# Patient Record
Sex: FEMALE | Race: WHITE | HISPANIC OR LATINO | URBAN - METROPOLITAN AREA
[De-identification: names, ages, dates, MRNs, and addresses within clinical notes are randomized per-mention and may not be internally consistent; named-entity substitution may affect disease eponyms.]

---

## 2023-04-12 ENCOUNTER — NURSING HOME VISIT (OUTPATIENT)
Dept: PRIMARY CARE | Facility: CLINIC | Age: 88
End: 2023-04-12
Payer: MEDICARE

## 2023-04-12 DIAGNOSIS — J18.9 PNEUMONIA DUE TO INFECTIOUS ORGANISM, UNSPECIFIED LATERALITY, UNSPECIFIED PART OF LUNG: ICD-10-CM

## 2023-04-12 DIAGNOSIS — E78.49 OTHER HYPERLIPIDEMIA: ICD-10-CM

## 2023-04-12 DIAGNOSIS — I48.20 CHRONIC ATRIAL FIBRILLATION (MULTI): Primary | ICD-10-CM

## 2023-04-12 DIAGNOSIS — I10 PRIMARY HYPERTENSION: ICD-10-CM

## 2023-04-12 PROBLEM — I50.9 CONGESTIVE HEART FAILURE (MULTI): Status: ACTIVE | Noted: 2023-04-12

## 2023-04-12 PROBLEM — H90.3 BILATERAL SENSORINEURAL HEARING LOSS: Status: ACTIVE | Noted: 2023-04-12

## 2023-04-12 PROBLEM — M15.9 GENERALIZED OSTEOARTHRITIS OF HAND: Status: ACTIVE | Noted: 2023-04-12

## 2023-04-12 PROBLEM — I48.91 ATRIAL FIBRILLATION (MULTI): Status: ACTIVE | Noted: 2023-04-12

## 2023-04-12 PROBLEM — M15.9 GENERALIZED OSTEOARTHRITIS OF MULTIPLE SITES: Status: ACTIVE | Noted: 2023-04-12

## 2023-04-12 PROCEDURE — 99306 1ST NF CARE HIGH MDM 50: CPT | Performed by: INTERNAL MEDICINE

## 2023-04-13 NOTE — PROGRESS NOTES
Subjective- LTC admission to [facility] Transferred from [facility]. Mrs. Patiño has experienced significant cognitive decline and associated increased personal care needs following a stroke in 11/2022. She was unable to continue living independently at [facility] due to pregressive decline in physical capability.  H/O HTN, CKD stage 3b, HFpEF, Mixed dementia with behavioral disturbance, Late onset Alzheimer's diseaseand vascular dementia CAD,afib.  ROS-  Gen- no wt change  Chest-no pain  Respiratory- no cough or shortness of breath+  Abdomen-no constipation No nausea vomiting or diarrhea  Muskuloskeletal-no pain  Psych-no confusion  Vital signs-/69 T 96.9 P 68 Wt 123.6  General-alert, no acute distress  Neck-supple, non tender  Chest-clear breath sounds bilaterally  Cardiovascular-S1S2 regular  Abdomen-bowel sounds present, soft, nontender  Extremities-no edema  Psych-normal mood to affect, no confusion    Assessment and plan-  Cough, low pulse ox- sx started yesterday  ordered stat cxr- pending  c/w albuterol nebulizer q 8 hrs prn  HTN- stable  c/w lisinopril  A fib- c/w metoprolol, apixaban  HLD- c/w lipitor  OA related pains- c/w tylenol prn  Weakness- c/w supportive care and fall precautions

## 2023-04-19 ENCOUNTER — NURSING HOME VISIT (OUTPATIENT)
Dept: POST ACUTE CARE | Facility: EXTERNAL LOCATION | Age: 88
End: 2023-04-19
Payer: MEDICARE

## 2023-04-19 DIAGNOSIS — E78.49 OTHER HYPERLIPIDEMIA: ICD-10-CM

## 2023-04-19 DIAGNOSIS — I48.20 CHRONIC ATRIAL FIBRILLATION (MULTI): Primary | ICD-10-CM

## 2023-04-19 DIAGNOSIS — I50.9 CONGESTIVE HEART FAILURE, UNSPECIFIED HF CHRONICITY, UNSPECIFIED HEART FAILURE TYPE (MULTI): ICD-10-CM

## 2023-04-19 DIAGNOSIS — I10 PRIMARY HYPERTENSION: ICD-10-CM

## 2023-04-19 PROCEDURE — 99308 SBSQ NF CARE LOW MDM 20: CPT | Performed by: INTERNAL MEDICINE

## 2023-04-19 NOTE — LETTER
Patient: Susan Patiño  : 3/29/1926    Encounter Date: 2023    Subjective- resident lying in bed. Appears weak.  No fever.  ROS-  Gen- no wt change  Chest-no pain  Respiratory- no cough or shortness of breath+  Abdomen-no constipation No nausea vomiting or diarrhea  Muskuloskeletal-no pain  Psych-no confusion  Vital signs-/76 T 98.1 P 68 Wt 123.6  General-alert, no acute distress  Neck-supple, non tender  Chest-clear breath sounds bilaterally  Cardiovascular-S1S2 regular  Abdomen-bowel sounds present, soft, nontender  Extremities-no edema  Psych-normal mood to affect, no confusion  Assessment and plan-  Cough, possible left sided pneumonia- s/p azithromycin  c/w albuterol nebulizer q 8 hrs prn  HTN- stable  c/w lisinopril  A fib- c/w metoprolol, apixaban  HLD- c/w lipitor  OA related pains- c/w tylenol prn  Weakness- c/w supportive care and fall precautions      Electronically Signed By: Rajni Acosta MD   23 10:41 AM

## 2023-04-21 NOTE — PROGRESS NOTES
Subjective- resident lying in bed. Appears weak.  No fever.  ROS-  Gen- no wt change  Chest-no pain  Respiratory- no cough or shortness of breath+  Abdomen-no constipation No nausea vomiting or diarrhea  Muskuloskeletal-no pain  Psych-no confusion  Vital signs-/76 T 98.1 P 68 Wt 123.6  General-alert, no acute distress  Neck-supple, non tender  Chest-clear breath sounds bilaterally  Cardiovascular-S1S2 regular  Abdomen-bowel sounds present, soft, nontender  Extremities-no edema  Psych-normal mood to affect, no confusion  Assessment and plan-  Cough, possible left sided pneumonia- s/p azithromycin  c/w albuterol nebulizer q 8 hrs prn  HTN- stable  c/w lisinopril  A fib- c/w metoprolol, apixaban  HLD- c/w lipitor  OA related pains- c/w tylenol prn  Weakness- c/w supportive care and fall precautions

## 2023-04-26 ENCOUNTER — NURSING HOME VISIT (OUTPATIENT)
Dept: POST ACUTE CARE | Facility: EXTERNAL LOCATION | Age: 88
End: 2023-04-26
Payer: MEDICARE

## 2023-04-26 DIAGNOSIS — I10 PRIMARY HYPERTENSION: ICD-10-CM

## 2023-04-26 DIAGNOSIS — I48.20 CHRONIC ATRIAL FIBRILLATION (MULTI): Primary | ICD-10-CM

## 2023-04-26 DIAGNOSIS — E78.49 OTHER HYPERLIPIDEMIA: ICD-10-CM

## 2023-04-26 PROCEDURE — 99308 SBSQ NF CARE LOW MDM 20: CPT | Performed by: INTERNAL MEDICINE

## 2023-04-26 NOTE — LETTER
Patient: Susan Patiño  : 3/29/1926    Encounter Date: 2023    Subjective- resident sitting in bed. Appears well.  No fever. No cough or SOB.  ROS-  Gen- no wt change  Chest-no pain  Respiratory- no cough or shortness of breath  Abdomen-no constipation No nausea vomiting or diarrhea  Muskuloskeletal-no pain  Psych-no confusion  Vital signs-/68 T 98.2 P 66 Wt 121.6  General-alert, no acute distress  Neck-supple, non tender  Chest-clear breath sounds bilaterally  Cardiovascular-S1S2 regular  Abdomen-bowel sounds present, soft, nontender  Extremities-no edema  Psych-normal mood to affect, no confusion    Assessment and plan-  Recentleft sided pneumonia- s/p azithromycin- sx improved  c/w albuterol prn  HTN- stable  c/w lisinopril  A fib- c/w metoprolol, apixaban  HLD- c/w lipitor  OA related pains- c/w tylenol prn  Weakness- c/w supportive care and fall precautions      Electronically Signed By: Rajni Acosta MD   23  8:04 PM

## 2023-04-27 NOTE — PROGRESS NOTES
Subjective- resident sitting in bed. Appears well.  No fever. No cough or SOB.  ROS-  Gen- no wt change  Chest-no pain  Respiratory- no cough or shortness of breath  Abdomen-no constipation No nausea vomiting or diarrhea  Muskuloskeletal-no pain  Psych-no confusion  Vital signs-/68 T 98.2 P 66 Wt 121.6  General-alert, no acute distress  Neck-supple, non tender  Chest-clear breath sounds bilaterally  Cardiovascular-S1S2 regular  Abdomen-bowel sounds present, soft, nontender  Extremities-no edema  Psych-normal mood to affect, no confusion    Assessment and plan-  Recentleft sided pneumonia- s/p azithromycin- sx improved  c/w albuterol prn  HTN- stable  c/w lisinopril  A fib- c/w metoprolol, apixaban  HLD- c/w lipitor  OA related pains- c/w tylenol prn  Weakness- c/w supportive care and fall precautions

## 2023-05-10 ENCOUNTER — NURSING HOME VISIT (OUTPATIENT)
Dept: POST ACUTE CARE | Facility: EXTERNAL LOCATION | Age: 88
End: 2023-05-10
Payer: MEDICARE

## 2023-05-10 DIAGNOSIS — N18.9 CHRONIC KIDNEY DISEASE, UNSPECIFIED CKD STAGE: ICD-10-CM

## 2023-05-10 DIAGNOSIS — I48.20 CHRONIC ATRIAL FIBRILLATION (MULTI): ICD-10-CM

## 2023-05-10 DIAGNOSIS — M15.9 GENERALIZED OSTEOARTHRITIS OF MULTIPLE SITES: ICD-10-CM

## 2023-05-10 DIAGNOSIS — I10 PRIMARY HYPERTENSION: Primary | ICD-10-CM

## 2023-05-10 PROCEDURE — 99307 SBSQ NF CARE SF MDM 10: CPT | Performed by: INTERNAL MEDICINE

## 2023-05-10 NOTE — LETTER
Patient: Susan Patiño  : 3/29/1926    Encounter Date: 05/10/2023    Subjective- resident sitting in dining room. Appears pleasant and well.  No fever. No cough or SOB.  ROS-  Gen- no wt change  Chest-no pain  Respiratory- no cough or shortness of breath  Abdomen-no constipation No nausea vomiting or diarrhea  Muskuloskeletal-no pain  Psych-no confusion  Vital signs-/68 T 97.5 P 66 Wt 122  General-alert, no acute distress  Neck-supple, non tender  Chest-clear breath sounds bilaterally  Cardiovascular-S1S2 regular  Abdomen-bowel sounds present, soft, nontender  Extremities-no edema  Psych-normal mood to affect, no confusion    Assessment and plan-  HTN- stable  c/w lisinopril  A fib- c/w metoprolol, apixaban  HLD- c/w lipitor  OA related pains- c/w tylenol prn  CKD- check labs  CHF- stable      Electronically Signed By: Rajni Acosta MD   23  9:24 AM

## 2023-05-12 ENCOUNTER — NURSING HOME VISIT (OUTPATIENT)
Dept: POST ACUTE CARE | Facility: EXTERNAL LOCATION | Age: 88
End: 2023-05-12
Payer: MEDICARE

## 2023-05-12 DIAGNOSIS — I10 PRIMARY HYPERTENSION: Primary | ICD-10-CM

## 2023-05-12 DIAGNOSIS — N18.9 CHRONIC KIDNEY DISEASE, UNSPECIFIED CKD STAGE: ICD-10-CM

## 2023-05-12 DIAGNOSIS — I48.20 CHRONIC ATRIAL FIBRILLATION (MULTI): ICD-10-CM

## 2023-05-12 PROCEDURE — 99307 SBSQ NF CARE SF MDM 10: CPT | Performed by: INTERNAL MEDICINE

## 2023-05-12 NOTE — PROGRESS NOTES
Subjective- resident sitting in dining room. Appears pleasant and well.  No fever. No cough or SOB.  ROS-  Gen- no wt change  Chest-no pain  Respiratory- no cough or shortness of breath  Abdomen-no constipation No nausea vomiting or diarrhea  Muskuloskeletal-no pain  Psych-no confusion  Vital signs-/68 T 97.5 P 66 Wt 122  General-alert, no acute distress  Neck-supple, non tender  Chest-clear breath sounds bilaterally  Cardiovascular-S1S2 regular  Abdomen-bowel sounds present, soft, nontender  Extremities-no edema  Psych-normal mood to affect, no confusion    Assessment and plan-  HTN- stable  c/w lisinopril  A fib- c/w metoprolol, apixaban  HLD- c/w lipitor  OA related pains- c/w tylenol prn  CKD- check labs  CHF- stable

## 2023-05-12 NOTE — LETTER
Patient: Susan Patiño  : 3/29/1926    Encounter Date: 2023    No notes on file    Electronically Signed By: Rajni Acosta MD   23  9:22 AM

## 2023-05-24 ENCOUNTER — NURSING HOME VISIT (OUTPATIENT)
Dept: POST ACUTE CARE | Facility: EXTERNAL LOCATION | Age: 88
End: 2023-05-24
Payer: MEDICARE

## 2023-05-24 DIAGNOSIS — I10 PRIMARY HYPERTENSION: Primary | ICD-10-CM

## 2023-05-24 DIAGNOSIS — J44.9 COPD MIXED TYPE (MULTI): ICD-10-CM

## 2023-05-24 DIAGNOSIS — I48.20 CHRONIC ATRIAL FIBRILLATION (MULTI): ICD-10-CM

## 2023-05-24 DIAGNOSIS — R53.1 WEAKNESS: ICD-10-CM

## 2023-05-24 PROCEDURE — 99308 SBSQ NF CARE LOW MDM 20: CPT | Performed by: INTERNAL MEDICINE

## 2023-05-24 NOTE — LETTER
Patient: Susan Patiño  : 3/29/1926    Encounter Date: 2023    OSTEOARTHRITIS, UNSPECIFIED SITE, [has been stable / has had the following changes] this last quarter:  Subjective- resident sitting in her room. Appears pleasant and well.  No fever. No cough or SOB.  ROS-  Gen- no wt change  Chest-no pain  Respiratory- no cough or shortness of breath  Abdomen-no constipation No nausea vomiting or diarrhea  Muskuloskeletal-no pain  Psych-no confusion  Vital signs-/76 T 97.7 P 77 Wt 124  General-alert, no acute distress  Neck-supple, non tender  Chest-clear breath sounds bilaterally  Cardiovascular-S1S2 regular  Abdomen-bowel sounds present, soft, nontender  Extremities-no edema  Psych-normal mood to affect, no confusion  Assessment and plan-  HTN- stable  c/w lisinopril  A fib- c/w metoprolol, apixaban  HLD- c/w lipitor  OA related pains- c/w tylenol prn  CKD- monitor  CHF- stable  Weakness- c/w supportive care and fall precautions      Electronically Signed By: Rajni Acosta MD   23  8:44 PM

## 2023-05-25 NOTE — PROGRESS NOTES
OSTEOARTHRITIS, UNSPECIFIED SITE, [has been stable / has had the following changes] this last quarter:  Subjective- resident sitting in her room. Appears pleasant and well.  No fever. No cough or SOB.  ROS-  Gen- no wt change  Chest-no pain  Respiratory- no cough or shortness of breath  Abdomen-no constipation No nausea vomiting or diarrhea  Muskuloskeletal-no pain  Psych-no confusion  Vital signs-/76 T 97.7 P 77 Wt 124  General-alert, no acute distress  Neck-supple, non tender  Chest-clear breath sounds bilaterally  Cardiovascular-S1S2 regular  Abdomen-bowel sounds present, soft, nontender  Extremities-no edema  Psych-normal mood to affect, no confusion  Assessment and plan-  HTN- stable  c/w lisinopril  A fib- c/w metoprolol, apixaban  HLD- c/w lipitor  OA related pains- c/w tylenol prn  CKD- monitor  CHF- stable  Weakness- c/w supportive care and fall precautions

## 2023-06-07 ENCOUNTER — NURSING HOME VISIT (OUTPATIENT)
Dept: POST ACUTE CARE | Facility: EXTERNAL LOCATION | Age: 88
End: 2023-06-07
Payer: MEDICARE

## 2023-06-07 DIAGNOSIS — I48.20 CHRONIC ATRIAL FIBRILLATION (MULTI): ICD-10-CM

## 2023-06-07 DIAGNOSIS — I10 PRIMARY HYPERTENSION: Primary | ICD-10-CM

## 2023-06-07 DIAGNOSIS — E78.49 OTHER HYPERLIPIDEMIA: ICD-10-CM

## 2023-06-07 DIAGNOSIS — M15.9 GENERALIZED OSTEOARTHRITIS OF HAND: ICD-10-CM

## 2023-06-07 PROCEDURE — 99308 SBSQ NF CARE LOW MDM 20: CPT | Performed by: INTERNAL MEDICINE

## 2023-06-07 NOTE — LETTER
Patient: Susan Patiño  : 3/29/1926    Encounter Date: 2023    Subjective- resident sitting in dining room. Appears pleasant and well.  No fever. No cough or SOB.  ROS-  Gen- no wt change  Chest-no pain  Respiratory- no cough or shortness of breath  Abdomen-no constipation No nausea vomiting or diarrhea  Muskuloskeletal-no pain  Psych-no confusion  Vital signs-/69 T 98 P 71 Wt 124  General-alert, no acute distress  Neck-supple, non tender  Chest-clear breath sounds bilaterally  Cardiovascular-S1S2 regular  Abdomen-bowel sounds present, soft, nontender  Extremities-no edema  Psych-normal mood to affect, no confusion  Assessment and plan-  HTN- stable  c/w lisinopril  A fib- c/w metoprolol, apixaban  HLD- c/w lipitor  OA- c/w tylenol prn  CKD- improved  CHF- stable  Weakness- c/w supportive care and fall precautions      Electronically Signed By: Rajin Acosta MD   23 11:12 AM

## 2023-06-08 NOTE — PROGRESS NOTES
Subjective- resident sitting in dining room. Appears pleasant and well.  No fever. No cough or SOB.  ROS-  Gen- no wt change  Chest-no pain  Respiratory- no cough or shortness of breath  Abdomen-no constipation No nausea vomiting or diarrhea  Muskuloskeletal-no pain  Psych-no confusion  Vital signs-/69 T 98 P 71 Wt 124  General-alert, no acute distress  Neck-supple, non tender  Chest-clear breath sounds bilaterally  Cardiovascular-S1S2 regular  Abdomen-bowel sounds present, soft, nontender  Extremities-no edema  Psych-normal mood to affect, no confusion  Assessment and plan-  HTN- stable  c/w lisinopril  A fib- c/w metoprolol, apixaban  HLD- c/w lipitor  OA- c/w tylenol prn  CKD- improved  CHF- stable  Weakness- c/w supportive care and fall precautions

## 2023-06-14 ENCOUNTER — NURSING HOME VISIT (OUTPATIENT)
Dept: POST ACUTE CARE | Facility: EXTERNAL LOCATION | Age: 88
End: 2023-06-14
Payer: MEDICARE

## 2023-06-14 DIAGNOSIS — I48.20 CHRONIC ATRIAL FIBRILLATION (MULTI): Primary | ICD-10-CM

## 2023-06-14 DIAGNOSIS — I10 PRIMARY HYPERTENSION: ICD-10-CM

## 2023-06-14 DIAGNOSIS — J18.9 PNEUMONIA DUE TO INFECTIOUS ORGANISM, UNSPECIFIED LATERALITY, UNSPECIFIED PART OF LUNG: ICD-10-CM

## 2023-06-14 DIAGNOSIS — E78.49 OTHER HYPERLIPIDEMIA: ICD-10-CM

## 2023-06-14 PROCEDURE — 99309 SBSQ NF CARE MODERATE MDM 30: CPT | Performed by: INTERNAL MEDICINE

## 2023-06-14 NOTE — LETTER
Dora Hernandez is a 31 y.o. [id number] at 39w0d presents for IOL secondary to non reactive NST at term along with polyhydramnios (MVP 10.3 cm. JUNAID 28.6 cm).   This IUP is complicated by neurocardiogenic syncope. Patient denies normal labor complaints.    Patient: Susan Patiño  : 3/29/1926    Encounter Date: 2023    Subjective- resident seen for having worsening SOB, pursed breathing. CXR done last night- left sided infiltrate. No fever. Remains on O2.  No fever. No cough or SOB.  ROS-  Gen- no wt change  Chest-no pain  Respiratory- no cough or shortness of breath  Abdomen-no constipation No nausea vomiting or diarrhea  Muskuloskeletal-no pain  Psych-no confusion  Vital signs-/72 T 97.9 P 66 Wt 124  General-alert, no acute distress  Neck-supple, non tender  Chest-clear breath sounds bilaterally  Cardiovascular-S1S2 regular  Abdomen-bowel sounds present, soft, nontender  Extremities-no edema  Psych-normal mood to affect, no confusion  Assessment and plan-  Left lung infiltrate- start levaquin 500 mg daily  c/w O2- monitor pulse ox  HTN- stable  c/w lisinopril  A fib- c/w metoprolol, apixaban  HLD- c/w lipitor  OA- c/w tylenol prn  CKD- monitor  CHF- stable  Weakness- c/w supportive care and fall precautions      Electronically Signed By: Rajni Acosta MD   6/15/23  1:55 PM

## 2023-06-15 NOTE — PROGRESS NOTES
Subjective- resident seen for having worsening SOB, pursed breathing. CXR done last night- left sided infiltrate. No fever. Remains on O2.  No fever. No cough or SOB.  ROS-  Gen- no wt change  Chest-no pain  Respiratory- no cough or shortness of breath  Abdomen-no constipation No nausea vomiting or diarrhea  Muskuloskeletal-no pain  Psych-no confusion  Vital signs-/72 T 97.9 P 66 Wt 124  General-alert, no acute distress  Neck-supple, non tender  Chest-clear breath sounds bilaterally  Cardiovascular-S1S2 regular  Abdomen-bowel sounds present, soft, nontender  Extremities-no edema  Psych-normal mood to affect, no confusion  Assessment and plan-  Left lung infiltrate- start levaquin 500 mg daily  c/w O2- monitor pulse ox  HTN- stable  c/w lisinopril  A fib- c/w metoprolol, apixaban  HLD- c/w lipitor  OA- c/w tylenol prn  CKD- monitor  CHF- stable  Weakness- c/w supportive care and fall precautions

## 2023-06-16 ENCOUNTER — NURSING HOME VISIT (OUTPATIENT)
Dept: POST ACUTE CARE | Facility: EXTERNAL LOCATION | Age: 88
End: 2023-06-16
Payer: MEDICARE

## 2023-06-16 DIAGNOSIS — I10 PRIMARY HYPERTENSION: ICD-10-CM

## 2023-06-16 DIAGNOSIS — J41.0 SIMPLE CHRONIC BRONCHITIS (MULTI): Primary | ICD-10-CM

## 2023-06-16 DIAGNOSIS — I48.20 CHRONIC ATRIAL FIBRILLATION (MULTI): ICD-10-CM

## 2023-06-16 DIAGNOSIS — J18.8 OTHER PNEUMONIA, UNSPECIFIED ORGANISM: ICD-10-CM

## 2023-06-16 PROCEDURE — 99309 SBSQ NF CARE MODERATE MDM 30: CPT | Performed by: INTERNAL MEDICINE

## 2023-06-16 NOTE — LETTER
Patient: Susan Patiño  : 3/29/1926    Encounter Date: 2023    Subjective- resident seen for follow up on pneumonia. Stays on antibiotics. No fever. appears confused.  ROS-  Gen- no wt change  Chest-no pain  Respiratory- cough and shortness of breath+  Abdomen-no constipation No nausea vomiting or diarrhea  Muskuloskeletal-no pain  Psych-no confusion  Vital signs-/68 T 97.9 P 66 Wt 124  General-alert, no acute distress  Neck-supple, non tender  Chest-clear breath sounds bilaterally  Cardiovascular-S1S2 regular  Abdomen-bowel sounds present, soft, nontender  Extremities-no edema  Psych-normal mood to affect, no confusion  Assessment and plan-  Left sided pneumonia- c/w levaquin 500 mg daily  c/w O2- monitor pulse ox  HTN- stable  c/w lisinopril  A fib- c/w metoprolol, apixaban  HLD- c/w lipitor  OA- c/w tylenol prn  CKD- monitor  CHF- stable  Weakness- c/w supportive care and fall precautions  spoke to daughter and updated current rx for pneumonia      Electronically Signed By: Rajni Acosta MD   23  5:34 PM

## 2023-06-18 NOTE — PROGRESS NOTES
Subjective- resident seen for follow up on pneumonia. Stays on antibiotics. No fever. appears confused.  ROS-  Gen- no wt change  Chest-no pain  Respiratory- cough and shortness of breath+  Abdomen-no constipation No nausea vomiting or diarrhea  Muskuloskeletal-no pain  Psych-no confusion  Vital signs-/68 T 97.9 P 66 Wt 124  General-alert, no acute distress  Neck-supple, non tender  Chest-clear breath sounds bilaterally  Cardiovascular-S1S2 regular  Abdomen-bowel sounds present, soft, nontender  Extremities-no edema  Psych-normal mood to affect, no confusion  Assessment and plan-  Left sided pneumonia- c/w levaquin 500 mg daily  c/w O2- monitor pulse ox  HTN- stable  c/w lisinopril  A fib- c/w metoprolol, apixaban  HLD- c/w lipitor  OA- c/w tylenol prn  CKD- monitor  CHF- stable  Weakness- c/w supportive care and fall precautions  spoke to daughter and updated current rx for pneumonia

## 2023-06-21 ENCOUNTER — NURSING HOME VISIT (OUTPATIENT)
Dept: POST ACUTE CARE | Facility: EXTERNAL LOCATION | Age: 88
End: 2023-06-21
Payer: MEDICARE

## 2023-06-21 DIAGNOSIS — I10 PRIMARY HYPERTENSION: ICD-10-CM

## 2023-06-21 DIAGNOSIS — J42 CHRONIC BRONCHITIS, UNSPECIFIED CHRONIC BRONCHITIS TYPE (MULTI): ICD-10-CM

## 2023-06-21 DIAGNOSIS — I48.20 CHRONIC ATRIAL FIBRILLATION (MULTI): Primary | ICD-10-CM

## 2023-06-21 DIAGNOSIS — I50.20 SYSTOLIC CONGESTIVE HEART FAILURE, UNSPECIFIED HF CHRONICITY (MULTI): ICD-10-CM

## 2023-06-21 DIAGNOSIS — E78.49 OTHER HYPERLIPIDEMIA: ICD-10-CM

## 2023-06-21 PROCEDURE — 99308 SBSQ NF CARE LOW MDM 20: CPT | Performed by: INTERNAL MEDICINE

## 2023-06-21 NOTE — LETTER
Patient: Susan Patiño  : 3/29/1926    Encounter Date: 2023    Subjective- Subjective- resident seen for follow up on pneumonia. Completed antibiotics. No fever. breathing appears better. Eating breakfast today.  ROS-  Gen- no wt change  Chest-no pain  Respiratory- no cough or shortness of breath  Abdomen-no constipation Nonausea vomiting or diarrhea  Muskuloskeletal-no pain  Psych-no confusion  Vital signs-/68 T 97.9 P 68 Wt 124  General-alert, no acute distress  Neck-supple, non tender  Chest-clear breath sounds bilaterally  Cardiovascular-S1S2 regular  Abdomen-bowel sounds present, soft, nontender  Extremities-no edema  Psych-normal mood to affect, no confusion  Assessment and plan-  Left sided pneumonia- s/p levaquin  Clinically improving  c/w O2- monitor pulse ox  HTN- stable  c/w lisinopril  A fib- c/w metoprolol, apixaban  HLD- c/w lipitor  OA- c/w tylenol prn  CKD- monitor  CHF- stable  Weakness- c/w supportive care and fall precautions      Electronically Signed By: Rajni Acosta MD   23  8:06 PM

## 2023-06-24 NOTE — PROGRESS NOTES
Subjective- Subjective- resident seen for follow up on pneumonia. Completed antibiotics. No fever. breathing appears better. Eating breakfast today.  ROS-  Gen- no wt change  Chest-no pain  Respiratory- no cough or shortness of breath  Abdomen-no constipation Nonausea vomiting or diarrhea  Muskuloskeletal-no pain  Psych-no confusion  Vital signs-/68 T 97.9 P 68 Wt 124  General-alert, no acute distress  Neck-supple, non tender  Chest-clear breath sounds bilaterally  Cardiovascular-S1S2 regular  Abdomen-bowel sounds present, soft, nontender  Extremities-no edema  Psych-normal mood to affect, no confusion  Assessment and plan-  Left sided pneumonia- s/p levaquin  Clinically improving  c/w O2- monitor pulse ox  HTN- stable  c/w lisinopril  A fib- c/w metoprolol, apixaban  HLD- c/w lipitor  OA- c/w tylenol prn  CKD- monitor  CHF- stable  Weakness- c/w supportive care and fall precautions

## 2023-07-05 ENCOUNTER — NURSING HOME VISIT (OUTPATIENT)
Dept: POST ACUTE CARE | Facility: EXTERNAL LOCATION | Age: 88
End: 2023-07-05
Payer: MEDICARE

## 2023-07-05 DIAGNOSIS — I50.20 SYSTOLIC CONGESTIVE HEART FAILURE, UNSPECIFIED HF CHRONICITY (MULTI): ICD-10-CM

## 2023-07-05 DIAGNOSIS — I10 PRIMARY HYPERTENSION: ICD-10-CM

## 2023-07-05 DIAGNOSIS — I48.0 PAROXYSMAL ATRIAL FIBRILLATION (MULTI): Primary | ICD-10-CM

## 2023-07-05 DIAGNOSIS — J44.9 COPD MIXED TYPE (MULTI): ICD-10-CM

## 2023-07-05 PROCEDURE — 99308 SBSQ NF CARE LOW MDM 20: CPT | Performed by: INTERNAL MEDICINE

## 2023-07-05 NOTE — LETTER
Patient: Susan Patiño  : 3/29/1926    Encounter Date: 2023    Subjective- Subjective- resident seen for follow up. No fever. No concerns per staff. Eating breakfast today.  ROS-  Gen- no wt change  Chest-no pain  Respiratory- no cough or shortness of breath  Abdomen-no constipation Nonausea vomiting or diarrhea  Muskuloskeletal-no pain  Psych-no confusion  Vital signs-/69 T 97.8 P 68 Wt 124  General-alert, no acute distress  Neck-supple, non tender  Chest-clear breath sounds bilaterally  Cardiovascular-S1S2 regular  Abdomen-bowel sounds present, soft, nontender  Extremities-no edema  Psych-normal mood to affect, no confusion  Assessment and plan-  HTN- stable  c/w lisinopril  COPD- c/w Duoneb, oxygen  A fib- c/w metoprolol, apixaban  HLD-c/w lipitor  OA- c/w tylenol prn  CKD- monitor  CHF- stable  Weakness- c/w supportive care and fall precautions      Electronically Signed By: Rajni Acosta MD   23 10:31 AM

## 2023-07-07 NOTE — PROGRESS NOTES
Subjective- Subjective- resident seen for follow up. No fever. No concerns per staff. Eating breakfast today.  ROS-  Gen- no wt change  Chest-no pain  Respiratory- no cough or shortness of breath  Abdomen-no constipation Nonausea vomiting or diarrhea  Muskuloskeletal-no pain  Psych-no confusion  Vital signs-/69 T 97.8 P 68 Wt 124  General-alert, no acute distress  Neck-supple, non tender  Chest-clear breath sounds bilaterally  Cardiovascular-S1S2 regular  Abdomen-bowel sounds present, soft, nontender  Extremities-no edema  Psych-normal mood to affect, no confusion  Assessment and plan-  HTN- stable  c/w lisinopril  COPD- c/w Duoneb, oxygen  A fib- c/w metoprolol, apixaban  HLD-c/w lipitor  OA- c/w tylenol prn  CKD- monitor  CHF- stable  Weakness- c/w supportive care and fall precautions

## 2023-07-19 ENCOUNTER — NURSING HOME VISIT (OUTPATIENT)
Dept: POST ACUTE CARE | Facility: EXTERNAL LOCATION | Age: 88
End: 2023-07-19
Payer: MEDICARE

## 2023-07-19 DIAGNOSIS — N18.32 STAGE 3B CHRONIC KIDNEY DISEASE (MULTI): ICD-10-CM

## 2023-07-19 DIAGNOSIS — J44.9 COPD MIXED TYPE (MULTI): ICD-10-CM

## 2023-07-19 DIAGNOSIS — I50.22 CHRONIC SYSTOLIC CONGESTIVE HEART FAILURE (MULTI): ICD-10-CM

## 2023-07-19 DIAGNOSIS — I10 PRIMARY HYPERTENSION: ICD-10-CM

## 2023-07-19 DIAGNOSIS — I48.20 CHRONIC ATRIAL FIBRILLATION (MULTI): Primary | ICD-10-CM

## 2023-07-19 PROCEDURE — 99308 SBSQ NF CARE LOW MDM 20: CPT | Performed by: INTERNAL MEDICINE

## 2023-07-19 NOTE — PROGRESS NOTES
Subjective- resident seen for follow up. She is sitting in her room. No fever. Daughter concerned about current furosemide ordered and urinating frequently.  ROS-  Gen- no wt change  Chest-no pain  Respiratory- no cough or shortness of breath  Abdomen-no constipation No nausea/vomiting or diarrhea  Muskuloskeletal-no pain  Psych-no confusion  Vital signs-/69 T 98 P 68 Wt 124  General-alert, no acute distress  Neck-supple, non tender  Chest-clear breath sounds bilaterally  Cardiovascular-S1S2 regular  Abdomen-bowel sounds present, soft, nontender  Extremities-no edema  Psych-normal mood to affect, no confusion  Assessment and plan-  HTN- stable  c/w lisinopril  D/C furosemide  check BMP,BNP  COPD- c/w Duoneb, oxygen  A fib- c/w metoprolol, apixaban  HLD-c/w lipitor  OA- c/w tylenol prn  CKD- monitor  CHF- stable  Weakness- c/w supportive care and fall precautions

## 2023-07-19 NOTE — LETTER
Patient: Susan Patiño  : 3/29/1926    Encounter Date: 2023    Subjective- resident seen for follow up. She is sitting in her room. No fever. Daughter concerned about current furosemide ordered and urinating frequently.  ROS-  Gen- no wt change  Chest-no pain  Respiratory- no cough or shortness of breath  Abdomen-no constipation No nausea/vomiting or diarrhea  Muskuloskeletal-no pain  Psych-no confusion  Vital signs-/69 T 98 P 68 Wt 124  General-alert, no acute distress  Neck-supple, non tender  Chest-clear breath sounds bilaterally  Cardiovascular-S1S2 regular  Abdomen-bowel sounds present, soft, nontender  Extremities-no edema  Psych-normal mood to affect, no confusion  Assessment and plan-  HTN- stable  c/w lisinopril  D/C furosemide  check BMP,BNP  COPD- c/w Duoneb, oxygen  A fib- c/w metoprolol, apixaban  HLD-c/w lipitor  OA- c/w tylenol prn  CKD- monitor  CHF- stable  Weakness- c/w supportive care and fall precautions      Electronically Signed By: Rajni Acosta MD   23  1:48 PM

## 2023-08-02 ENCOUNTER — NURSING HOME VISIT (OUTPATIENT)
Dept: POST ACUTE CARE | Facility: EXTERNAL LOCATION | Age: 88
End: 2023-08-02
Payer: MEDICARE

## 2023-08-02 DIAGNOSIS — N18.9 CHRONIC KIDNEY DISEASE, UNSPECIFIED CKD STAGE: ICD-10-CM

## 2023-08-02 DIAGNOSIS — J44.9 COPD MIXED TYPE (MULTI): ICD-10-CM

## 2023-08-02 DIAGNOSIS — I48.20 CHRONIC ATRIAL FIBRILLATION (MULTI): Primary | ICD-10-CM

## 2023-08-02 DIAGNOSIS — I50.20 SYSTOLIC CONGESTIVE HEART FAILURE, UNSPECIFIED HF CHRONICITY (MULTI): ICD-10-CM

## 2023-08-02 DIAGNOSIS — I10 PRIMARY HYPERTENSION: ICD-10-CM

## 2023-08-02 PROCEDURE — 99308 SBSQ NF CARE LOW MDM 20: CPT | Performed by: INTERNAL MEDICINE

## 2023-08-02 NOTE — PROGRESS NOTES
Subjective- resident seen for follow up. She is sitting in her room. No fever. Daughter concerned about current furosemide ordered and urinating frequently.  ROS-  Gen- no wt change  Chest-no pain  Respiratory- no cough or shortness of breath  Abdomen-no constipation No nausea/vomiting or diarrhea  Muskuloskeletal-no pain  Psych-no confusion  Vital signs-/69 T 97.7 P 68 Wt 124  General-alert, no acute distress  Neck-supple, non tender  Chest-clear breath sounds bilaterally  Cardiovascular-S1S2 regular  Abdomen-bowel sounds present, soft, nontender  Extremities-no edema  Psych-normal mood to affect, no confusion  Assessment and plan-  HTN- stable  c/w lisinopril  COPD- c/w Duoneb, oxygen  A fib- c/w metoprolol, apixaban  HLD-c/w lipitor  OA- c/w tylenol prn  CKD- monitor  CHF- stable  Weakness- c/w supportive care and fall precautions

## 2023-08-02 NOTE — LETTER
Patient: Susan Patiño  : 3/29/1926    Encounter Date: 2023    Subjective- resident seen for follow up. She is sitting in her room. No fever. Daughter concerned about current furosemide ordered and urinating frequently.  ROS-  Gen- no wt change  Chest-no pain  Respiratory- no cough or shortness of breath  Abdomen-no constipation No nausea/vomiting or diarrhea  Muskuloskeletal-no pain  Psych-no confusion  Vital signs-/69 T 97.7 P 68 Wt 124  General-alert, no acute distress  Neck-supple, non tender  Chest-clear breath sounds bilaterally  Cardiovascular-S1S2 regular  Abdomen-bowel sounds present, soft, nontender  Extremities-no edema  Psych-normal mood to affect, no confusion  Assessment and plan-  HTN- stable  c/w lisinopril  COPD- c/w Duoneb, oxygen  A fib- c/w metoprolol, apixaban  HLD-c/w lipitor  OA- c/w tylenol prn  CKD- monitor  CHF- stable  Weakness- c/w supportive care and fall precautions      Electronically Signed By: Rajni Acosta MD   23  7:58 PM

## 2023-08-09 ENCOUNTER — NURSING HOME VISIT (OUTPATIENT)
Dept: POST ACUTE CARE | Facility: EXTERNAL LOCATION | Age: 88
End: 2023-08-09
Payer: MEDICARE

## 2023-08-09 DIAGNOSIS — I10 PRIMARY HYPERTENSION: Primary | ICD-10-CM

## 2023-08-09 DIAGNOSIS — R06.00 DYSPNEA, UNSPECIFIED TYPE: ICD-10-CM

## 2023-08-09 DIAGNOSIS — I48.20 CHRONIC ATRIAL FIBRILLATION (MULTI): ICD-10-CM

## 2023-08-09 DIAGNOSIS — J44.9 COPD MIXED TYPE (MULTI): ICD-10-CM

## 2023-08-09 PROCEDURE — 99309 SBSQ NF CARE MODERATE MDM 30: CPT | Performed by: INTERNAL MEDICINE

## 2023-08-09 NOTE — LETTER
Patient: Susan Patiño  : 3/29/1926    Encounter Date: 2023    Subjective- resident seen for follow up. She is sitting in her room. No fever. appears short of breath as noted by family.  ROS-  Gen- no wt change  Chest-no pain  Respiratory-chronic shortness of breath+  Abdomen-no constipation No nausea/vomiting or diarrhea  Muskuloskeletal-no pain  Psych-no confusion  Vital signs-/69 T 97.7 P 68 Wt 124  General-alert, no acute distress  Neck-supple, non tender  Chest-clear breath sounds bilaterally  Cardiovascular-S1S2 regular  Abdomen-bowel sounds present, soft, nontender  Extremities-no edema  Psych-normal mood to affect, no confusion  Assessment and plan-  HTN- stable  c/w lisinopril  COPD- order CXR, and bnp  c/w Duoneb, oxygen  monitor  A fib- c/w metoprolol, apixaban  HLD-c/w lipitor  OA- c/w tylenol prn  CKD- monitor  CHF- stable  Weakness- c/w supportive care and fall precautions.      Electronically Signed By: Rajni Acosta MD   23  2:30 PM

## 2023-08-11 ENCOUNTER — NURSING HOME VISIT (OUTPATIENT)
Dept: POST ACUTE CARE | Facility: EXTERNAL LOCATION | Age: 88
End: 2023-08-11
Payer: MEDICARE

## 2023-08-11 DIAGNOSIS — I50.22 CHRONIC SYSTOLIC CONGESTIVE HEART FAILURE (MULTI): ICD-10-CM

## 2023-08-11 DIAGNOSIS — I48.20 CHRONIC ATRIAL FIBRILLATION (MULTI): Primary | ICD-10-CM

## 2023-08-11 DIAGNOSIS — E78.49 OTHER HYPERLIPIDEMIA: ICD-10-CM

## 2023-08-11 PROCEDURE — 99309 SBSQ NF CARE MODERATE MDM 30: CPT | Performed by: INTERNAL MEDICINE

## 2023-08-11 NOTE — LETTER
Patient: Susan Patiño  : 3/29/1926    Encounter Date: 2023    Subjective- resident seen for follow up on SOB.  CXR- congestion, infiltrate. Re started on lasix and abx prescribed. Appears better , SOB not worsened.  Ros- gen- no fever  Chest-no pain  Respiratory-chronic shortness of breath+  Abdomen-no constipation No nausea/vomiting or diarrhea  Muskuloskeletal-no pain  Psych-no confusion  Vital signs-/69 T 97.7 P 68 Wt 124  General-alert, no acute distress  Neck-supple, non tender  Chest-clear breath sounds bilaterally  Cardiovascular-S1S2 regular  Abdomen-bowel sounds present, soft, nontender  Extremities-no edema  Psych-normal mood to affect, no confusion  Assessment and plan-  SOB, chronic COPD- pulmonary congestion, elevated bnp, pneumonia  c/w abx as gien  c/w lasix  c/w oxygen, duoneb  HTN- stable  c/w lisinopril  A fib- c/w metoprolol, apixaban  HLD-c/w lipitor  OA- c/w tylenol prn  CKD- monitor  CHF- stable- plan as above  Weakness- c/w supportive care and fall precautions.      Electronically Signed By: Rajni Acosta MD   23  2:35 PM

## 2023-08-13 NOTE — PROGRESS NOTES
Subjective- resident seen for follow up on SOB.  CXR- congestion, infiltrate. Re started on lasix and abx prescribed. Appears better , SOB not worsened.  Ros- gen- no fever  Chest-no pain  Respiratory-chronic shortness of breath+  Abdomen-no constipation No nausea/vomiting or diarrhea  Muskuloskeletal-no pain  Psych-no confusion  Vital signs-/69 T 97.7 P 68 Wt 124  General-alert, no acute distress  Neck-supple, non tender  Chest-clear breath sounds bilaterally  Cardiovascular-S1S2 regular  Abdomen-bowel sounds present, soft, nontender  Extremities-no edema  Psych-normal mood to affect, no confusion  Assessment and plan-  SOB, chronic COPD- pulmonary congestion, elevated bnp, pneumonia  c/w abx as gien  c/w lasix  c/w oxygen, duoneb  HTN- stable  c/w lisinopril  A fib- c/w metoprolol, apixaban  HLD-c/w lipitor  OA- c/w tylenol prn  CKD- monitor  CHF- stable- plan as above  Weakness- c/w supportive care and fall precautions.

## 2023-08-16 ENCOUNTER — NURSING HOME VISIT (OUTPATIENT)
Dept: POST ACUTE CARE | Facility: EXTERNAL LOCATION | Age: 88
End: 2023-08-16
Payer: MEDICARE

## 2023-08-16 DIAGNOSIS — E78.49 OTHER HYPERLIPIDEMIA: ICD-10-CM

## 2023-08-16 DIAGNOSIS — J44.9 COPD MIXED TYPE (MULTI): ICD-10-CM

## 2023-08-16 DIAGNOSIS — I48.20 CHRONIC ATRIAL FIBRILLATION (MULTI): Primary | ICD-10-CM

## 2023-08-16 DIAGNOSIS — I10 PRIMARY HYPERTENSION: ICD-10-CM

## 2023-08-16 PROCEDURE — 99308 SBSQ NF CARE LOW MDM 20: CPT | Performed by: INTERNAL MEDICINE

## 2023-08-16 NOTE — LETTER
Patient: Susan Patiño  : 3/29/1926    Encounter Date: 2023    Subjective- resident seen for follow up CHF, pneumonia. given lasix and antibiotics. Appears better. No concerns today.  Ros- gen- no fever  Chest-no pain  Respiratory-chronic shortness of breath+  Abdomen-no constipation No nausea/vomiting or diarrhea  Muskuloskeletal-no pain  Psych-no confusion  Vital signs-/69 T 97.7 P 68 Wt 124  General-alert, no acute distress  Neck-supple, non tender  Chest-clear breath sounds bilaterally  Cardiovascular-S1S2 regular  Abdomen-bowel sounds present, soft, nontender  Extremities-no edema  Psych-normal mood to affect, no confusion    Assessment and plan-  SOB, chronic COPD- pulmonary congestion, elevated bnp, pneumonia  s/p abx  c/w lasix  c/w oxygen, duoneb  HTN- stable  c/w lisinopril  A fib- c/w metoprolol, apixaban  HLD-c/w lipitor  OA- c/w tylenol prn  CKD- monitor  CHF- stable- plan as above  Weakness- c/w supportive care and fall precautions.      Electronically Signed By: Rajni Acosta MD   23  9:55 AM

## 2023-08-17 NOTE — PROGRESS NOTES
Subjective- resident seen for follow up CHF, pneumonia. given lasix and antibiotics. Appears better. No concerns today.  Ros- gen- no fever  Chest-no pain  Respiratory-chronic shortness of breath+  Abdomen-no constipation No nausea/vomiting or diarrhea  Muskuloskeletal-no pain  Psych-no confusion  Vital signs-/69 T 97.7 P 68 Wt 124  General-alert, no acute distress  Neck-supple, non tender  Chest-clear breath sounds bilaterally  Cardiovascular-S1S2 regular  Abdomen-bowel sounds present, soft, nontender  Extremities-no edema  Psych-normal mood to affect, no confusion    Assessment and plan-  SOB, chronic COPD- pulmonary congestion, elevated bnp, pneumonia  s/p abx  c/w lasix  c/w oxygen, duoneb  HTN- stable  c/w lisinopril  A fib- c/w metoprolol, apixaban  HLD-c/w lipitor  OA- c/w tylenol prn  CKD- monitor  CHF- stable- plan as above  Weakness- c/w supportive care and fall precautions.

## 2023-09-06 ENCOUNTER — NURSING HOME VISIT (OUTPATIENT)
Dept: POST ACUTE CARE | Facility: EXTERNAL LOCATION | Age: 88
End: 2023-09-06
Payer: MEDICARE

## 2023-09-06 DIAGNOSIS — I48.20 CHRONIC ATRIAL FIBRILLATION (MULTI): ICD-10-CM

## 2023-09-06 DIAGNOSIS — I10 PRIMARY HYPERTENSION: Primary | ICD-10-CM

## 2023-09-06 DIAGNOSIS — B37.9 CANDIDIASIS: ICD-10-CM

## 2023-09-06 DIAGNOSIS — E78.49 OTHER HYPERLIPIDEMIA: ICD-10-CM

## 2023-09-06 DIAGNOSIS — N18.30 STAGE 3 CHRONIC KIDNEY DISEASE, UNSPECIFIED WHETHER STAGE 3A OR 3B CKD (MULTI): ICD-10-CM

## 2023-09-06 PROCEDURE — 99309 SBSQ NF CARE MODERATE MDM 30: CPT | Performed by: INTERNAL MEDICINE

## 2023-09-06 NOTE — LETTER
Patient: Susan Patiño  : 3/29/1926    Encounter Date: 2023    Subjective- resident seen for follow up rash under breast as noted by staff. She appears well and sitting in her room.  Ros- gen- no fever  Chest-no pain  Respiratory-chronic shortnessof breath+  Abdomen-no constipation No nausea/vomiting or diarrhea  Muskuloskeletal-no pain  Psych-no confusion  Vital signs-/69 T 97.8 P 68 Wt 123.6  General-alert, no acute distress  Neck-supple, non tender  Chest-clear breath sounds bilaterally  Cardiovascular-S1S2 regular  Abdomen-bowel sounds present, soft, nontender  Extremities-no edema  Psych-normal mood to affect, no confusion  skin- oval area of erythematous macular rash under R breast    Assessment and plan-  Rash- candida under R breast- start nystatin BID  Chronic COPD- improved  c/w O2  CHF- c/w lasix  HTN- stable  c/w lisinopril  A fib- c/w metoprolol, apixaban  HLD-c/w lipitor  OA- c/w tylenol prn  CKD- monitor  CHF- stable- plan as above  Weakness- c/w supportive care and fall precautions.      Electronically Signed By: Rajni Acosta MD   23 11:09 AM

## 2023-09-08 NOTE — PROGRESS NOTES
Subjective- resident seen for follow up rash under breast as noted by staff. She appears well and sitting in her room.  Ros- gen- no fever  Chest-no pain  Respiratory-chronic shortnessof breath+  Abdomen-no constipation No nausea/vomiting or diarrhea  Muskuloskeletal-no pain  Psych-no confusion  Vital signs-/69 T 97.8 P 68 Wt 123.6  General-alert, no acute distress  Neck-supple, non tender  Chest-clear breath sounds bilaterally  Cardiovascular-S1S2 regular  Abdomen-bowel sounds present, soft, nontender  Extremities-no edema  Psych-normal mood to affect, no confusion  skin- oval area of erythematous macular rash under R breast    Assessment and plan-  Rash- candida under R breast- start nystatin BID  Chronic COPD- improved  c/w O2  CHF- c/w lasix  HTN- stable  c/w lisinopril  A fib- c/w metoprolol, apixaban  HLD-c/w lipitor  OA- c/w tylenol prn  CKD- monitor  CHF- stable- plan as above  Weakness- c/w supportive care and fall precautions.

## 2023-09-20 ENCOUNTER — NURSING HOME VISIT (OUTPATIENT)
Dept: POST ACUTE CARE | Facility: EXTERNAL LOCATION | Age: 88
End: 2023-09-20
Payer: MEDICARE

## 2023-09-20 DIAGNOSIS — J42 CHRONIC BRONCHITIS, UNSPECIFIED CHRONIC BRONCHITIS TYPE (MULTI): ICD-10-CM

## 2023-09-20 DIAGNOSIS — R53.1 WEAKNESS: ICD-10-CM

## 2023-09-20 DIAGNOSIS — I48.20 CHRONIC ATRIAL FIBRILLATION (MULTI): Primary | ICD-10-CM

## 2023-09-20 PROCEDURE — 99308 SBSQ NF CARE LOW MDM 20: CPT | Performed by: INTERNAL MEDICINE

## 2023-09-20 NOTE — PROGRESS NOTES
Subjective- resident seen for follow up. She appears well and sitting in her room.  Ros- gen- no fever  Chest-no pain  Respiratory-chronic shortnessof breath+  Abdomen-no constipation No nausea/vomiting or diarrhea  Muskuloskeletal-no pain  Psych-no confusion  Vital signs-/64 T 97.7 P 65 Wt 121.4  General-alert, no acute distress  Neck-supple, non tender  Chest-clear breath sounds bilaterally  Cardiovascular-S1S2 regular  Abdomen-bowel sounds present, soft, nontender  Extremities-no edema  Psych-normal mood to affect, no confusion  skin- oval area of erythematous macular rash under R breast  Assessment and plan-  Chronic COPD- improved  c/w O2  CHF- c/w lasix  HTN- stable  c/w lisinopril  A fib- c/w metoprolol, apixaban  HLD-c/w lipitor  OA- c/w tylenol prn  CKD- monitor  CHF- stable- plan as above  Weakness- c/w supportive care and fall precautions.

## 2023-09-20 NOTE — LETTER
Patient: Susan Patiño  : 3/29/1926    Encounter Date: 2023    Subjective- resident seen for follow up. She appears well and sitting in her room.  Ros- gen- no fever  Chest-no pain  Respiratory-chronic shortnessof breath+  Abdomen-no constipation No nausea/vomiting or diarrhea  Muskuloskeletal-no pain  Psych-no confusion  Vital signs-/64 T 97.7 P 65 Wt 121.4  General-alert, no acute distress  Neck-supple, non tender  Chest-clear breath sounds bilaterally  Cardiovascular-S1S2 regular  Abdomen-bowel sounds present, soft, nontender  Extremities-no edema  Psych-normal mood to affect, no confusion  skin- oval area of erythematous macular rash under R breast  Assessment and plan-  Chronic COPD- improved  c/w O2  CHF- c/w lasix  HTN- stable  c/w lisinopril  A fib- c/w metoprolol, apixaban  HLD-c/w lipitor  OA- c/w tylenol prn  CKD- monitor  CHF- stable- plan as above  Weakness- c/w supportive care and fall precautions.      Electronically Signed By: Rajni Acosta MD   23 11:35 AM

## 2023-09-27 ENCOUNTER — NURSING HOME VISIT (OUTPATIENT)
Dept: POST ACUTE CARE | Facility: EXTERNAL LOCATION | Age: 88
End: 2023-09-27
Payer: MEDICARE

## 2023-09-27 DIAGNOSIS — I10 PRIMARY HYPERTENSION: ICD-10-CM

## 2023-09-27 DIAGNOSIS — I48.20 CHRONIC ATRIAL FIBRILLATION (MULTI): Primary | ICD-10-CM

## 2023-09-27 DIAGNOSIS — J42 CHRONIC BRONCHITIS, UNSPECIFIED CHRONIC BRONCHITIS TYPE (MULTI): ICD-10-CM

## 2023-09-27 DIAGNOSIS — R53.1 WEAKNESS: ICD-10-CM

## 2023-09-27 PROCEDURE — 99308 SBSQ NF CARE LOW MDM 20: CPT | Performed by: INTERNAL MEDICINE

## 2023-09-27 NOTE — LETTER
Patient: Susan Patiño  : 3/29/1926    Encounter Date: 2023    Subjective- resident seen for follow up. She appears well and sitting in her room.  Chest-no pain  Respiratory-SOB+  Abdomen-no constipation No nausea/vomiting or diarrhea  Muskuloskeletal-no pain  Psych-no confusion  Vital signs-/64 T 97.8 P 65 Wt 121.4  General-alert, no acute distress  Neck-supple, non tender  Chest-clear breath sounds bilaterally  Cardiovascular-S1S2 regular  Abdomen-bowel sounds present, soft, nontender  Extremities-no edema  Psych-normal mood to affect, no confusion  skin- oval area of erythematous macular rash under R breast  Assessment and plan-  Chronic COPD- improved  c/w O2  CHF- c/w lasix  HTN- stable  c/w lisinopril  A fib- c/w metoprolol, apixaban  HLD-c/w lipitor  OA- c/w tylenol prn  CKD- monitor  CHF- stable- plan as above  Weakness- c/w supportive care and fall precautions.      Electronically Signed By: Rajni Acosta MD   23  7:30 PM

## 2023-09-27 NOTE — PROGRESS NOTES
Subjective- resident seen for follow up. She appears well and sitting in her room.  Chest-no pain  Respiratory-SOB+  Abdomen-no constipation No nausea/vomiting or diarrhea  Muskuloskeletal-no pain  Psych-no confusion  Vital signs-/64 T 97.8 P 65 Wt 121.4  General-alert, no acute distress  Neck-supple, non tender  Chest-clear breath sounds bilaterally  Cardiovascular-S1S2 regular  Abdomen-bowel sounds present, soft, nontender  Extremities-no edema  Psych-normal mood to affect, no confusion  skin- oval area of erythematous macular rash under R breast  Assessment and plan-  Chronic COPD- improved  c/w O2  CHF- c/w lasix  HTN- stable  c/w lisinopril  A fib- c/w metoprolol, apixaban  HLD-c/w lipitor  OA- c/w tylenol prn  CKD- monitor  CHF- stable- plan as above  Weakness- c/w supportive care and fall precautions.

## 2023-10-25 ENCOUNTER — NURSING HOME VISIT (OUTPATIENT)
Dept: POST ACUTE CARE | Facility: EXTERNAL LOCATION | Age: 88
End: 2023-10-25
Payer: MEDICARE

## 2023-10-25 DIAGNOSIS — I48.20 CHRONIC ATRIAL FIBRILLATION (MULTI): ICD-10-CM

## 2023-10-25 DIAGNOSIS — I10 PRIMARY HYPERTENSION: Primary | ICD-10-CM

## 2023-10-25 DIAGNOSIS — J42 CHRONIC BRONCHITIS, UNSPECIFIED CHRONIC BRONCHITIS TYPE (MULTI): ICD-10-CM

## 2023-10-25 DIAGNOSIS — I50.22 CHRONIC SYSTOLIC CONGESTIVE HEART FAILURE (MULTI): ICD-10-CM

## 2023-10-25 DIAGNOSIS — R53.1 WEAKNESS: ICD-10-CM

## 2023-10-25 PROCEDURE — 99308 SBSQ NF CARE LOW MDM 20: CPT | Performed by: INTERNAL MEDICINE

## 2023-10-25 NOTE — LETTER
Patient: Susan Patiño  : 3/29/1926    Encounter Date: 10/25/2023    Subjective- resident seen for follow up. She appears well and sitting in her room. Appetite is fair. No falls.  Ros-  Chest-no pain  Respiratory-SOB+  Abdomen-no constipation No nausea/vomiting or diarrhea  Muskuloskeletal-no pain  Psych-no confusion  Vital signs-/64 T 97.8 P 65 Wt 123.1  General-alert, no acute distress  Neck-supple, non tender  Chest-clear breath sounds bilaterally  Cardiovascular-S1S2 regular  Abdomen-bowel sounds present, soft, nontender  Extremities-no edema  Psych-normal mood to affect, no confusion  skin- oval area of erythematous macular rash under R breast    Assessment and plan-  Chronic COPD- stable  c/w O2  CHF- c/w lasix  HTN- stable  c/w lisinopril  A fib- c/w metoprolol, apixaban  HLD-c/w lipitor  OA- c/w tylenol prn  CKD- monitor  CHF- stable- plan as above  Weakness- c/w supportive care and fall precautions.      Electronically Signed By: Rajni Acosta MD   10/27/23 10:46 AM

## 2023-10-27 NOTE — PROGRESS NOTES
Subjective- resident seen for follow up. She appears well and sitting in her room. Appetite is fair. No falls.  Ros-  Chest-no pain  Respiratory-SOB+  Abdomen-no constipation No nausea/vomiting or diarrhea  Muskuloskeletal-no pain  Psych-no confusion  Vital signs-/64 T 97.8 P 65 Wt 123.1  General-alert, no acute distress  Neck-supple, non tender  Chest-clear breath sounds bilaterally  Cardiovascular-S1S2 regular  Abdomen-bowel sounds present, soft, nontender  Extremities-no edema  Psych-normal mood to affect, no confusion  skin- oval area of erythematous macular rash under R breast    Assessment and plan-  Chronic COPD- stable  c/w O2  CHF- c/w lasix  HTN- stable  c/w lisinopril  A fib- c/w metoprolol, apixaban  HLD-c/w lipitor  OA- c/w tylenol prn  CKD- monitor  CHF- stable- plan as above  Weakness- c/w supportive care and fall precautions.

## 2023-11-15 ENCOUNTER — NURSING HOME VISIT (OUTPATIENT)
Dept: POST ACUTE CARE | Facility: EXTERNAL LOCATION | Age: 88
End: 2023-11-15
Payer: MEDICARE

## 2023-11-15 DIAGNOSIS — R53.1 WEAKNESS: ICD-10-CM

## 2023-11-15 DIAGNOSIS — I10 PRIMARY HYPERTENSION: ICD-10-CM

## 2023-11-15 DIAGNOSIS — N18.9 CHRONIC KIDNEY DISEASE, UNSPECIFIED CKD STAGE: ICD-10-CM

## 2023-11-15 DIAGNOSIS — J42 CHRONIC BRONCHITIS, UNSPECIFIED CHRONIC BRONCHITIS TYPE (MULTI): Primary | ICD-10-CM

## 2023-11-15 DIAGNOSIS — I50.20 SYSTOLIC CONGESTIVE HEART FAILURE, UNSPECIFIED HF CHRONICITY (MULTI): ICD-10-CM

## 2023-11-15 PROCEDURE — 99308 SBSQ NF CARE LOW MDM 20: CPT | Performed by: INTERNAL MEDICINE

## 2023-11-15 NOTE — PROGRESS NOTES
Subjective- resident seen for follow up. She appears well and sitting in her room. Appetite is fair. No falls.  Ros-  Chest-no pain  Respiratory-SOB+  Abdomen-no constipation No nausea/vomiting or diarrhea  Muskuloskeletal-no pain  Psych-no confusion  Vital signs-/64 T 97.8 P 65 Wt 123.8  General-alert, no acute distress  Neck-supple, non tender  Chest-clear breath sounds bilaterally  Cardiovascular-S1S2 regular  Abdomen-bowel sounds present, soft, nontender  Extremities-no edema  Psych-normal mood to affect, no confusion  Assessment and plan-  Chronic COPD- stable  c/w O2  CHF- c/w lasix  HTN- stable  c/w lisinopril  A fib- c/w metoprolol, apixaban  HLD-c/w lipitor  OA- c/w tylenol prn  CKD- monitor  CHF- stable- plan as above  Weakness- c/w supportive care and fall precautions.

## 2023-11-15 NOTE — LETTER
Patient: Susan Patiño  : 3/29/1926    Encounter Date: 11/15/2023    Subjective- resident seen for follow up. She appears well and sitting in her room. Appetite is fair. No falls.  Ros-  Chest-no pain  Respiratory-SOB+  Abdomen-no constipation No nausea/vomiting or diarrhea  Muskuloskeletal-no pain  Psych-no confusion  Vital signs-/64 T 97.8 P 65 Wt 123.8  General-alert, no acute distress  Neck-supple, non tender  Chest-clear breath sounds bilaterally  Cardiovascular-S1S2 regular  Abdomen-bowel sounds present, soft, nontender  Extremities-no edema  Psych-normal mood to affect, no confusion  Assessment and plan-  Chronic COPD- stable  c/w O2  CHF- c/w lasix  HTN- stable  c/w lisinopril  A fib- c/w metoprolol, apixaban  HLD-c/w lipitor  OA- c/w tylenol prn  CKD- monitor  CHF- stable- plan as above  Weakness- c/w supportive care and fall precautions.      Electronically Signed By: Rajni Acosta MD   11/15/23 11:47 AM

## 2023-12-27 ENCOUNTER — NURSING HOME VISIT (OUTPATIENT)
Dept: POST ACUTE CARE | Facility: EXTERNAL LOCATION | Age: 88
End: 2023-12-27
Payer: MEDICARE

## 2023-12-27 ENCOUNTER — NURSING HOME VISIT (OUTPATIENT)
Dept: POST ACUTE CARE | Facility: EXTERNAL LOCATION | Age: 88
End: 2023-12-27

## 2023-12-27 DIAGNOSIS — I10 PRIMARY HYPERTENSION: Primary | ICD-10-CM

## 2023-12-27 DIAGNOSIS — N18.9 CHRONIC KIDNEY DISEASE, UNSPECIFIED CKD STAGE: ICD-10-CM

## 2023-12-27 DIAGNOSIS — J42 CHRONIC BRONCHITIS, UNSPECIFIED CHRONIC BRONCHITIS TYPE (MULTI): ICD-10-CM

## 2023-12-27 DIAGNOSIS — I48.20 CHRONIC ATRIAL FIBRILLATION (MULTI): ICD-10-CM

## 2023-12-27 DIAGNOSIS — R53.1 WEAKNESS: ICD-10-CM

## 2023-12-27 DIAGNOSIS — I10 PRIMARY HYPERTENSION: ICD-10-CM

## 2023-12-27 DIAGNOSIS — J42 CHRONIC BRONCHITIS, UNSPECIFIED CHRONIC BRONCHITIS TYPE (MULTI): Primary | ICD-10-CM

## 2023-12-27 PROCEDURE — 99308 SBSQ NF CARE LOW MDM 20: CPT | Performed by: INTERNAL MEDICINE

## 2023-12-27 NOTE — LETTER
Patient: Susan Patiño  : 3/29/1926    Encounter Date: 2023    Subjective- resident seen for follow up. She appears well and sitting in her room. Appetite is fair. No falls.  Ros-  Chest-no pain  Respiratory-SOB+  Abdomen-no constipation No nausea/vomiting or diarrhea  Muskuloskeletal-no pain  Psych-no confusion  Vital signs-/64 T 97.7 P 58 Wt 121.9  General-alert, no acute distress  Neck-supple, non tender  Chest-clear breath sounds bilaterally  Cardiovascular-S1S2 regular  Abdomen-bowel sounds present, soft, nontender  Extremities-no edema  Psych-normal mood to affect  Assessment and plan-  Chronic COPD- stable  c/w O2  CHF- c/w lasix  HTN- stable  c/w lisinopril  A fib- c/w metoprolol, apixaban  HLD-c/w lipitor  OA- c/w tylenol prn  CKD-check labs  CHF- stable- plan as above  Weakness- c/w supportive care and fall precautions.      Electronically Signed By: Rajni Acosta MD   23  7:07 AM

## 2023-12-27 NOTE — LETTER
Patient: Susan Patiño  : 3/29/1926    Encounter Date: 2023    No notes on file    Electronically Signed By: Rajni Acosta MD   23  7:08 AM

## 2023-12-28 NOTE — PROGRESS NOTES
Subjective- resident seen for follow up. She appears well and sitting in her room. Appetite is fair. No falls.  Ros-  Chest-no pain  Respiratory-SOB+  Abdomen-no constipation No nausea/vomiting or diarrhea  Muskuloskeletal-no pain  Psych-no confusion  Vital signs-/64 T 97.7 P 58 Wt 121.9  General-alert, no acute distress  Neck-supple, non tender  Chest-clear breath sounds bilaterally  Cardiovascular-S1S2 regular  Abdomen-bowel sounds present, soft, nontender  Extremities-no edema  Psych-normal mood to affect  Assessment and plan-  Chronic COPD- stable  c/w O2  CHF- c/w lasix  HTN- stable  c/w lisinopril  A fib- c/w metoprolol, apixaban  HLD-c/w lipitor  OA- c/w tylenol prn  CKD-check labs  CHF- stable- plan as above  Weakness- c/w supportive care and fall precautions.

## 2024-01-17 ENCOUNTER — NURSING HOME VISIT (OUTPATIENT)
Dept: POST ACUTE CARE | Facility: EXTERNAL LOCATION | Age: 89
End: 2024-01-17
Payer: MEDICARE

## 2024-01-17 DIAGNOSIS — I48.20 CHRONIC ATRIAL FIBRILLATION (MULTI): ICD-10-CM

## 2024-01-17 DIAGNOSIS — I50.20 SYSTOLIC CONGESTIVE HEART FAILURE, UNSPECIFIED HF CHRONICITY (MULTI): ICD-10-CM

## 2024-01-17 DIAGNOSIS — J42 CHRONIC BRONCHITIS, UNSPECIFIED CHRONIC BRONCHITIS TYPE (MULTI): ICD-10-CM

## 2024-01-17 PROCEDURE — 99308 SBSQ NF CARE LOW MDM 20: CPT | Performed by: INTERNAL MEDICINE

## 2024-01-17 ASSESSMENT — ENCOUNTER SYMPTOMS
LOSS OF SENSATION IN FEET: 0
OCCASIONAL FEELINGS OF UNSTEADINESS: 1
DEPRESSION: 0

## 2024-01-17 NOTE — LETTER
Patient: Susan Patiño  : 3/29/1926    Encounter Date: 2024    Subjective- resident seen for follow up. She appears well and sitting in her room. Appetite is fair. No falls.  Ros-  Chest-no pain  Respiratory-SOB+  Abdomen-no constipation No nausea/vomiting or diarrhea  Muskuloskeletal-no pain  Psych-no confusion  Vital signs-/64 T 97.7 P 66 Wt 121.9  General-alert, no acute distress  Neck-supple, non tender  Chest-clear breath sounds bilaterally  Cardiovascular-S1S2 regular  Abdomen-bowel sounds present, soft, nontender  Extremities-no edema  Psych-normal mood to affect  Assessment and plan-  Chronic COPD- stable  c/w O2  CHF- c/w lasix  HTN- stable  c/w lisinopril  A fib- c/w metoprolol, apixaban  HLD-c/w lipitor  OA- c/w tylenol prn  CKD-monitor  Weakness- c/w supportive care and fall precautions.      Electronically Signed By: Rajni Acosta MD   24  4:29 PM

## 2024-01-17 NOTE — PROGRESS NOTES
Subjective- resident seen for follow up. She appears well and sitting in her room. Appetite is fair. No falls.  Ros-  Chest-no pain  Respiratory-SOB+  Abdomen-no constipation No nausea/vomiting or diarrhea  Muskuloskeletal-no pain  Psych-no confusion  Vital signs-/64 T 97.7 P 66 Wt 121.9  General-alert, no acute distress  Neck-supple, non tender  Chest-clear breath sounds bilaterally  Cardiovascular-S1S2 regular  Abdomen-bowel sounds present, soft, nontender  Extremities-no edema  Psych-normal mood to affect  Assessment and plan-  Chronic COPD- stable  c/w O2  CHF- c/w lasix  HTN- stable  c/w lisinopril  A fib- c/w metoprolol, apixaban  HLD-c/w lipitor  OA- c/w tylenol prn  CKD-monitor  Weakness- c/w supportive care and fall precautions.

## 2024-02-07 ENCOUNTER — NURSING HOME VISIT (OUTPATIENT)
Dept: POST ACUTE CARE | Facility: EXTERNAL LOCATION | Age: 89
End: 2024-02-07
Payer: MEDICARE

## 2024-02-07 DIAGNOSIS — I50.20 SYSTOLIC CONGESTIVE HEART FAILURE, UNSPECIFIED HF CHRONICITY (MULTI): Primary | ICD-10-CM

## 2024-02-07 DIAGNOSIS — I48.20 CHRONIC ATRIAL FIBRILLATION (MULTI): ICD-10-CM

## 2024-02-07 DIAGNOSIS — J42 CHRONIC BRONCHITIS, UNSPECIFIED CHRONIC BRONCHITIS TYPE (MULTI): ICD-10-CM

## 2024-02-07 DIAGNOSIS — I10 PRIMARY HYPERTENSION: ICD-10-CM

## 2024-02-07 PROCEDURE — 99308 SBSQ NF CARE LOW MDM 20: CPT | Performed by: INTERNAL MEDICINE

## 2024-02-07 NOTE — LETTER
Patient: Susan Patiño  : 3/29/1926    Encounter Date: 2024    Subjective- resident seen for follow up. She appears well and sitting in her room. Appetite is fair. No falls.  Ros-  Chest-no pain  Resp- no cough or SOB  Abdomen-no constipation No nausea/vomiting or diarrhea  Muskuloskeletal-no pain  Psych-no confusion  Vital signs-/64 T 97.9 P 66 Wt 121.9  General-alert, no acute distress  Neck-supple, non tender  Chest-clear breath sounds bilaterally  Cardiovascular-S1S2 regular  Abdomen-bowel sounds present, soft, nontender  Extremities-no edema  Psych-normal mood to affect  Assessment and plan-  Chronic COPD- stable  c/w O2  CHF- c/w lasix  HTN- stable  c/w lisinopril  A fib- c/w metoprolol, apixaban  HLD-c/w lipitor  OA- c/w tylenol prn  CKD-monitor  Weakness- c/w supportive care and fall precautions.      Electronically Signed By: Rajni Acosta MD   24  2:12 PM

## 2024-02-08 NOTE — PROGRESS NOTES
Subjective- resident seen for follow up. She appears well and sitting in her room. Appetite is fair. No falls.  Ros-  Chest-no pain  Resp- no cough or SOB  Abdomen-no constipation No nausea/vomiting or diarrhea  Muskuloskeletal-no pain  Psych-no confusion  Vital signs-/64 T 97.9 P 66 Wt 121.9  General-alert, no acute distress  Neck-supple, non tender  Chest-clear breath sounds bilaterally  Cardiovascular-S1S2 regular  Abdomen-bowel sounds present, soft, nontender  Extremities-no edema  Psych-normal mood to affect  Assessment and plan-  Chronic COPD- stable  c/w O2  CHF- c/w lasix  HTN- stable  c/w lisinopril  A fib- c/w metoprolol, apixaban  HLD-c/w lipitor  OA- c/w tylenol prn  CKD-monitor  Weakness- c/w supportive care and fall precautions.

## 2024-02-09 NOTE — PROGRESS NOTES
Subjective   Patient ID: Susan Patiño is a 89 y.o. female.    Subjective- resident seen for follow up. She appears well and sitting in her room. Appetite is fair. No falls.  Ros-  Chest-no pain  Respiratory-SOB+  Abdomen-no constipation No nausea/vomiting or diarrhea  Muskuloskeletal-no pain  Psych-no confusion  Vital signs-/64 T 97.7 P 58 Wt 121.9  General-alert, no acute distress  Neck-supple, non tender  Chest-clear breath sounds bilaterally  Cardiovascular-S1S2 regular  Abdomen-bowel sounds present, soft, nontender  Extremities-no edema  Psych-normal mood to affect  Assessment and plan-  Chronic COPD- stable  c/w O2  CHF- c/w lasix  HTN- stable  c/w lisinopril  A fib- c/w metoprolol, apixaban  HLD-c/w lipitor  OA- c/w tylenol prn  CKD-check labs  CHF- stable- plan as above  Weakness- c/w supportive care and fall precautions.

## 2024-02-28 ENCOUNTER — NURSING HOME VISIT (OUTPATIENT)
Dept: POST ACUTE CARE | Facility: EXTERNAL LOCATION | Age: 89
End: 2024-02-28
Payer: MEDICARE

## 2024-02-28 DIAGNOSIS — J42 CHRONIC BRONCHITIS, UNSPECIFIED CHRONIC BRONCHITIS TYPE (MULTI): Primary | ICD-10-CM

## 2024-02-28 DIAGNOSIS — I48.20 CHRONIC ATRIAL FIBRILLATION (MULTI): ICD-10-CM

## 2024-02-28 DIAGNOSIS — I10 PRIMARY HYPERTENSION: ICD-10-CM

## 2024-02-28 PROCEDURE — 99308 SBSQ NF CARE LOW MDM 20: CPT | Performed by: INTERNAL MEDICINE

## 2024-02-28 NOTE — PROGRESS NOTES
Subjective- resident seen for follow up.She is lying in her room. Appetite is fair. No falls.  Ros-  Chest-no pain  Resp- no cough or SOB  Abdomen-no constipation No nausea/vomiting or diarrhea  Muskuloskeletal-no pain  Psych-no confusion  Vital signs-/76 T 98 P 58 Wt 124.5  General-alert, no acute distress  Neck-supple, non tender  Chest-clear breath sounds bilaterally  Cardiovascular-S1S2 regular  Abdomen-bowel sounds present, soft, nontender  Extremities-no edema  Psych-normal mood to affect    Assessment and plan-  Chronic COPD- stable  c/w O2  CHF- c/w lasix  HTN- stable  c/w lisinopril  A fib- c/w metoprolol, apixaban  HLD-c/w lipitor  OA- c/w tylenol prn  CKD-monitor  Weakness- c/w supportive care and fall precautions.

## 2024-02-28 NOTE — LETTER
Patient: Susan Patiño  : 3/29/1926    Encounter Date: 2024    Subjective- resident seen for follow up.She is lying in her room. Appetite is fair. No falls.  Ros-  Chest-no pain  Resp- no cough or SOB  Abdomen-no constipation No nausea/vomiting or diarrhea  Muskuloskeletal-no pain  Psych-no confusion  Vital signs-/76 T 98 P 58 Wt 124.5  General-alert, no acute distress  Neck-supple, non tender  Chest-clear breath sounds bilaterally  Cardiovascular-S1S2 regular  Abdomen-bowel sounds present, soft, nontender  Extremities-no edema  Psych-normal mood to affect    Assessment and plan-  Chronic COPD- stable  c/w O2  CHF- c/w lasix  HTN- stable  c/w lisinopril  A fib- c/w metoprolol, apixaban  HLD-c/w lipitor  OA- c/w tylenol prn  CKD-monitor  Weakness- c/w supportive care and fall precautions.      Electronically Signed By: Rajni Acosta MD   24  2:26 PM

## 2024-03-27 ENCOUNTER — NURSING HOME VISIT (OUTPATIENT)
Dept: POST ACUTE CARE | Facility: EXTERNAL LOCATION | Age: 89
End: 2024-03-27
Payer: MEDICARE

## 2024-03-27 DIAGNOSIS — I50.20 SYSTOLIC CONGESTIVE HEART FAILURE, UNSPECIFIED HF CHRONICITY (MULTI): ICD-10-CM

## 2024-03-27 DIAGNOSIS — I48.20 CHRONIC ATRIAL FIBRILLATION (MULTI): ICD-10-CM

## 2024-03-27 DIAGNOSIS — I10 PRIMARY HYPERTENSION: ICD-10-CM

## 2024-03-27 DIAGNOSIS — J42 CHRONIC BRONCHITIS, UNSPECIFIED CHRONIC BRONCHITIS TYPE (MULTI): Primary | ICD-10-CM

## 2024-03-27 PROCEDURE — 99308 SBSQ NF CARE LOW MDM 20: CPT | Performed by: INTERNAL MEDICINE

## 2024-03-27 NOTE — LETTER
Patient: Susan Patiño  : 3/29/1926    Encounter Date: 2024    following changes] this last quarter:  Subjective- resident seen for follow up. She is sitting in her recliner in her room. Appetite is fair. No falls.  Ros-  Chest-no pain  Resp- chronic SOB +  Abdomen-no constipation No nausea/vomiting or diarrhea  Muskuloskeletal-no pain  Psych-no confusion  Vital signs-/67 T 97.9 P 74 Wt 126.1  General-alert, no acute distress  Neck-supple, non tender  Chest-clear breath sounds bilaterally  Cardiovascular-S1S2 regular  Abdomen-bowel sounds present, soft, nontender  Extremities-no edema  Psych-normal mood to affect  Assessment and plan-  Chronic COPD- stable  c/w O2  CHF- stable  c/w lasix  HTN- stable  c/w lisinopril  A fib- c/w metoprolol, apixaban  HLD-c/w lipitor  OA- c/w tylenol prn  CKD-stable  check renal function  Weakness- c/w supportive care and fall precautions.      Electronically Signed By: Rajni cAosta MD   3/27/24  3:06 PM

## 2024-03-27 NOTE — PROGRESS NOTES
following changes] this last quarter:  Subjective- resident seen for follow up. She is sitting in her recliner in her room. Appetite is fair. No falls.  Ros-  Chest-no pain  Resp- chronic SOB +  Abdomen-no constipation No nausea/vomiting or diarrhea  Muskuloskeletal-no pain  Psych-no confusion  Vital signs-/67 T 97.9 P 74 Wt 126.1  General-alert, no acute distress  Neck-supple, non tender  Chest-clear breath sounds bilaterally  Cardiovascular-S1S2 regular  Abdomen-bowel sounds present, soft, nontender  Extremities-no edema  Psych-normal mood to affect  Assessment and plan-  Chronic COPD- stable  c/w O2  CHF- stable  c/w lasix  HTN- stable  c/w lisinopril  A fib- c/w metoprolol, apixaban  HLD-c/w lipitor  OA- c/w tylenol prn  CKD-stable  check renal function  Weakness- c/w supportive care and fall precautions.

## 2024-04-10 ENCOUNTER — NURSING HOME VISIT (OUTPATIENT)
Dept: POST ACUTE CARE | Facility: EXTERNAL LOCATION | Age: 89
End: 2024-04-10
Payer: MEDICARE

## 2024-04-10 DIAGNOSIS — I10 PRIMARY HYPERTENSION: ICD-10-CM

## 2024-04-10 DIAGNOSIS — I48.20 CHRONIC ATRIAL FIBRILLATION (MULTI): ICD-10-CM

## 2024-04-10 DIAGNOSIS — J42 CHRONIC BRONCHITIS, UNSPECIFIED CHRONIC BRONCHITIS TYPE (MULTI): Primary | ICD-10-CM

## 2024-04-10 DIAGNOSIS — I50.20 SYSTOLIC CONGESTIVE HEART FAILURE, UNSPECIFIED HF CHRONICITY (MULTI): ICD-10-CM

## 2024-04-10 PROCEDURE — 99308 SBSQ NF CARE LOW MDM 20: CPT | Performed by: INTERNAL MEDICINE

## 2024-04-10 NOTE — LETTER
Patient: Susan Patiño  : 3/29/1926    Encounter Date: 04/10/2024    Subjective- resident seen for follow up. She is sitting in her recliner in her room. Appetite is fair. No falls.  Ros-  Chest-no pain  Resp- chronic SOB +  Abdomen-no constipation No nausea/vomiting or diarrhea  Muskuloskeletal-no pain  Psych-no confusion  Vital signs-/67 T 97.9 P 74 Wt 121.6  General-alert, no acute distress  Neck-supple, non tender  Chest-clear breath sounds bilaterally  Cardiovascular-S1S2 regular  Abdomen-bowel sounds present, soft, nontender  Extremities-no edema  Psych-normal mood to affect  Assessment and plan-  Chronic COPD- stable  c/w O2  CHF- stable  c/w lasix  HTN- stable  c/w lisinopril  A fib- c/w metoprolol, apixaban  HLD-c/w lipitor  OA- c/w tylenol prn  CKD-stable  check renal function  Weakness- c/w supportive care and fall precautions.  recent labs reviewed      Electronically Signed By: Rajni Acosta MD   24  2:50 PM

## 2024-04-11 NOTE — PROGRESS NOTES
Subjective- resident seen for follow up. She is sitting in her recliner in her room. Appetite is fair. No falls.  Ros-  Chest-no pain  Resp- chronic SOB +  Abdomen-no constipation No nausea/vomiting or diarrhea  Muskuloskeletal-no pain  Psych-no confusion  Vital signs-/67 T 97.9 P 74 Wt 121.6  General-alert, no acute distress  Neck-supple, non tender  Chest-clear breath sounds bilaterally  Cardiovascular-S1S2 regular  Abdomen-bowel sounds present, soft, nontender  Extremities-no edema  Psych-normal mood to affect  Assessment and plan-  Chronic COPD- stable  c/w O2  CHF- stable  c/w lasix  HTN- stable  c/w lisinopril  A fib- c/w metoprolol, apixaban  HLD-c/w lipitor  OA- c/w tylenol prn  CKD-stable  check renal function  Weakness- c/w supportive care and fall precautions.  recent labs reviewed

## 2024-05-08 ENCOUNTER — NURSING HOME VISIT (OUTPATIENT)
Dept: POST ACUTE CARE | Facility: EXTERNAL LOCATION | Age: 89
End: 2024-05-08
Payer: MEDICARE

## 2024-05-08 DIAGNOSIS — I50.20 SYSTOLIC CONGESTIVE HEART FAILURE, UNSPECIFIED HF CHRONICITY (MULTI): ICD-10-CM

## 2024-05-08 DIAGNOSIS — I48.20 CHRONIC ATRIAL FIBRILLATION (MULTI): ICD-10-CM

## 2024-05-08 DIAGNOSIS — J42 CHRONIC BRONCHITIS, UNSPECIFIED CHRONIC BRONCHITIS TYPE (MULTI): Primary | ICD-10-CM

## 2024-05-08 DIAGNOSIS — I10 PRIMARY HYPERTENSION: ICD-10-CM

## 2024-05-08 PROCEDURE — 99308 SBSQ NF CARE LOW MDM 20: CPT | Performed by: INTERNAL MEDICINE

## 2024-05-08 NOTE — PROGRESS NOTES
Subjective- resident seen for follow up. She is sitting in her recliner in her room. Appetite is fair. No falls.  Ros-  Gen- weakness  Chest-no pain  Resp- chronic SOB +  Abdomen-no constipation No nausea/vomiting or diarrhea  Muskuloskeletal-no pain  Psych-no confusion  Vital signs-/67 T 97.8 P 74 Wt 121.6  General-alert, no acute distress  Neck-supple, non tender  Chest-clear breath sounds bilaterally  Cardiovascular-S1S2 regular  Abdomen-bowel sounds present, soft, nontender  Extremities-no edema  Psych-normal mood to affect  Assessment and plan-  Chronic COPD- stable  c/w O2  CHF- stable  c/w lasix  HTN- stable  c/w lisinopril  A fib- c/w metoprolol, apixaban  HLD-c/w lipitor  OA- c/w tylenol prn  CKD-stable  Weakness- c/w supportive care and fall precautions.

## 2024-05-08 NOTE — LETTER
Patient: Susan Patiño  : 3/29/1926    Encounter Date: 2024    Subjective- resident seen for follow up. She is sitting in her recliner in her room. Appetite is fair. No falls.  Ros-  Gen- weakness  Chest-no pain  Resp- chronic SOB +  Abdomen-no constipation No nausea/vomiting or diarrhea  Muskuloskeletal-no pain  Psych-no confusion  Vital signs-/67 T 97.8 P 74 Wt 121.6  General-alert, no acute distress  Neck-supple, non tender  Chest-clear breath sounds bilaterally  Cardiovascular-S1S2 regular  Abdomen-bowel sounds present, soft, nontender  Extremities-no edema  Psych-normal mood to affect  Assessment and plan-  Chronic COPD- stable  c/w O2  CHF- stable  c/w lasix  HTN- stable  c/w lisinopril  A fib- c/w metoprolol, apixaban  HLD-c/w lipitor  OA- c/w tylenol prn  CKD-stable  Weakness- c/w supportive care and fall precautions.      Electronically Signed By: Rajni Acosta MD   24  2:11 PM

## 2024-05-29 ENCOUNTER — NURSING HOME VISIT (OUTPATIENT)
Dept: POST ACUTE CARE | Facility: EXTERNAL LOCATION | Age: 89
End: 2024-05-29
Payer: MEDICARE

## 2024-05-29 DIAGNOSIS — I50.20 SYSTOLIC CONGESTIVE HEART FAILURE, UNSPECIFIED HF CHRONICITY (MULTI): ICD-10-CM

## 2024-05-29 DIAGNOSIS — I48.20 CHRONIC ATRIAL FIBRILLATION (MULTI): ICD-10-CM

## 2024-05-29 DIAGNOSIS — R53.1 WEAKNESS: ICD-10-CM

## 2024-05-29 DIAGNOSIS — J42 CHRONIC BRONCHITIS, UNSPECIFIED CHRONIC BRONCHITIS TYPE (MULTI): Primary | ICD-10-CM

## 2024-05-29 DIAGNOSIS — I10 PRIMARY HYPERTENSION: ICD-10-CM

## 2024-05-29 PROCEDURE — 99308 SBSQ NF CARE LOW MDM 20: CPT | Performed by: INTERNAL MEDICINE

## 2024-05-29 NOTE — LETTER
Patient: Susan Patiño  : 3/29/1926    Encounter Date: 2024    Subjective- resident seen for follow up. She is sitting in her recliner in her room. Appetite is fair. No falls.  Ros-  Gen- weakness  Chest-no pain or palpitations  Resp- chronic SOB +  Abdomen-no constipation No nausea/vomiting or diarrhea  Muskuloskeletal-no pain  Psych-no confusion  Vital signs-/80 T 97.7 P 67 Wt 123.6  General-alert, no acute distress  Neck-supple, non tender  Chest-clear breath sounds bilaterally  Cardiovascular-S1S2 regular  Abdomen-bowel sounds present, soft, nontender  Extremities-no edema  Psych-normal mood to affect  Assessment and plan-  Chronic COPD- stable  c/w O2  CHF- stable  c/w lasix  HTN- stable  c/w lisinopril  A fib- c/w metoprolol, apixaban  HLD-c/w lipitor  OA- c/w tylenol prn  CKD-stable  Weakness- c/w supportive care and fall precautions.      Electronically Signed By: Rajni Acosta MD   24 11:22 AM

## 2024-05-31 NOTE — PROGRESS NOTES
Subjective- resident seen for follow up. She is sitting in her recliner in her room. Appetite is fair. No falls.  Ros-  Gen- weakness  Chest-no pain or palpitations  Resp- chronic SOB +  Abdomen-no constipation No nausea/vomiting or diarrhea  Muskuloskeletal-no pain  Psych-no confusion  Vital signs-/80 T 97.7 P 67 Wt 123.6  General-alert, no acute distress  Neck-supple, non tender  Chest-clear breath sounds bilaterally  Cardiovascular-S1S2 regular  Abdomen-bowel sounds present, soft, nontender  Extremities-no edema  Psych-normal mood to affect  Assessment and plan-  Chronic COPD- stable  c/w O2  CHF- stable  c/w lasix  HTN- stable  c/w lisinopril  A fib- c/w metoprolol, apixaban  HLD-c/w lipitor  OA- c/w tylenol prn  CKD-stable  Weakness- c/w supportive care and fall precautions.

## 2024-06-19 ENCOUNTER — NURSING HOME VISIT (OUTPATIENT)
Dept: POST ACUTE CARE | Facility: EXTERNAL LOCATION | Age: 89
End: 2024-06-19
Payer: MEDICARE

## 2024-06-19 DIAGNOSIS — I50.20 SYSTOLIC CONGESTIVE HEART FAILURE, UNSPECIFIED HF CHRONICITY (MULTI): ICD-10-CM

## 2024-06-19 DIAGNOSIS — I10 PRIMARY HYPERTENSION: ICD-10-CM

## 2024-06-19 DIAGNOSIS — J42 CHRONIC BRONCHITIS, UNSPECIFIED CHRONIC BRONCHITIS TYPE (MULTI): Primary | ICD-10-CM

## 2024-06-19 DIAGNOSIS — I48.20 CHRONIC ATRIAL FIBRILLATION (MULTI): ICD-10-CM

## 2024-06-19 DIAGNOSIS — R53.1 WEAKNESS: ICD-10-CM

## 2024-06-19 PROCEDURE — 99308 SBSQ NF CARE LOW MDM 20: CPT | Performed by: INTERNAL MEDICINE

## 2024-06-19 NOTE — LETTER
Patient: Susan Patiño  : 3/29/1926    Encounter Date: 2024    Subjective- resident seen for follow up. She is sitting in her room. Appetite is fair. No falls.  Ros-  Gen- weakness  Chest-no pain or palpitations  Resp- chronic SOB +  Abdomen-no constipation No nausea/vomiting or diarrhea  Muskuloskeletal-no pain  Psych-no confusion  Vital signs-/92 T 97.7 P 63 Wt 124.6  General-alert, no acute distress  Neck-supple, non tender  Chest-clear breath sounds bilaterally  Cardiovascular-S1S2 regular  Abdomen-bowel sounds present, soft, nontender  Extremities-no edema  Psych-normal mood to affect  Assessment and plan-  Chronic COPD-stable  c/w O2  CHF- stable  c/w lasix  HTN- stable  c/w lisinopril  A fib- c/w metoprolol, apixaban  HLD-c/w lipitor  OA- c/w tylenol prn  CKD-stable  Weakness- c/w supportive care and fall precautions.      Electronically Signed By: Rajni Acosta MD   24 11:52 AM

## 2024-06-20 NOTE — PROGRESS NOTES
Subjective- resident seen for follow up. She is sitting in her room. Appetite is fair. No falls.  Ros-  Gen- weakness  Chest-no pain or palpitations  Resp- chronic SOB +  Abdomen-no constipation No nausea/vomiting or diarrhea  Muskuloskeletal-no pain  Psych-no confusion  Vital signs-/92 T 97.7 P 63 Wt 124.6  General-alert, no acute distress  Neck-supple, non tender  Chest-clear breath sounds bilaterally  Cardiovascular-S1S2 regular  Abdomen-bowel sounds present, soft, nontender  Extremities-no edema  Psych-normal mood to affect  Assessment and plan-  Chronic COPD-stable  c/w O2  CHF- stable  c/w lasix  HTN- stable  c/w lisinopril  A fib- c/w metoprolol, apixaban  HLD-c/w lipitor  OA- c/w tylenol prn  CKD-stable  Weakness- c/w supportive care and fall precautions.

## 2024-07-10 ENCOUNTER — NURSING HOME VISIT (OUTPATIENT)
Dept: POST ACUTE CARE | Facility: EXTERNAL LOCATION | Age: 89
End: 2024-07-10
Payer: MEDICARE

## 2024-07-10 DIAGNOSIS — I10 PRIMARY HYPERTENSION: ICD-10-CM

## 2024-07-10 DIAGNOSIS — I50.20 SYSTOLIC CONGESTIVE HEART FAILURE, UNSPECIFIED HF CHRONICITY (MULTI): ICD-10-CM

## 2024-07-10 DIAGNOSIS — R53.1 WEAKNESS: ICD-10-CM

## 2024-07-10 DIAGNOSIS — I48.20 CHRONIC ATRIAL FIBRILLATION (MULTI): ICD-10-CM

## 2024-07-10 DIAGNOSIS — J42 CHRONIC BRONCHITIS, UNSPECIFIED CHRONIC BRONCHITIS TYPE (MULTI): Primary | ICD-10-CM

## 2024-07-10 PROCEDURE — 99308 SBSQ NF CARE LOW MDM 20: CPT | Performed by: INTERNAL MEDICINE

## 2024-07-10 NOTE — LETTER
Patient: Susan Patiño  : 3/29/1926    Encounter Date: 07/10/2024    Subjective- resident seen for follow up. She is sitting in her room. Appetite is fair. No falls.  Ros-  Gen- weakness+  Chest-no pain or palpitations  Resp- chronic SOB +  Abdomen-no constipation No nausea/vomiting or diarrhea  Muskuloskeletal-no pain  Psych-no confusion  Vital signs-/92 T 97.7 P 63 Wt 124.8  General-alert, no acute distress  Neck-supple, non tender  Chest-clear breath sounds bilaterally  Cardiovascular-S1S2 regular  Abdomen-bowel sounds present, soft, nontender  Extremities- trace edema  Psych-normal mood to affect  Assessment and plan-  Chronic COPD-stable  c/w O2  CHF- stable  c/w lasix  HTN- stable  c/w lisinopril  A fib- c/w metoprolol, apixaban  HLD-c/w lipitor  OA- c/w tylenol prn  CKD-stable  Weakness- c/w supportive care and fall precautions.      Electronically Signed By: Rajni Acosta MD   24  1:17 PM

## 2024-07-11 NOTE — PROGRESS NOTES
Subjective- resident seen for follow up. She is sitting in her room. Appetite is fair. No falls.  Ros-  Gen- weakness+  Chest-no pain or palpitations  Resp- chronic SOB +  Abdomen-no constipation No nausea/vomiting or diarrhea  Muskuloskeletal-no pain  Psych-no confusion  Vital signs-/92 T 97.7 P 63 Wt 124.8  General-alert, no acute distress  Neck-supple, non tender  Chest-clear breath sounds bilaterally  Cardiovascular-S1S2 regular  Abdomen-bowel sounds present, soft, nontender  Extremities- trace edema  Psych-normal mood to affect  Assessment and plan-  Chronic COPD-stable  c/w O2  CHF- stable  c/w lasix  HTN- stable  c/w lisinopril  A fib- c/w metoprolol, apixaban  HLD-c/w lipitor  OA- c/w tylenol prn  CKD-stable  Weakness- c/w supportive care and fall precautions.

## 2024-08-07 ENCOUNTER — NURSING HOME VISIT (OUTPATIENT)
Dept: POST ACUTE CARE | Facility: EXTERNAL LOCATION | Age: 89
End: 2024-08-07
Payer: MEDICARE

## 2024-08-07 DIAGNOSIS — I50.20 SYSTOLIC CONGESTIVE HEART FAILURE, UNSPECIFIED HF CHRONICITY (MULTI): ICD-10-CM

## 2024-08-07 DIAGNOSIS — I10 PRIMARY HYPERTENSION: ICD-10-CM

## 2024-08-07 DIAGNOSIS — I48.20 CHRONIC ATRIAL FIBRILLATION (MULTI): ICD-10-CM

## 2024-08-07 DIAGNOSIS — J42 CHRONIC BRONCHITIS, UNSPECIFIED CHRONIC BRONCHITIS TYPE (MULTI): Primary | ICD-10-CM

## 2024-08-07 PROCEDURE — 99308 SBSQ NF CARE LOW MDM 20: CPT | Performed by: INTERNAL MEDICINE

## 2024-08-07 NOTE — LETTER
Patient: Susan Patiño  : 3/29/1926    Encounter Date: 2024    Subjective- resident seen for follow up. She is sitting in her room. Appetite is fair. No falls.  Ros-  Gen- weakness+  Chest-no pain or palpitations  Resp- chronic SOB +  Abdomen-no constipation No nausea/vomiting or diarrhea  Muskuloskeletal-no pain  Psych-no confusion  Vital signs-/92 T 97.7 P 63 Wt 124.8  General-alert, no acute distress  Neck-supple, non tender  Chest-clear breath sounds bilaterally  Cardiovascular-S1S2 regular  Abdomen-bowel sounds present, soft, nontender  Extremities- trace edema  Psych-normal mood to affect    Assessment and plan-  Chronic COPD-stable  c/w O2  CHF- stable  c/w lasix  HTN- stable  c/w lisinopril  A fib- c/w metoprolol, apixaban  HLD-c/w lipitor  OA- c/w tylenol prn  CKD-stable  Weakness- c/w supportive care and fall precautions.      Electronically Signed By: Rajni Acosta MD   24  9:11 PM

## 2024-08-28 ENCOUNTER — NURSING HOME VISIT (OUTPATIENT)
Dept: POST ACUTE CARE | Facility: EXTERNAL LOCATION | Age: 89
End: 2024-08-28
Payer: MEDICARE

## 2024-08-28 DIAGNOSIS — I10 PRIMARY HYPERTENSION: ICD-10-CM

## 2024-08-28 DIAGNOSIS — I48.20 CHRONIC ATRIAL FIBRILLATION (MULTI): ICD-10-CM

## 2024-08-28 DIAGNOSIS — I50.20 SYSTOLIC CONGESTIVE HEART FAILURE, UNSPECIFIED HF CHRONICITY (MULTI): ICD-10-CM

## 2024-08-28 DIAGNOSIS — J42 CHRONIC BRONCHITIS, UNSPECIFIED CHRONIC BRONCHITIS TYPE (MULTI): Primary | ICD-10-CM

## 2024-08-28 PROCEDURE — 99308 SBSQ NF CARE LOW MDM 20: CPT | Performed by: INTERNAL MEDICINE

## 2024-08-30 NOTE — PROGRESS NOTES
Subjective- resident seen for follow up. She is lying in her room. Appetite is fair. No falls.  Ros-  Gen- weakness+  Chest-no pain or palpitations  Resp- chronic SOB +  Abdomen-no constipation No nausea/vomiting or diarrhea  Muskuloskeletal-no pain  Psych-no confusion  Vital signs-/96 T 97.7 P 63 Wt 121.8  General-alert, no acute distress  Neck-supple, non tender  Chest-clear breath sounds bilaterally  Cardiovascular-S1S2 regular  Abdomen-bowel sounds present, soft, nontender  Extremities- trace edema  Psych-normal mood to affect  Assessment and plan-  Chronic COPD-stable  c/w O2  CHF- stable  c/w lasix  HTN- stable  c/w lisinopril  A fib- c/w metoprolol, apixaban  HLD-c/w lipitor  OA- c/w tylenol prn  CKD-stable  Weakness- c/w supportive care and fall precautions.

## 2024-10-30 ENCOUNTER — NURSING HOME VISIT (OUTPATIENT)
Dept: POST ACUTE CARE | Facility: EXTERNAL LOCATION | Age: 89
End: 2024-10-30
Payer: MEDICARE

## 2024-10-30 DIAGNOSIS — I48.20 CHRONIC ATRIAL FIBRILLATION (MULTI): ICD-10-CM

## 2024-10-30 DIAGNOSIS — I50.20 SYSTOLIC CONGESTIVE HEART FAILURE, UNSPECIFIED HF CHRONICITY: Primary | ICD-10-CM

## 2024-10-30 DIAGNOSIS — I10 PRIMARY HYPERTENSION: ICD-10-CM

## 2024-10-30 PROCEDURE — 99308 SBSQ NF CARE LOW MDM 20: CPT | Performed by: INTERNAL MEDICINE

## 2024-11-20 ENCOUNTER — NURSING HOME VISIT (OUTPATIENT)
Dept: POST ACUTE CARE | Facility: EXTERNAL LOCATION | Age: 89
End: 2024-11-20
Payer: MEDICARE

## 2024-11-20 DIAGNOSIS — I10 PRIMARY HYPERTENSION: ICD-10-CM

## 2024-11-20 DIAGNOSIS — J42 CHRONIC BRONCHITIS, UNSPECIFIED CHRONIC BRONCHITIS TYPE (MULTI): ICD-10-CM

## 2024-11-20 DIAGNOSIS — I50.20 SYSTOLIC CONGESTIVE HEART FAILURE, UNSPECIFIED HF CHRONICITY: ICD-10-CM

## 2024-11-20 DIAGNOSIS — I48.20 CHRONIC ATRIAL FIBRILLATION (MULTI): Primary | ICD-10-CM

## 2024-11-20 PROCEDURE — 99308 SBSQ NF CARE LOW MDM 20: CPT | Performed by: INTERNAL MEDICINE

## 2024-11-20 NOTE — LETTER
Patient: Susan Patiño  : 3/29/1926    Encounter Date: 2024    Subjective- resident seen for follow up. She is sitting in her room. Appetite is fair. No falls.  Ros-  Gen- weakness+  Chest-no pain or palpitations  Resp- chronic SOB +  Abdomen-no constipation No nausea/vomiting or diarrhea  Muskuloskeletal-no pain  Psych-no confusion  Vital signs-/92T 98.1 P 60 Wt 123.7  General-alert, no acute distress  Neck-supple, non tender  Chest-clear breath sounds bilaterally  Cardiovascular-S1S2 regular  Abdomen-bowel sounds present, soft, nontender  Extremities- trace edema  Psych-normal mood to affect  Assessment and plan-  Chronic COPD-stable  CHF- stable  c/w lasix  HTN- stable  c/w lisinopril  A fib- c/w metoprolol, apixaban  HLD-c/w lipitor  OA- c/w tylenol prn  CKD-stable BUN 38 ,creatinine 1.5  Weakness-fall precautions      Electronically Signed By: Rajni Acosta MD   24 12:15 PM

## 2024-11-22 NOTE — PROGRESS NOTES
Subjective- resident seen for follow up. She is sitting in her room. Appetite is fair. No falls.  Ros-  Gen- weakness+  Chest-no pain or palpitations  Resp- chronic SOB +  Abdomen-no constipation No nausea/vomiting or diarrhea  Muskuloskeletal-no pain  Psych-no confusion  Vital signs-/92T 98.1 P 60 Wt 123.7  General-alert, no acute distress  Neck-supple, non tender  Chest-clear breath sounds bilaterally  Cardiovascular-S1S2 regular  Abdomen-bowel sounds present, soft, nontender  Extremities- trace edema  Psych-normal mood to affect  Assessment and plan-  Chronic COPD-stable  CHF- stable  c/w lasix  HTN- stable  c/w lisinopril  A fib- c/w metoprolol, apixaban  HLD-c/w lipitor  OA- c/w tylenol prn  CKD-stable BUN 38 ,creatinine 1.5  Weakness-fall precautions

## 2024-12-04 ENCOUNTER — NURSING HOME VISIT (OUTPATIENT)
Dept: POST ACUTE CARE | Facility: EXTERNAL LOCATION | Age: 89
End: 2024-12-04
Payer: MEDICARE

## 2024-12-04 DIAGNOSIS — I50.20 SYSTOLIC CONGESTIVE HEART FAILURE, UNSPECIFIED HF CHRONICITY: ICD-10-CM

## 2024-12-04 DIAGNOSIS — I48.20 CHRONIC ATRIAL FIBRILLATION (MULTI): Primary | ICD-10-CM

## 2024-12-04 DIAGNOSIS — I10 PRIMARY HYPERTENSION: ICD-10-CM

## 2024-12-04 PROCEDURE — 99308 SBSQ NF CARE LOW MDM 20: CPT | Performed by: INTERNAL MEDICINE

## 2024-12-04 NOTE — LETTER
Patient: Susan Patiño  : 3/29/1926    Encounter Date: 2024    Subjective- resident seen for follow up. She is lying in her room. Appetite is fair. No falls.  Ros-  Gen- weakness+  Chest-no pain or palpitations  Resp- chronic SOB +  Abdomen-no constipation No nausea/vomiting or diarrhea  Muskuloskeletal-no pain  Psych-no confusion  Vital signs-/92T 98.1 P 60 Wt 122 lbs  General-alert, no acute distress  Neck-supple, non tender  Chest-clear breath sounds bilaterally  Cardiovascular-S1S2 regular  Abdomen-bowel sounds present, soft, nontender  Extremities- trace edema  Psych-normal mood to affect  Assessment and plan-  Chronic COPD-stable  CHF- stable  c/w lasix  HTN- stable  c/w lisinopril  A fib- c/w metoprolol, apixaban  HLD-c/w lipitor  OA- c/w tylenol prn  CKD-stable BUN 38 ,creatinine 1.5  Weakness-fall precautions      Electronically Signed By: Rajni Acosta MD   24  6:25 PM

## 2024-12-08 NOTE — PROGRESS NOTES
Subjective- resident seen for follow up. She is lying in her room. Appetite is fair. No falls.  Ros-  Gen- weakness+  Chest-no pain or palpitations  Resp- chronic SOB +  Abdomen-no constipation No nausea/vomiting or diarrhea  Muskuloskeletal-no pain  Psych-no confusion  Vital signs-/92T 98.1 P 60 Wt 122 lbs  General-alert, no acute distress  Neck-supple, non tender  Chest-clear breath sounds bilaterally  Cardiovascular-S1S2 regular  Abdomen-bowel sounds present, soft, nontender  Extremities- trace edema  Psych-normal mood to affect  Assessment and plan-  Chronic COPD-stable  CHF- stable  c/w lasix  HTN- stable  c/w lisinopril  A fib- c/w metoprolol, apixaban  HLD-c/w lipitor  OA- c/w tylenol prn  CKD-stable BUN 38 ,creatinine 1.5  Weakness-fall precautions

## 2025-01-15 ENCOUNTER — NURSING HOME VISIT (OUTPATIENT)
Dept: POST ACUTE CARE | Facility: EXTERNAL LOCATION | Age: OVER 89
End: 2025-01-15
Payer: MEDICARE

## 2025-01-15 DIAGNOSIS — E78.49 OTHER HYPERLIPIDEMIA: ICD-10-CM

## 2025-01-15 DIAGNOSIS — I50.9 CONGESTIVE HEART FAILURE, UNSPECIFIED HF CHRONICITY, UNSPECIFIED HEART FAILURE TYPE: ICD-10-CM

## 2025-01-15 DIAGNOSIS — N18.9 CHRONIC KIDNEY DISEASE, UNSPECIFIED CKD STAGE: ICD-10-CM

## 2025-01-15 DIAGNOSIS — J44.9 COPD MIXED TYPE (MULTI): ICD-10-CM

## 2025-01-15 DIAGNOSIS — I10 PRIMARY HYPERTENSION: Primary | ICD-10-CM

## 2025-01-15 DIAGNOSIS — I48.20 CHRONIC ATRIAL FIBRILLATION (MULTI): ICD-10-CM

## 2025-01-15 PROCEDURE — 99308 SBSQ NF CARE LOW MDM 20: CPT | Performed by: INTERNAL MEDICINE

## 2025-01-15 NOTE — LETTER
Patient: Susan Patiño  : 3/29/1926    Encounter Date: 01/15/2025    Subjective- resident seen for follow up. She is lying in her room. Appetite is fair. No falls.  Ros-  Gen- weakness+  Chest-no pain or palpitations  Resp- chronic SOB +  Abdomen-no constipation No nausea/vomiting or diarrhea  Muskuloskeletal-no pain  Psych-no confusion  Vital signs-/92T 98.1 P 60 Wt 119.2 lbs  General-alert, no acute distress  Neck-supple, non tender  Chest-clear breath sounds bilaterally  Cardiovascular-S1S2 regular  Abdomen-bowel sounds present, soft, nontender  Extremities- trace edema  Psych-normal mood to affect  Assessment and plan-  Chronic COPD-stable  CHF- stable  c/w lasix  HTN- stable  c/w lisinopril  A fib- c/w metoprolol, apixaban  HLD-c/w lipitor  OA- c/w tylenol prn  CKD-stable BUN 32 ,creatinine 1.1  Weakness-fall precautions      Electronically Signed By: Rajni Acosta MD   1/15/25  5:05 PM

## 2025-02-12 ENCOUNTER — NURSING HOME VISIT (OUTPATIENT)
Dept: POST ACUTE CARE | Facility: EXTERNAL LOCATION | Age: OVER 89
End: 2025-02-12
Payer: MEDICARE

## 2025-02-12 DIAGNOSIS — E78.49 OTHER HYPERLIPIDEMIA: ICD-10-CM

## 2025-02-12 DIAGNOSIS — I48.20 CHRONIC ATRIAL FIBRILLATION (MULTI): ICD-10-CM

## 2025-02-12 DIAGNOSIS — N18.9 CHRONIC KIDNEY DISEASE, UNSPECIFIED CKD STAGE: ICD-10-CM

## 2025-02-12 DIAGNOSIS — I10 PRIMARY HYPERTENSION: Primary | ICD-10-CM

## 2025-02-12 PROCEDURE — 99308 SBSQ NF CARE LOW MDM 20: CPT | Performed by: INTERNAL MEDICINE

## 2025-02-12 NOTE — LETTER
Patient: Susan Patiño  : 3/29/1926    Encounter Date: 2025    Subjective- resident seen for follow up. She is sitting in her room. Appetite is fair. No falls.  Ros-  Gen- weakness+  Chest-no pain or palpitations  Resp- chronic SOB +  Abdomen-no constipation No nausea/vomiting or diarrhea  Muskuloskeletal-no pain  Psych-no confusion  Vital signs-/92 T 98.1 P 60 Wt 120.2 lbs  General-alert, no acute distress  Neck-supple, non tender  Chest-clear breath sounds bilaterally  Cardiovascular-S1S2 regular  Abdomen-bowel sounds present, soft, nontender  Extremities- trace edema  Psych-normal mood to affect  Assessment and plan-  Chronic COPD-stable  CHF- stable  c/w lasix  HTN- stable  c/w lisinopril  A fib- c/w metoprolol, apixaban  HLD-c/w lipitor  OA- c/w tylenol prn  CKD-stable BUN 32 ,creatinine 1.1  Weakness-fall precautions      Electronically Signed By: Rajni Acosta MD   25  2:18 PM

## 2025-02-17 NOTE — PROGRESS NOTES
Subjective- resident seen for follow up. She is sitting in her room. Appetite is fair. No falls.  Ros-  Gen- weakness+  Chest-no pain or palpitations  Resp- chronic SOB +  Abdomen-no constipation No nausea/vomiting or diarrhea  Muskuloskeletal-no pain  Psych-no confusion  Vital signs-/92 T 98.1 P 60 Wt 120.2 lbs  General-alert, no acute distress  Neck-supple, non tender  Chest-clear breath sounds bilaterally  Cardiovascular-S1S2 regular  Abdomen-bowel sounds present, soft, nontender  Extremities- trace edema  Psych-normal mood to affect  Assessment and plan-  Chronic COPD-stable  CHF- stable  c/w lasix  HTN- stable  c/w lisinopril  A fib- c/w metoprolol, apixaban  HLD-c/w lipitor  OA- c/w tylenol prn  CKD-stable BUN 32 ,creatinine 1.1  Weakness-fall precautions

## 2025-03-05 ENCOUNTER — NURSING HOME VISIT (OUTPATIENT)
Dept: POST ACUTE CARE | Facility: EXTERNAL LOCATION | Age: OVER 89
End: 2025-03-05
Payer: MEDICARE

## 2025-03-05 DIAGNOSIS — N18.9 CHRONIC KIDNEY DISEASE, UNSPECIFIED CKD STAGE: ICD-10-CM

## 2025-03-05 DIAGNOSIS — I50.9 CONGESTIVE HEART FAILURE, UNSPECIFIED HF CHRONICITY, UNSPECIFIED HEART FAILURE TYPE: ICD-10-CM

## 2025-03-05 DIAGNOSIS — I10 PRIMARY HYPERTENSION: ICD-10-CM

## 2025-03-05 DIAGNOSIS — J44.9 COPD MIXED TYPE (MULTI): ICD-10-CM

## 2025-03-05 DIAGNOSIS — I48.20 CHRONIC ATRIAL FIBRILLATION (MULTI): Primary | ICD-10-CM

## 2025-03-05 PROCEDURE — 99308 SBSQ NF CARE LOW MDM 20: CPT | Performed by: INTERNAL MEDICINE

## 2025-03-05 NOTE — LETTER
Patient: Susan Patiño  : 3/29/1926    Encounter Date: 2025    Subjective- resident seen for follow up. She is sitting in her room. Appetite is fair. No falls.  Ros-  Gen- weakness+  Chest-no pain or palpitations  Resp- chronic SOB +  Abdomen-no constipation No nausea/vomiting or diarrhea  Muskuloskeletal-no pain  Psych-no confusion  Vital signs-/92 T 98.1 P 60 Wt 119.2 lbs  General-alert, no acute distress  Neck-supple, non tender  Chest-clear breath sounds bilaterally  Cardiovascular-S1S2 regular  Abdomen-bowel sounds present, soft, nontender  Extremities- trace edema  Psych-normal mood to affect  Assessment and plan-  Chronic COPD-stable  CHF- stable  c/w lasix  HTN- stable  c/w lisinopril  A fib- c/w metoprolol, apixaban  HLD-c/w lipitor  OA- c/w tylenol prn  CKD-stable BUN 32 ,creatinine 1.1  Weakness-fall precautions      Electronically Signed By: Rajni Acosta MD   3/6/25  1:43 PM

## 2025-03-06 ASSESSMENT — ENCOUNTER SYMPTOMS
DEPRESSION: 0
OCCASIONAL FEELINGS OF UNSTEADINESS: 1
LOSS OF SENSATION IN FEET: 0

## 2025-03-06 NOTE — PROGRESS NOTES
Subjective- resident seen for follow up. She is sitting in her room. Appetite is fair. No falls.  Ros-  Gen- weakness+  Chest-no pain or palpitations  Resp- chronic SOB +  Abdomen-no constipation No nausea/vomiting or diarrhea  Muskuloskeletal-no pain  Psych-no confusion  Vital signs-/92 T 98.1 P 60 Wt 119.2 lbs  General-alert, no acute distress  Neck-supple, non tender  Chest-clear breath sounds bilaterally  Cardiovascular-S1S2 regular  Abdomen-bowel sounds present, soft, nontender  Extremities- trace edema  Psych-normal mood to affect  Assessment and plan-  Chronic COPD-stable  CHF- stable  c/w lasix  HTN- stable  c/w lisinopril  A fib- c/w metoprolol, apixaban  HLD-c/w lipitor  OA- c/w tylenol prn  CKD-stable BUN 32 ,creatinine 1.1  Weakness-fall precautions

## 2025-04-18 ENCOUNTER — NURSING HOME VISIT (OUTPATIENT)
Dept: POST ACUTE CARE | Facility: EXTERNAL LOCATION | Age: OVER 89
End: 2025-04-18
Payer: MEDICARE

## 2025-04-18 DIAGNOSIS — I10 PRIMARY HYPERTENSION: ICD-10-CM

## 2025-04-18 DIAGNOSIS — J44.9 COPD MIXED TYPE (MULTI): ICD-10-CM

## 2025-04-18 DIAGNOSIS — N18.9 CHRONIC KIDNEY DISEASE, UNSPECIFIED CKD STAGE: ICD-10-CM

## 2025-04-18 DIAGNOSIS — I48.20 CHRONIC ATRIAL FIBRILLATION (MULTI): Primary | ICD-10-CM

## 2025-04-18 DIAGNOSIS — I50.9 CONGESTIVE HEART FAILURE, UNSPECIFIED HF CHRONICITY, UNSPECIFIED HEART FAILURE TYPE: ICD-10-CM

## 2025-04-18 PROCEDURE — 99308 SBSQ NF CARE LOW MDM 20: CPT | Performed by: INTERNAL MEDICINE

## 2025-04-18 NOTE — LETTER
Patient: Susan Patiño  : 3/29/1926    Encounter Date: 2025    Subjective- resident seen for follow up. She is lying in her room. Appetite is fair. No falls.  Ros-  Gen- weakness+  Chest-no pain or palpitations  Resp- chronic SOB +  Abdomen-no constipation No nausea/vomiting or diarrhea  Muskuloskeletal-no pain  Psych-no confusion  Vital signs-/81 T 97.8 P 87 Wt 121.2 lbs  General-alert, no acute distress  Neck-supple, non tender  Chest-clear breath sounds bilaterally  Cardiovascular-S1S2 regular  Abdomen-bowel sounds present, soft, nontender  Extremities- trace edema  Psych-normal mood to affect  Assessment and plan-  Chronic COPD-stable  CHF- stable  c/w lasix  HTN- stable  c/w lisinopril  A fib- c/w metoprolol, apixaban  HLD-c/w lipitor  OA- c/w tylenol prn  CKD- BUN 30 ,creatinine 1.4  Weakness-fall precautions  Repeat labs next week    Electronically Signed By: Rajni Acosta MD   25 12:00 PM

## 2025-04-22 NOTE — PROGRESS NOTES
Subjective- resident seen for follow up. She is lying in her room. Appetite is fair. No falls.  Ros-  Gen- weakness+  Chest-no pain or palpitations  Resp- chronic SOB +  Abdomen-no constipation No nausea/vomiting or diarrhea  Muskuloskeletal-no pain  Psych-no confusion  Vital signs-/81 T 97.8 P 87 Wt 121.2 lbs  General-alert, no acute distress  Neck-supple, non tender  Chest-clear breath sounds bilaterally  Cardiovascular-S1S2 regular  Abdomen-bowel sounds present, soft, nontender  Extremities- trace edema  Psych-normal mood to affect  Assessment and plan-  Chronic COPD-stable  CHF- stable  c/w lasix  HTN- stable  c/w lisinopril  A fib- c/w metoprolol, apixaban  HLD-c/w lipitor  OA- c/w tylenol prn  CKD- BUN 30 ,creatinine 1.4  Weakness-fall precautions  Repeat labs next week

## 2025-05-15 ENCOUNTER — APPOINTMENT (OUTPATIENT)
Dept: RADIOLOGY | Facility: HOSPITAL | Age: OVER 89
End: 2025-05-15
Payer: MEDICARE

## 2025-05-15 ENCOUNTER — APPOINTMENT (OUTPATIENT)
Dept: RADIOLOGY | Facility: HOSPITAL | Age: OVER 89
DRG: 291 | End: 2025-05-15
Payer: MEDICARE

## 2025-05-15 ENCOUNTER — CLINICAL SUPPORT (OUTPATIENT)
Dept: EMERGENCY MEDICINE | Facility: HOSPITAL | Age: OVER 89
End: 2025-05-15
Payer: MEDICARE

## 2025-05-15 ENCOUNTER — HOSPITAL ENCOUNTER (INPATIENT)
Facility: HOSPITAL | Age: OVER 89
Discharge: NURSING, REHAB OR LONG-TERM CARE | DRG: 291 | End: 2025-05-15
Attending: EMERGENCY MEDICINE | Admitting: STUDENT IN AN ORGANIZED HEALTH CARE EDUCATION/TRAINING PROGRAM
Payer: MEDICARE

## 2025-05-15 DIAGNOSIS — N30.00 ACUTE CYSTITIS WITHOUT HEMATURIA: ICD-10-CM

## 2025-05-15 DIAGNOSIS — R33.8 ACUTE URINARY RETENTION: ICD-10-CM

## 2025-05-15 DIAGNOSIS — R41.82 ALTERED MENTAL STATUS, UNSPECIFIED ALTERED MENTAL STATUS TYPE: Primary | ICD-10-CM

## 2025-05-15 DIAGNOSIS — I50.9 CONGESTIVE HEART FAILURE, UNSPECIFIED HF CHRONICITY, UNSPECIFIED HEART FAILURE TYPE: ICD-10-CM

## 2025-05-15 DIAGNOSIS — N17.9 AKI (ACUTE KIDNEY INJURY): ICD-10-CM

## 2025-05-15 LAB
ALBUMIN SERPL BCP-MCNC: 3.2 G/DL (ref 3.4–5)
ALP SERPL-CCNC: 81 U/L (ref 33–136)
ALT SERPL W P-5'-P-CCNC: 10 U/L (ref 7–45)
ANION GAP BLDV CALCULATED.4IONS-SCNC: 9 MMOL/L (ref 10–25)
ANION GAP SERPL CALC-SCNC: 10 MMOL/L (ref 10–20)
APPEARANCE UR: ABNORMAL
AST SERPL W P-5'-P-CCNC: 17 U/L (ref 9–39)
BACTERIA #/AREA URNS AUTO: ABNORMAL /HPF
BASE EXCESS BLDV CALC-SCNC: -3.8 MMOL/L (ref -2–3)
BASOPHILS # BLD AUTO: 0.05 X10*3/UL (ref 0–0.1)
BASOPHILS NFR BLD AUTO: 1.3 %
BILIRUB SERPL-MCNC: 0.8 MG/DL (ref 0–1.2)
BILIRUB UR STRIP.AUTO-MCNC: NEGATIVE MG/DL
BNP SERPL-MCNC: 261 PG/ML (ref 0–99)
BODY TEMPERATURE: 37 DEGREES CELSIUS
BUN SERPL-MCNC: 24 MG/DL (ref 6–23)
CA-I BLDV-SCNC: 1.13 MMOL/L (ref 1.1–1.33)
CALCIUM SERPL-MCNC: 8.1 MG/DL (ref 8.6–10.6)
CARDIAC TROPONIN I PNL SERPL HS: 11 NG/L (ref 0–34)
CARDIAC TROPONIN I PNL SERPL HS: 11 NG/L (ref 0–34)
CHLORIDE BLDV-SCNC: 114 MMOL/L (ref 98–107)
CHLORIDE SERPL-SCNC: 115 MMOL/L (ref 98–107)
CO2 SERPL-SCNC: 24 MMOL/L (ref 21–32)
COLOR UR: ABNORMAL
CREAT SERPL-MCNC: 0.99 MG/DL (ref 0.5–1.05)
EGFRCR SERPLBLD CKD-EPI 2021: 51 ML/MIN/1.73M*2
EOSINOPHIL # BLD AUTO: 0.11 X10*3/UL (ref 0–0.4)
EOSINOPHIL NFR BLD AUTO: 2.9 %
ERYTHROCYTE [DISTWIDTH] IN BLOOD BY AUTOMATED COUNT: 16 % (ref 11.5–14.5)
FLUAV RNA RESP QL NAA+PROBE: NOT DETECTED
FLUBV RNA RESP QL NAA+PROBE: NOT DETECTED
GLUCOSE BLDV-MCNC: 81 MG/DL (ref 74–99)
GLUCOSE SERPL-MCNC: 83 MG/DL (ref 74–99)
GLUCOSE UR STRIP.AUTO-MCNC: NORMAL MG/DL
HCO3 BLDV-SCNC: 22.2 MMOL/L (ref 22–26)
HCT VFR BLD AUTO: 33.5 % (ref 36–46)
HCT VFR BLD EST: 31 % (ref 36–46)
HGB BLD-MCNC: 10.3 G/DL (ref 12–16)
HGB BLDV-MCNC: 10.2 G/DL (ref 12–16)
IMM GRANULOCYTES # BLD AUTO: 0.02 X10*3/UL (ref 0–0.5)
IMM GRANULOCYTES NFR BLD AUTO: 0.5 % (ref 0–0.9)
INHALED O2 CONCENTRATION: 36 %
KETONES UR STRIP.AUTO-MCNC: NEGATIVE MG/DL
LACTATE BLDV-SCNC: 0.8 MMOL/L (ref 0.4–2)
LEUKOCYTE ESTERASE UR QL STRIP.AUTO: ABNORMAL
LIPASE SERPL-CCNC: 64 U/L (ref 9–82)
LYMPHOCYTES # BLD AUTO: 0.91 X10*3/UL (ref 0.8–3)
LYMPHOCYTES NFR BLD AUTO: 23.8 %
MCH RBC QN AUTO: 28.1 PG (ref 26–34)
MCHC RBC AUTO-ENTMCNC: 30.7 G/DL (ref 32–36)
MCV RBC AUTO: 92 FL (ref 80–100)
MONOCYTES # BLD AUTO: 0.42 X10*3/UL (ref 0.05–0.8)
MONOCYTES NFR BLD AUTO: 11 %
MUCOUS THREADS #/AREA URNS AUTO: ABNORMAL /LPF
NEUTROPHILS # BLD AUTO: 2.32 X10*3/UL (ref 1.6–5.5)
NEUTROPHILS NFR BLD AUTO: 60.5 %
NITRITE UR QL STRIP.AUTO: ABNORMAL
NRBC BLD-RTO: 0 /100 WBCS (ref 0–0)
OXYHGB MFR BLDV: 52.1 % (ref 45–75)
PCO2 BLDV: 43 MM HG (ref 41–51)
PH BLDV: 7.32 PH (ref 7.33–7.43)
PH UR STRIP.AUTO: 5 [PH]
PLATELET # BLD AUTO: 77 X10*3/UL (ref 150–450)
PO2 BLDV: 28 MM HG (ref 35–45)
POTASSIUM BLDV-SCNC: 3.5 MMOL/L (ref 3.5–5.3)
POTASSIUM SERPL-SCNC: 3.5 MMOL/L (ref 3.5–5.3)
PROT SERPL-MCNC: 5.6 G/DL (ref 6.4–8.2)
PROT UR STRIP.AUTO-MCNC: NEGATIVE MG/DL
Q ONSET: 223 MS
QRS COUNT: 10 BEATS
QRS DURATION: 90 MS
QT INTERVAL: 416 MS
QTC CALCULATION(BAZETT): 404 MS
QTC FREDERICIA: 409 MS
R AXIS: 37 DEGREES
RBC # BLD AUTO: 3.66 X10*6/UL (ref 4–5.2)
RBC # UR STRIP.AUTO: NEGATIVE MG/DL
RBC #/AREA URNS AUTO: ABNORMAL /HPF
SAO2 % BLDV: 53 % (ref 45–75)
SARS-COV-2 RNA RESP QL NAA+PROBE: NOT DETECTED
SODIUM BLDV-SCNC: 142 MMOL/L (ref 136–145)
SODIUM SERPL-SCNC: 145 MMOL/L (ref 136–145)
SP GR UR STRIP.AUTO: 1.02
SQUAMOUS #/AREA URNS AUTO: ABNORMAL /HPF
T AXIS: 98 DEGREES
T OFFSET: 431 MS
UROBILINOGEN UR STRIP.AUTO-MCNC: NORMAL MG/DL
VENTRICULAR RATE: 57 BPM
WBC # BLD AUTO: 3.8 X10*3/UL (ref 4.4–11.3)
WBC #/AREA URNS AUTO: ABNORMAL /HPF

## 2025-05-15 PROCEDURE — 2550000001 HC RX 255 CONTRASTS: Performed by: EMERGENCY MEDICINE

## 2025-05-15 PROCEDURE — 81001 URINALYSIS AUTO W/SCOPE: CPT

## 2025-05-15 PROCEDURE — 74177 CT ABD & PELVIS W/CONTRAST: CPT

## 2025-05-15 PROCEDURE — 87636 SARSCOV2 & INF A&B AMP PRB: CPT

## 2025-05-15 PROCEDURE — 74177 CT ABD & PELVIS W/CONTRAST: CPT | Performed by: SURGERY

## 2025-05-15 PROCEDURE — 36415 COLL VENOUS BLD VENIPUNCTURE: CPT

## 2025-05-15 PROCEDURE — 71046 X-RAY EXAM CHEST 2 VIEWS: CPT | Performed by: SURGERY

## 2025-05-15 PROCEDURE — 70450 CT HEAD/BRAIN W/O DYE: CPT

## 2025-05-15 PROCEDURE — 85025 COMPLETE CBC W/AUTO DIFF WBC: CPT

## 2025-05-15 PROCEDURE — 99285 EMERGENCY DEPT VISIT HI MDM: CPT | Performed by: EMERGENCY MEDICINE

## 2025-05-15 PROCEDURE — 73502 X-RAY EXAM HIP UNI 2-3 VIEWS: CPT | Mod: LT

## 2025-05-15 PROCEDURE — 93005 ELECTROCARDIOGRAM TRACING: CPT

## 2025-05-15 PROCEDURE — 93010 ELECTROCARDIOGRAM REPORT: CPT | Performed by: EMERGENCY MEDICINE

## 2025-05-15 PROCEDURE — 96365 THER/PROPH/DIAG IV INF INIT: CPT

## 2025-05-15 PROCEDURE — 84132 ASSAY OF SERUM POTASSIUM: CPT

## 2025-05-15 PROCEDURE — 87077 CULTURE AEROBIC IDENTIFY: CPT

## 2025-05-15 PROCEDURE — 71260 CT THORAX DX C+: CPT | Performed by: SURGERY

## 2025-05-15 PROCEDURE — 82330 ASSAY OF CALCIUM: CPT

## 2025-05-15 PROCEDURE — 70450 CT HEAD/BRAIN W/O DYE: CPT | Performed by: SURGERY

## 2025-05-15 PROCEDURE — 84484 ASSAY OF TROPONIN QUANT: CPT

## 2025-05-15 PROCEDURE — 2500000004 HC RX 250 GENERAL PHARMACY W/ HCPCS (ALT 636 FOR OP/ED): Mod: JZ

## 2025-05-15 PROCEDURE — 71046 X-RAY EXAM CHEST 2 VIEWS: CPT

## 2025-05-15 PROCEDURE — 99285 EMERGENCY DEPT VISIT HI MDM: CPT | Mod: 25 | Performed by: EMERGENCY MEDICINE

## 2025-05-15 PROCEDURE — 83880 ASSAY OF NATRIURETIC PEPTIDE: CPT

## 2025-05-15 PROCEDURE — 73502 X-RAY EXAM HIP UNI 2-3 VIEWS: CPT | Mod: LEFT SIDE | Performed by: SURGERY

## 2025-05-15 PROCEDURE — 83690 ASSAY OF LIPASE: CPT

## 2025-05-15 RX ORDER — CEFTRIAXONE 2 G/50ML
2 INJECTION, SOLUTION INTRAVENOUS ONCE
Status: COMPLETED | OUTPATIENT
Start: 2025-05-15 | End: 2025-05-15

## 2025-05-15 RX ORDER — FUROSEMIDE 10 MG/ML
20 INJECTION INTRAMUSCULAR; INTRAVENOUS ONCE
Status: DISCONTINUED | OUTPATIENT
Start: 2025-05-15 | End: 2025-05-16

## 2025-05-15 RX ORDER — LISINOPRIL 5 MG/1
10 TABLET ORAL ONCE
Status: DISCONTINUED | OUTPATIENT
Start: 2025-05-15 | End: 2025-05-15

## 2025-05-15 RX ADMIN — CEFTRIAXONE SODIUM 2 G: 2 INJECTION, SOLUTION INTRAVENOUS at 23:40

## 2025-05-15 RX ADMIN — IOHEXOL 100 ML: 350 INJECTION, SOLUTION INTRAVENOUS at 23:03

## 2025-05-15 ASSESSMENT — COLUMBIA-SUICIDE SEVERITY RATING SCALE - C-SSRS
1. IN THE PAST MONTH, HAVE YOU WISHED YOU WERE DEAD OR WISHED YOU COULD GO TO SLEEP AND NOT WAKE UP?: NO
6. HAVE YOU EVER DONE ANYTHING, STARTED TO DO ANYTHING, OR PREPARED TO DO ANYTHING TO END YOUR LIFE?: NO
2. HAVE YOU ACTUALLY HAD ANY THOUGHTS OF KILLING YOURSELF?: NO

## 2025-05-15 ASSESSMENT — PAIN - FUNCTIONAL ASSESSMENT: PAIN_FUNCTIONAL_ASSESSMENT: WONG-BAKER FACES

## 2025-05-15 ASSESSMENT — PAIN SCALES - WONG BAKER: WONGBAKER_NUMERICALRESPONSE: NO HURT

## 2025-05-15 NOTE — ED PROVIDER NOTES
Emergency Department Provider Note        History of Present Illness     History provided by: Patient  Limitations to History: None  External Records Reviewed with Brief Summary: [facility] papers  Prior dc 2019 pulm htn    HPI:  Susan Patiño is a 89 y.o. female with past medical history of hypertension, CKD, pulmonary HTN, MI, A-fib on apixaban, heart failure, TIA/CVA, dementia, MI presents to the emergency department for concern for aphasia and hypertension at [facility].  Patient denying any pain at this time however history is limited given dementia, hearing loss.  EMS reports glucose of 118.  At baseline oxygen 2 L O2. LKW at 11 am. They report she was slower to respond, no aphasia, no reported falls or witness falls. Nurse reports did take meds this morning.     Physical Exam   Triage vitals:  T 37 °C (98.6 °F)  HR 54  BP (!) 192/100  RR 16  O2 99 % None (Room air)    General: Awake, alert, in no acute distress, oriented to person, place but not year  Eyes: Gaze conjugate.  No scleral icterus or injection  HENT: Normo-cephalic, atraumatic. No stridor  CV: Irregular rate, irregular rhythm. Radial pulses 2+ bilaterally  Resp: Breathing non-labored, speaking in full sentences.  Clear to auscultation bilaterally  GI: Soft, non-distended, non-tender. No rebound or guarding.  MSK/Extremities: No gross bony deformities. Moving all extremities, left hip tender on palpation, LE edema, dopplerable DP pulses in LEs  Skin: Warm. Appropriate color  Neurologic: Patient is awake, hard of hearing. Speech is clear. Face is symmetric without facial droop and facial sensation to light touch equal bilaterally. Uvula midline. Tongue protrusion midline. Hearing loss at baseline. Full and equal shoulder shrug. 5/5 motor strength of UEs and LEs. Sensation to light touch intact in all four extremities. Finger to nose intact. No pronator drift.  Psych: Appropriate mood and affect    Medical Decision Making & ED  Course   Medical Decision Makin y.o. female for hypertension, aphasia.  Patient is currently Van negative.  Patient is moving all extremities, GCS 14 given confusion. At this time, less concerning for TIA.  Neuroexam unremarkable.  NIH 0.  Last known well at 11 AM, outside window for emergent stroke workup.  EKG to assess for any arrhythmia.  Given patient had aphasia, hypertension, possible AMS, CT head ordered to assess for any SAH, ICH.  X-ray chest, pelvis, left hip ordered given left hip pain, and to assess for any pneumothorax, pleural effusion, pneumonia, fracture.  ACS workup pursued to assess for any AMS, worsening pulmonary hypertension, CHF exacerbation.  UA to assess for any UTI.  EKG shows known history of A-fib. CXR showing pleural effusion. BNP high unknown baseline, had prior adm at 3000. CXR shows pleural effusion. CT CAP ordered to assess for pneumonia, acute traumatic injury. Pending CTAP results.    ----  Differential diagnoses considered include but are not limited to: TIA, mass, SAH/ICH, fracture, AMS, CHF exacerbation, syncope, electrolyte derangements, hypoglycemia, MI, UTI, pneumonia     Social Determinants of Health which Significantly Impact Care: None identified     EKG Independent Interpretation: EKG interpreted by myself. Please see ED Course for full interpretation.    Independent Result Review and Interpretation: Relevant laboratory and radiographic results were reviewed and independently interpreted by myself.  As necessary, they are commented on in the ED Course.    Chronic conditions affecting the patient's care: As documented above in [facility]    The patient was discussed with the following consultants/services: None    Care Considerations: As documented above in [facility]    ED Course:  ED Course as of 05/15/25 2204   Thu May 15, 2025   4419 I independently interpreted the EKG:  A fib. Normal axis.   Normal QRS, and Qtc intervals.   No ST segment elevations, or T wave inversions. [AT]    1844 Did call [facility], granddaughter reports not acting like self. They report granddaughter called manager and wanted manager to assess and said she was slow to respond, seemed different. Doctor reports slow to respond and confused and didn't make sense. Denies sensory motor deficits or facial droop. Last known well at 11 AM then care conference meeting after where she was after which was abnormal.   [AT]   2003 I independently interpreted the CXR without evidence of pneumothorax, hemothorax, consolidation/pneumonia, pleural effusion, or widened mediastinum.  [AT]   2028 Cth:  1. No intracranial hemorrhage, acute infarct, or displaced calvarial  fracture.  2. Encephalomalacia within the left occipital lobe and left  cerebellum, likely sequela of remote infarcts.  3. Moderate small-vessel ischemic disease.  4. Moderate cerebral volume loss.   [AT]   2028 CXR:  New interstitial prominence with peribronchial cuffing and Kerley  B-lines compatible with acute pulmonary interstitial edema/CHF.  Bibasilar airspace opacities, probably atelectasis. Small pleural  effusions. No pneumothorax.     [AT]   2028 XR:  No acute fracture or malalignment of the pelvis and left hip. [AT]   2142 Troponin I, High Sensitivity (CMC): 11  stable [AT]   2143 Cth:  No evidence of acute intracranial abnormality. Chronic ischemic  changes.      Left mastoid and middle ear effusions; correlate clinically with any  concern for acute otomastoiditis.   [AT]   2145 No pain on mastoid, less likely mastoiditis [AT]      ED Course User Index  [AT] Rita Alvarado MD         Diagnoses as of 05/15/25 2204   Altered mental status, unspecified altered mental status type     Disposition   Patient was signed out to Dr. Worthington at 2300 pending completion of their work-up.  Please see the next provider's transition of care note for the remainder of the patient's care.     Procedures   Procedures    Patient seen and discussed with ED attending  physician.    Rita Alvarado MD  Emergency Medicine     Rita Alvarado MD  Resident  05/15/25 2646

## 2025-05-15 NOTE — ED TRIAGE NOTES
Patient is from [facility] - baseline has dementia. Family came to visit and stated that the patient is more altered than normal. Patient is very very [place]. Patient is slow to respond.

## 2025-05-16 ENCOUNTER — APPOINTMENT (OUTPATIENT)
Dept: RADIOLOGY | Facility: HOSPITAL | Age: OVER 89
End: 2025-05-16
Payer: MEDICARE

## 2025-05-16 ENCOUNTER — APPOINTMENT (OUTPATIENT)
Dept: RADIOLOGY | Facility: HOSPITAL | Age: OVER 89
DRG: 291 | End: 2025-05-16
Payer: MEDICARE

## 2025-05-16 PROBLEM — R41.82 ALTERED MENTAL STATUS, UNSPECIFIED ALTERED MENTAL STATUS TYPE: Status: ACTIVE | Noted: 2025-05-16

## 2025-05-16 LAB
ABO GROUP (TYPE) IN BLOOD: NORMAL
ANTIBODY SCREEN: NORMAL
APTT PPP: 43 SECONDS (ref 26–36)
FIBRINOGEN PPP-MCNC: 247 MG/DL (ref 200–400)
GLUCOSE BLD MANUAL STRIP-MCNC: 89 MG/DL (ref 74–99)
HOLD SPECIMEN: NORMAL
RH FACTOR (ANTIGEN D): NORMAL
UFH PPP CHRO-ACNC: 1.6 IU/ML (ref ?–1.1)

## 2025-05-16 PROCEDURE — 73060 X-RAY EXAM OF HUMERUS: CPT | Mod: RIGHT SIDE | Performed by: SURGERY

## 2025-05-16 PROCEDURE — 96365 THER/PROPH/DIAG IV INF INIT: CPT | Mod: 59

## 2025-05-16 PROCEDURE — 85730 THROMBOPLASTIN TIME PARTIAL: CPT | Performed by: EMERGENCY MEDICINE

## 2025-05-16 PROCEDURE — 2500000004 HC RX 250 GENERAL PHARMACY W/ HCPCS (ALT 636 FOR OP/ED): Mod: JZ

## 2025-05-16 PROCEDURE — 70498 CT ANGIOGRAPHY NECK: CPT | Performed by: SURGERY

## 2025-05-16 PROCEDURE — 36415 COLL VENOUS BLD VENIPUNCTURE: CPT | Performed by: EMERGENCY MEDICINE

## 2025-05-16 PROCEDURE — 2500000004 HC RX 250 GENERAL PHARMACY W/ HCPCS (ALT 636 FOR OP/ED): Mod: JZ | Performed by: STUDENT IN AN ORGANIZED HEALTH CARE EDUCATION/TRAINING PROGRAM

## 2025-05-16 PROCEDURE — 82947 ASSAY GLUCOSE BLOOD QUANT: CPT

## 2025-05-16 PROCEDURE — 85520 HEPARIN ASSAY: CPT | Performed by: EMERGENCY MEDICINE

## 2025-05-16 PROCEDURE — 96372 THER/PROPH/DIAG INJ SC/IM: CPT | Performed by: EMERGENCY MEDICINE

## 2025-05-16 PROCEDURE — 2500000001 HC RX 250 WO HCPCS SELF ADMINISTERED DRUGS (ALT 637 FOR MEDICARE OP): Performed by: STUDENT IN AN ORGANIZED HEALTH CARE EDUCATION/TRAINING PROGRAM

## 2025-05-16 PROCEDURE — 99223 1ST HOSP IP/OBS HIGH 75: CPT | Performed by: STUDENT IN AN ORGANIZED HEALTH CARE EDUCATION/TRAINING PROGRAM

## 2025-05-16 PROCEDURE — 2500000001 HC RX 250 WO HCPCS SELF ADMINISTERED DRUGS (ALT 637 FOR MEDICARE OP): Performed by: INTERNAL MEDICINE

## 2025-05-16 PROCEDURE — 2500000004 HC RX 250 GENERAL PHARMACY W/ HCPCS (ALT 636 FOR OP/ED): Mod: JZ | Performed by: EMERGENCY MEDICINE

## 2025-05-16 PROCEDURE — 96366 THER/PROPH/DIAG IV INF ADDON: CPT

## 2025-05-16 PROCEDURE — 96375 TX/PRO/DX INJ NEW DRUG ADDON: CPT

## 2025-05-16 PROCEDURE — 70498 CT ANGIOGRAPHY NECK: CPT

## 2025-05-16 PROCEDURE — 96367 TX/PROPH/DG ADDL SEQ IV INF: CPT

## 2025-05-16 PROCEDURE — 1200000002 HC GENERAL ROOM WITH TELEMETRY DAILY

## 2025-05-16 PROCEDURE — 2500000004 HC RX 250 GENERAL PHARMACY W/ HCPCS (ALT 636 FOR OP/ED): Mod: JZ | Performed by: INTERNAL MEDICINE

## 2025-05-16 PROCEDURE — 2500000005 HC RX 250 GENERAL PHARMACY W/O HCPCS: Performed by: STUDENT IN AN ORGANIZED HEALTH CARE EDUCATION/TRAINING PROGRAM

## 2025-05-16 PROCEDURE — 73060 X-RAY EXAM OF HUMERUS: CPT | Mod: RT

## 2025-05-16 PROCEDURE — 2550000001 HC RX 255 CONTRASTS: Performed by: EMERGENCY MEDICINE

## 2025-05-16 PROCEDURE — 86901 BLOOD TYPING SEROLOGIC RH(D): CPT | Performed by: EMERGENCY MEDICINE

## 2025-05-16 PROCEDURE — 85384 FIBRINOGEN ACTIVITY: CPT | Performed by: EMERGENCY MEDICINE

## 2025-05-16 RX ORDER — AMOXICILLIN 250 MG
2 CAPSULE ORAL 2 TIMES DAILY
Status: DISCONTINUED | OUTPATIENT
Start: 2025-05-16 | End: 2025-05-27 | Stop reason: HOSPADM

## 2025-05-16 RX ORDER — DORZOLAMIDE HYDROCHLORIDE AND TIMOLOL MALEATE 20; 5 MG/ML; MG/ML
1 SOLUTION/ DROPS OPHTHALMIC 2 TIMES DAILY
Status: DISCONTINUED | OUTPATIENT
Start: 2025-05-16 | End: 2025-05-27 | Stop reason: HOSPADM

## 2025-05-16 RX ORDER — METOPROLOL SUCCINATE 100 MG/1
100 TABLET, EXTENDED RELEASE ORAL DAILY
Status: DISCONTINUED | OUTPATIENT
Start: 2025-05-16 | End: 2025-05-27 | Stop reason: HOSPADM

## 2025-05-16 RX ORDER — ESMOLOL HYDROCHLORIDE 10 MG/ML
50-300 INJECTION, SOLUTION INTRAVENOUS CONTINUOUS
Status: DISCONTINUED | OUTPATIENT
Start: 2025-05-16 | End: 2025-05-16 | Stop reason: ALTCHOICE

## 2025-05-16 RX ORDER — IPRATROPIUM BROMIDE AND ALBUTEROL SULFATE 2.5; .5 MG/3ML; MG/3ML
3 SOLUTION RESPIRATORY (INHALATION) EVERY 6 HOURS PRN
Status: DISCONTINUED | OUTPATIENT
Start: 2025-05-16 | End: 2025-05-27 | Stop reason: HOSPADM

## 2025-05-16 RX ORDER — LISINOPRIL 20 MG/1
10 TABLET ORAL DAILY
Status: DISCONTINUED | OUTPATIENT
Start: 2025-05-16 | End: 2025-05-27 | Stop reason: HOSPADM

## 2025-05-16 RX ORDER — DICLOFENAC SODIUM 10 MG/G
4 GEL TOPICAL 4 TIMES DAILY PRN
Status: DISCONTINUED | OUTPATIENT
Start: 2025-05-16 | End: 2025-05-27 | Stop reason: HOSPADM

## 2025-05-16 RX ORDER — CHOLECALCIFEROL (VITAMIN D3) 25 MCG
50 TABLET ORAL DAILY
Status: DISCONTINUED | OUTPATIENT
Start: 2025-05-16 | End: 2025-05-27 | Stop reason: HOSPADM

## 2025-05-16 RX ORDER — OLANZAPINE 10 MG/2ML
2.5 INJECTION, POWDER, FOR SOLUTION INTRAMUSCULAR EVERY 6 HOURS PRN
Status: DISCONTINUED | OUTPATIENT
Start: 2025-05-16 | End: 2025-05-27

## 2025-05-16 RX ORDER — LABETALOL HYDROCHLORIDE 5 MG/ML
10 INJECTION, SOLUTION INTRAVENOUS EVERY 6 HOURS PRN
Status: DISCONTINUED | OUTPATIENT
Start: 2025-05-16 | End: 2025-05-27 | Stop reason: HOSPADM

## 2025-05-16 RX ORDER — ESMOLOL HYDROCHLORIDE 10 MG/ML
50-300 INJECTION, SOLUTION INTRAVENOUS CONTINUOUS
Status: DISCONTINUED | OUTPATIENT
Start: 2025-05-16 | End: 2025-05-16 | Stop reason: DRUGHIGH

## 2025-05-16 RX ORDER — ATORVASTATIN CALCIUM 40 MG/1
40 TABLET, FILM COATED ORAL NIGHTLY
Status: DISCONTINUED | OUTPATIENT
Start: 2025-05-16 | End: 2025-05-27 | Stop reason: HOSPADM

## 2025-05-16 RX ORDER — ONDANSETRON 4 MG/1
4 TABLET, FILM COATED ORAL EVERY 8 HOURS PRN
Status: DISCONTINUED | OUTPATIENT
Start: 2025-05-16 | End: 2025-05-27 | Stop reason: HOSPADM

## 2025-05-16 RX ORDER — SERTRALINE HYDROCHLORIDE 50 MG/1
25 TABLET, FILM COATED ORAL DAILY
Status: DISCONTINUED | OUTPATIENT
Start: 2025-05-16 | End: 2025-05-27 | Stop reason: HOSPADM

## 2025-05-16 RX ORDER — OLANZAPINE 5 MG/1
2.5 TABLET, ORALLY DISINTEGRATING ORAL EVERY 6 HOURS PRN
Status: DISCONTINUED | OUTPATIENT
Start: 2025-05-16 | End: 2025-05-27

## 2025-05-16 RX ORDER — FUROSEMIDE 10 MG/ML
40 INJECTION INTRAMUSCULAR; INTRAVENOUS ONCE
Status: COMPLETED | OUTPATIENT
Start: 2025-05-16 | End: 2025-05-16

## 2025-05-16 RX ORDER — FUROSEMIDE 10 MG/ML
20 INJECTION INTRAMUSCULAR; INTRAVENOUS ONCE
Status: COMPLETED | OUTPATIENT
Start: 2025-05-16 | End: 2025-05-16

## 2025-05-16 RX ORDER — OLANZAPINE 10 MG/2ML
2.5 INJECTION, POWDER, FOR SOLUTION INTRAMUSCULAR ONCE
Status: COMPLETED | OUTPATIENT
Start: 2025-05-16 | End: 2025-05-16

## 2025-05-16 RX ORDER — ESMOLOL HYDROCHLORIDE 10 MG/ML
50-300 INJECTION, SOLUTION INTRAVENOUS CONTINUOUS
Status: DISCONTINUED | OUTPATIENT
Start: 2025-05-16 | End: 2025-05-16

## 2025-05-16 RX ORDER — ESMOLOL HYDROCHLORIDE 10 MG/ML
INJECTION, SOLUTION INTRAVENOUS
Status: COMPLETED
Start: 2025-05-16 | End: 2025-05-16

## 2025-05-16 RX ORDER — NICARDIPINE HYDROCHLORIDE 0.2 MG/ML
0-2.5 INJECTION INTRAVENOUS CONTINUOUS
Status: DISCONTINUED | OUTPATIENT
Start: 2025-05-16 | End: 2025-05-16

## 2025-05-16 RX ORDER — MIRTAZAPINE 15 MG/1
7.5 TABLET, FILM COATED ORAL NIGHTLY
Status: DISCONTINUED | OUTPATIENT
Start: 2025-05-16 | End: 2025-05-27 | Stop reason: HOSPADM

## 2025-05-16 RX ORDER — NICARDIPINE HYDROCHLORIDE 0.2 MG/ML
0-15 INJECTION INTRAVENOUS CONTINUOUS
Status: DISCONTINUED | OUTPATIENT
Start: 2025-05-16 | End: 2025-05-16

## 2025-05-16 RX ORDER — LATANOPROST 50 UG/ML
1 SOLUTION/ DROPS OPHTHALMIC NIGHTLY
Status: DISCONTINUED | OUTPATIENT
Start: 2025-05-16 | End: 2025-05-27 | Stop reason: HOSPADM

## 2025-05-16 RX ORDER — DICLOFENAC SODIUM 10 MG/G
2 GEL TOPICAL 2 TIMES DAILY
COMMUNITY

## 2025-05-16 RX ORDER — IPRATROPIUM BROMIDE AND ALBUTEROL SULFATE 2.5; .5 MG/3ML; MG/3ML
3 SOLUTION RESPIRATORY (INHALATION) EVERY 8 HOURS PRN
COMMUNITY

## 2025-05-16 RX ORDER — ACETAMINOPHEN 325 MG/1
650 TABLET ORAL EVERY 4 HOURS PRN
Status: DISCONTINUED | OUTPATIENT
Start: 2025-05-16 | End: 2025-05-27 | Stop reason: HOSPADM

## 2025-05-16 RX ORDER — CEFTRIAXONE 2 G/50ML
2 INJECTION, SOLUTION INTRAVENOUS EVERY 24 HOURS
Status: DISCONTINUED | OUTPATIENT
Start: 2025-05-16 | End: 2025-05-22

## 2025-05-16 RX ORDER — ACETAMINOPHEN 160 MG/5ML
650 SOLUTION ORAL EVERY 4 HOURS PRN
Status: DISCONTINUED | OUTPATIENT
Start: 2025-05-16 | End: 2025-05-27 | Stop reason: HOSPADM

## 2025-05-16 RX ORDER — BRIMONIDINE TARTRATE 2 MG/ML
1 SOLUTION/ DROPS OPHTHALMIC 2 TIMES DAILY
Status: DISCONTINUED | OUTPATIENT
Start: 2025-05-16 | End: 2025-05-27 | Stop reason: HOSPADM

## 2025-05-16 RX ORDER — ONDANSETRON HYDROCHLORIDE 2 MG/ML
4 INJECTION, SOLUTION INTRAVENOUS EVERY 8 HOURS PRN
Status: DISCONTINUED | OUTPATIENT
Start: 2025-05-16 | End: 2025-05-27 | Stop reason: HOSPADM

## 2025-05-16 RX ORDER — ACETAMINOPHEN 650 MG/1
650 SUPPOSITORY RECTAL EVERY 4 HOURS PRN
Status: DISCONTINUED | OUTPATIENT
Start: 2025-05-16 | End: 2025-05-27 | Stop reason: HOSPADM

## 2025-05-16 RX ORDER — NICARDIPINE HYDROCHLORIDE 0.2 MG/ML
INJECTION INTRAVENOUS
Status: COMPLETED
Start: 2025-05-16 | End: 2025-05-16

## 2025-05-16 RX ADMIN — DICLOFENAC SODIUM 4 G: 10 GEL TOPICAL at 13:10

## 2025-05-16 RX ADMIN — METOPROLOL SUCCINATE 100 MG: 100 TABLET, EXTENDED RELEASE ORAL at 08:44

## 2025-05-16 RX ADMIN — FUROSEMIDE 40 MG: 10 INJECTION INTRAMUSCULAR; INTRAVENOUS at 00:47

## 2025-05-16 RX ADMIN — SALINE NASAL SPRAY 1 SPRAY: 1.5 SOLUTION NASAL at 20:28

## 2025-05-16 RX ADMIN — ESMOLOL HYDROCHLORIDE 50 MCG/KG/MIN: 10 INJECTION, SOLUTION INTRAVENOUS at 01:04

## 2025-05-16 RX ADMIN — IOHEXOL 75 ML: 350 INJECTION, SOLUTION INTRAVENOUS at 00:26

## 2025-05-16 RX ADMIN — FUROSEMIDE 20 MG: 10 INJECTION, SOLUTION INTRAVENOUS at 16:56

## 2025-05-16 RX ADMIN — ATORVASTATIN CALCIUM 40 MG: 40 TABLET, FILM COATED ORAL at 20:28

## 2025-05-16 RX ADMIN — SENNOSIDES AND DOCUSATE SODIUM 2 TABLET: 50; 8.6 TABLET ORAL at 20:28

## 2025-05-16 RX ADMIN — APIXABAN 2.5 MG: 2.5 TABLET, FILM COATED ORAL at 20:28

## 2025-05-16 RX ADMIN — BRIMONIDINE TARTRATE 1 DROP: 2 SOLUTION/ DROPS OPHTHALMIC at 20:28

## 2025-05-16 RX ADMIN — MIRTAZAPINE 7.5 MG: 15 TABLET, FILM COATED ORAL at 20:28

## 2025-05-16 RX ADMIN — OLANZAPINE 2.5 MG: 10 INJECTION, POWDER, LYOPHILIZED, FOR SOLUTION INTRAMUSCULAR at 04:54

## 2025-05-16 RX ADMIN — CEFTRIAXONE SODIUM 2 G: 2 INJECTION, SOLUTION INTRAVENOUS at 21:19

## 2025-05-16 RX ADMIN — NICARDIPINE HYDROCHLORIDE 2.5 MG/HR: 0.2 INJECTION, SOLUTION INTRAVENOUS at 00:41

## 2025-05-16 RX ADMIN — NICARDIPINE HYDROCHLORIDE 2.5 MG/HR: 0.2 INJECTION INTRAVENOUS at 00:41

## 2025-05-16 RX ADMIN — APIXABAN 2.5 MG: 2.5 TABLET, FILM COATED ORAL at 08:44

## 2025-05-16 RX ADMIN — NICARDIPINE HYDROCHLORIDE 5 MG/HR: 0.2 INJECTION, SOLUTION INTRAVENOUS at 00:48

## 2025-05-16 RX ADMIN — LISINOPRIL 10 MG: 20 TABLET ORAL at 08:45

## 2025-05-16 RX ADMIN — ESMOLOL HYDROCHLORIDE 50 MCG/KG/MIN: 10 INJECTION, SOLUTION INTRAVENOUS at 01:05

## 2025-05-16 SDOH — SOCIAL STABILITY: SOCIAL NETWORK: HOW OFTEN DO YOU ATTEND CHURCH OR RELIGIOUS SERVICES?: NEVER

## 2025-05-16 SDOH — ECONOMIC STABILITY: HOUSING INSECURITY: IN THE PAST 12 MONTHS, HOW MANY TIMES HAVE YOU MOVED WHERE YOU WERE LIVING?: 0

## 2025-05-16 SDOH — SOCIAL STABILITY: SOCIAL INSECURITY: ARE YOU OR HAVE YOU BEEN THREATENED OR ABUSED PHYSICALLY, EMOTIONALLY, OR SEXUALLY BY ANYONE?: UNABLE TO ASSESS

## 2025-05-16 SDOH — HEALTH STABILITY: MENTAL HEALTH: HOW OFTEN DO YOU HAVE A DRINK CONTAINING ALCOHOL?: NEVER

## 2025-05-16 SDOH — ECONOMIC STABILITY: FOOD INSECURITY: HOW HARD IS IT FOR YOU TO PAY FOR THE VERY BASICS LIKE FOOD, HOUSING, MEDICAL CARE, AND HEATING?: NOT VERY HARD

## 2025-05-16 SDOH — SOCIAL STABILITY: SOCIAL NETWORK: IN A TYPICAL WEEK, HOW MANY TIMES DO YOU TALK ON THE PHONE WITH FAMILY, FRIENDS, OR NEIGHBORS?: PATIENT UNABLE TO ANSWER

## 2025-05-16 SDOH — ECONOMIC STABILITY: HOUSING INSECURITY: AT ANY TIME IN THE PAST 12 MONTHS, WERE YOU HOMELESS OR LIVING IN A SHELTER (INCLUDING NOW)?: NO

## 2025-05-16 SDOH — HEALTH STABILITY: MENTAL HEALTH
DO YOU FEEL STRESS - TENSE, RESTLESS, NERVOUS, OR ANXIOUS, OR UNABLE TO SLEEP AT NIGHT BECAUSE YOUR MIND IS TROUBLED ALL THE TIME - THESE DAYS?: NOT AT ALL

## 2025-05-16 SDOH — SOCIAL STABILITY: SOCIAL INSECURITY
WITHIN THE LAST YEAR, HAVE YOU BEEN RAPED OR FORCED TO HAVE ANY KIND OF SEXUAL ACTIVITY BY YOUR PARTNER OR EX-PARTNER?: PATIENT UNABLE TO ANSWER

## 2025-05-16 SDOH — ECONOMIC STABILITY: FOOD INSECURITY: WITHIN THE PAST 12 MONTHS, YOU WORRIED THAT YOUR FOOD WOULD RUN OUT BEFORE YOU GOT THE MONEY TO BUY MORE.: NEVER TRUE

## 2025-05-16 SDOH — HEALTH STABILITY: PHYSICAL HEALTH
ON AVERAGE, HOW MANY DAYS PER WEEK DO YOU ENGAGE IN MODERATE TO STRENUOUS EXERCISE (LIKE A BRISK WALK)?: PATIENT UNABLE TO ANSWER

## 2025-05-16 SDOH — SOCIAL STABILITY: SOCIAL INSECURITY: HAVE YOU HAD ANY THOUGHTS OF HARMING ANYONE ELSE?: UNABLE TO ASSESS

## 2025-05-16 SDOH — HEALTH STABILITY: PHYSICAL HEALTH
HOW OFTEN DO YOU NEED TO HAVE SOMEONE HELP YOU WHEN YOU READ INSTRUCTIONS, PAMPHLETS, OR OTHER WRITTEN MATERIAL FROM YOUR DOCTOR OR PHARMACY?: ALWAYS

## 2025-05-16 SDOH — HEALTH STABILITY: MENTAL HEALTH: HOW MANY DRINKS CONTAINING ALCOHOL DO YOU HAVE ON A TYPICAL DAY WHEN YOU ARE DRINKING?: PATIENT DOES NOT DRINK

## 2025-05-16 SDOH — SOCIAL STABILITY: SOCIAL INSECURITY
WITHIN THE LAST YEAR, HAVE YOU BEEN KICKED, HIT, SLAPPED, OR OTHERWISE PHYSICALLY HURT BY YOUR PARTNER OR EX-PARTNER?: PATIENT UNABLE TO ANSWER

## 2025-05-16 SDOH — HEALTH STABILITY: MENTAL HEALTH: HOW OFTEN DO YOU HAVE SIX OR MORE DRINKS ON ONE OCCASION?: NEVER

## 2025-05-16 SDOH — HEALTH STABILITY: PHYSICAL HEALTH: ON AVERAGE, HOW MANY MINUTES DO YOU ENGAGE IN EXERCISE AT THIS LEVEL?: PATIENT UNABLE TO ANSWER

## 2025-05-16 SDOH — SOCIAL STABILITY: SOCIAL NETWORK
DO YOU BELONG TO ANY CLUBS OR ORGANIZATIONS SUCH AS CHURCH GROUPS, UNIONS, FRATERNAL OR ATHLETIC GROUPS, OR SCHOOL GROUPS?: NO

## 2025-05-16 SDOH — ECONOMIC STABILITY: HOUSING INSECURITY: IN THE LAST 12 MONTHS, WAS THERE A TIME WHEN YOU WERE NOT ABLE TO PAY THE MORTGAGE OR RENT ON TIME?: NO

## 2025-05-16 SDOH — SOCIAL STABILITY: SOCIAL NETWORK: HOW OFTEN DO YOU ATTEND MEETINGS OF THE CLUBS OR ORGANIZATIONS YOU BELONG TO?: NEVER

## 2025-05-16 SDOH — SOCIAL STABILITY: SOCIAL NETWORK: HOW OFTEN DO YOU GET TOGETHER WITH FRIENDS OR RELATIVES?: PATIENT UNABLE TO ANSWER

## 2025-05-16 SDOH — SOCIAL STABILITY: SOCIAL INSECURITY: WITHIN THE LAST YEAR, HAVE YOU BEEN AFRAID OF YOUR PARTNER OR EX-PARTNER?: PATIENT UNABLE TO ANSWER

## 2025-05-16 SDOH — SOCIAL STABILITY: SOCIAL INSECURITY
WITHIN THE LAST YEAR, HAVE YOU BEEN HUMILIATED OR EMOTIONALLY ABUSED IN OTHER WAYS BY YOUR PARTNER OR EX-PARTNER?: PATIENT UNABLE TO ANSWER

## 2025-05-16 SDOH — SOCIAL STABILITY: SOCIAL INSECURITY: DO YOU FEEL UNSAFE GOING BACK TO THE PLACE WHERE YOU ARE LIVING?: UNABLE TO ASSESS

## 2025-05-16 SDOH — ECONOMIC STABILITY: INCOME INSECURITY: IN THE PAST 12 MONTHS HAS THE ELECTRIC, GAS, OIL, OR WATER COMPANY THREATENED TO SHUT OFF SERVICES IN YOUR HOME?: NO

## 2025-05-16 SDOH — ECONOMIC STABILITY: FOOD INSECURITY: WITHIN THE PAST 12 MONTHS, THE FOOD YOU BOUGHT JUST DIDN'T LAST AND YOU DIDN'T HAVE MONEY TO GET MORE.: NEVER TRUE

## 2025-05-16 SDOH — SOCIAL STABILITY: SOCIAL INSECURITY: ARE YOU MARRIED, WIDOWED, DIVORCED, SEPARATED, NEVER MARRIED, OR LIVING WITH A PARTNER?: WIDOWED

## 2025-05-16 SDOH — SOCIAL STABILITY: SOCIAL INSECURITY: ARE THERE ANY APPARENT SIGNS OF INJURIES/BEHAVIORS THAT COULD BE RELATED TO ABUSE/NEGLECT?: UNABLE TO ASSESS

## 2025-05-16 SDOH — SOCIAL STABILITY: SOCIAL INSECURITY: DO YOU FEEL ANYONE HAS EXPLOITED OR TAKEN ADVANTAGE OF YOU FINANCIALLY OR OF YOUR PERSONAL PROPERTY?: UNABLE TO ASSESS

## 2025-05-16 SDOH — SOCIAL STABILITY: SOCIAL INSECURITY: HAVE YOU HAD THOUGHTS OF HARMING ANYONE ELSE?: UNABLE TO ASSESS

## 2025-05-16 SDOH — SOCIAL STABILITY: SOCIAL INSECURITY: DOES ANYONE TRY TO KEEP YOU FROM HAVING/CONTACTING OTHER FRIENDS OR DOING THINGS OUTSIDE YOUR HOME?: UNABLE TO ASSESS

## 2025-05-16 SDOH — SOCIAL STABILITY: SOCIAL INSECURITY: ABUSE: ADULT

## 2025-05-16 SDOH — ECONOMIC STABILITY: TRANSPORTATION INSECURITY: IN THE PAST 12 MONTHS, HAS LACK OF TRANSPORTATION KEPT YOU FROM MEDICAL APPOINTMENTS OR FROM GETTING MEDICATIONS?: NO

## 2025-05-16 SDOH — SOCIAL STABILITY: SOCIAL INSECURITY: HAS ANYONE EVER THREATENED TO HURT YOUR FAMILY OR YOUR PETS?: UNABLE TO ASSESS

## 2025-05-16 ASSESSMENT — LIFESTYLE VARIABLES
SKIP TO QUESTIONS 9-10: 1
HOW MANY STANDARD DRINKS CONTAINING ALCOHOL DO YOU HAVE ON A TYPICAL DAY: PATIENT DOES NOT DRINK
SUBSTANCE_ABUSE_PAST_12_MONTHS: NO
PRESCIPTION_ABUSE_PAST_12_MONTHS: NO
AUDIT-C TOTAL SCORE: 0
SKIP TO QUESTIONS 9-10: 1
AUDIT-C TOTAL SCORE: 0
HOW OFTEN DO YOU HAVE A DRINK CONTAINING ALCOHOL: NEVER
HOW OFTEN DO YOU HAVE 6 OR MORE DRINKS ON ONE OCCASION: NEVER
AUDIT-C TOTAL SCORE: 0

## 2025-05-16 ASSESSMENT — PAIN SCALES - GENERAL: PAINLEVEL_OUTOF10: 0 - NO PAIN

## 2025-05-16 ASSESSMENT — COGNITIVE AND FUNCTIONAL STATUS - GENERAL
MOBILITY SCORE: 12
CLIMB 3 TO 5 STEPS WITH RAILING: A LOT
HELP NEEDED FOR BATHING: A LOT
PATIENT BASELINE BEDBOUND: NO
TURNING FROM BACK TO SIDE WHILE IN FLAT BAD: A LOT
DRESSING REGULAR UPPER BODY CLOTHING: A LOT
DAILY ACTIVITIY SCORE: 12
TURNING FROM BACK TO SIDE WHILE IN FLAT BAD: A LOT
WALKING IN HOSPITAL ROOM: A LOT
TOILETING: A LOT
EATING MEALS: A LOT
DRESSING REGULAR UPPER BODY CLOTHING: A LOT
WALKING IN HOSPITAL ROOM: A LOT
DRESSING REGULAR LOWER BODY CLOTHING: A LOT
PERSONAL GROOMING: A LOT
MOVING TO AND FROM BED TO CHAIR: A LOT
EATING MEALS: A LOT
MOVING FROM LYING ON BACK TO SITTING ON SIDE OF FLAT BED WITH BEDRAILS: A LOT
DAILY ACTIVITIY SCORE: 12
HELP NEEDED FOR BATHING: A LOT
DRESSING REGULAR LOWER BODY CLOTHING: A LOT
PERSONAL GROOMING: A LOT
MOVING TO AND FROM BED TO CHAIR: A LOT
CLIMB 3 TO 5 STEPS WITH RAILING: A LOT
MOVING FROM LYING ON BACK TO SITTING ON SIDE OF FLAT BED WITH BEDRAILS: A LOT
STANDING UP FROM CHAIR USING ARMS: A LOT
TOILETING: A LOT
STANDING UP FROM CHAIR USING ARMS: A LOT
MOBILITY SCORE: 12

## 2025-05-16 ASSESSMENT — PATIENT HEALTH QUESTIONNAIRE - PHQ9
2. FEELING DOWN, DEPRESSED OR HOPELESS: NOT AT ALL
1. LITTLE INTEREST OR PLEASURE IN DOING THINGS: NOT AT ALL
SUM OF ALL RESPONSES TO PHQ9 QUESTIONS 1 & 2: 0

## 2025-05-16 ASSESSMENT — ACTIVITIES OF DAILY LIVING (ADL)
LACK_OF_TRANSPORTATION: NO
GROOMING: NEEDS ASSISTANCE
HEARING - LEFT EAR: HEARING AID
WALKS IN HOME: NEEDS ASSISTANCE
ASSISTIVE_DEVICE: EYEGLASSES;WALKER
BATHING: NEEDS ASSISTANCE
DRESSING YOURSELF: NEEDS ASSISTANCE
JUDGMENT_ADEQUATE_SAFELY_COMPLETE_DAILY_ACTIVITIES: NO
ADEQUATE_TO_COMPLETE_ADL: NO
HEARING - RIGHT EAR: HEARING AID
TOILETING: NEEDS ASSISTANCE
PATIENT'S MEMORY ADEQUATE TO SAFELY COMPLETE DAILY ACTIVITIES?: NO
FEEDING YOURSELF: INDEPENDENT
LACK_OF_TRANSPORTATION: NO
LACK_OF_TRANSPORTATION: NO

## 2025-05-16 ASSESSMENT — PAIN - FUNCTIONAL ASSESSMENT: PAIN_FUNCTIONAL_ASSESSMENT: 0-10

## 2025-05-16 NOTE — PROGRESS NOTES
Susan Patiño is a 89 y.o. female with past medical history of hypertension, CKD, pulmonary HTN, MI, A-fib on apixaban, heart failure, TIA/CVA, dementia, MI presents to the emergency department for concern for aphasia and hypertension at [facility].  This patient was signed out to me and during the time of signout patient was pending CT abdomen pelvis.  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  Patient was very hypertensive at 185/110.  So patient was started on Cardene drip at 5.  Patient then became tachycardic at 72 so was started on 100 mcg esmolol.  I tried to admit patient to the CICU but they said patient was not appropriate for CICU.  I consulted the vascular team who said patient aortic dissection is chronic and not acute.  Because of this, patient drips were discontinued.  Patient became normotensive.  Patient was then admitted to the medicine team.  I spoke to patient's son and updated him on patient condition.  Patient's son said patient is DNR/CCA.

## 2025-05-16 NOTE — H&P
History Of Present Illness  Susan Patiño is a 89-year-old F with a PMH of paroxysmal A-fib, Alzheimer's disease, CHF, CKD 3B, HTN, MDD, CAD, glaucoma, pulmonary hypertension, osteoarthritis, bilateral carotid artery stenosis, PVD, hyperlipidemia, chronic AAA and dissection, recurrent syncope who presents to [facility] from [facility] with complaints by family of confusion and altered mental status.  Unable to get collateral information from family at this time however chart review shows that family came to visit patient and stated that she was more altered than normal.  She is particularly hard of hearing and usually slow to respond but something was different.  As a result they called EMS and presented to the emergency department.  Seen and evaluated in the emergency department patient laying in bed hard of hearing but responds to name, oriented to self only pleasantly confused.    ED Course:  Vitals: T37 HR 54 RR 16 /100 SpO2 99% on RA  Labs: Please see results section of this note    Imaging pertinent impressions: (Please see summary >rad tab for full impression):   CXR: New interstitial prominence with peribronchial cuffing and curly B-lines compatible with acute pulmonary interstitial edema.  Bibasilar airspace opacities probably atelectasis    CTH: No evidence of acute intracranial abnormality.  Chronic ischemic changes.    CT chest abdomen and pelvis: Severe calcified noncalcified atherosclerotic disease throughout the thoracic and abdominal aorta with diffuse intraluminal irregularity and areas of penetrating ulcers seen within the distal thoracic and upper abdominal aorta.  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  Cardiomegaly and reflux into the hepatic veins component of right heart failure.  Mild pulmonary edema.    Interventions:  s/p ceftriaxone 2 g IVPB x 1, furosemide 40 mg IV x 1, soft restraints, nicardipine drip esmolol  drip  Consults: Vascular surgery, see consult note for recommendations.    ROS: A complete review of systems was performed and is otherwise negative except as noted in HPI     Past Medical History  She has no past medical history on file.    Surgical History  She has no past surgical history on file.     Social History  She reports that she does not currently use alcohol. She reports that she does not use drugs. No history on file for tobacco use.    Family History  Family History[1]     Allergies  Celecoxib    Review of Systems     Physical Exam     Last Recorded Vitals  /74   Pulse 64   Temp 37 °C (98.6 °F)   Resp 16   Wt 50 kg (110 lb 3.7 oz)   SpO2 97%     General Exam  General Appearance: Patient is awake, decreased level of alertness not very cooperative but not in acute distress   Head: NCAT  Eyes: PERRL, EOMI. vision is grossly intact.  Cardiac: Normal S1 and S2. No murmurs. Rhythm is irregular.  Lungs: Diminished and coarse lung sounds bilaterally  Extremities: Warm and perfused peripheral pulses intact no edema.   Neuro: No gross focality  Skin: Skin normal color, texture and turgor with no lesions or eruptions.  Psych: Unable to assess    Relevant Results        Results for orders placed or performed during the hospital encounter of 05/15/25 (from the past 24 hours)   ECG 12 lead   Result Value Ref Range    Ventricular Rate 57 BPM    QRS Duration 90 ms    QT Interval 416 ms    QTC Calculation(Bazett) 404 ms    R Axis 37 degrees    T Axis 98 degrees    QRS Count 10 beats    Q Onset 223 ms    T Offset 431 ms    QTC Fredericia 409 ms   CBC and Auto Differential   Result Value Ref Range    WBC 3.8 (L) 4.4 - 11.3 x10*3/uL    nRBC 0.0 0.0 - 0.0 /100 WBCs    RBC 3.66 (L) 4.00 - 5.20 x10*6/uL    Hemoglobin 10.3 (L) 12.0 - 16.0 g/dL    Hematocrit 33.5 (L) 36.0 - 46.0 %    MCV 92 80 - 100 fL    MCH 28.1 26.0 - 34.0 pg    MCHC 30.7 (L) 32.0 - 36.0 g/dL    RDW 16.0 (H) 11.5 - 14.5 %    Platelets 77 (L) 150  - 450 x10*3/uL    Neutrophils % 60.5 40.0 - 80.0 %    Immature Granulocytes %, Automated 0.5 0.0 - 0.9 %    Lymphocytes % 23.8 13.0 - 44.0 %    Monocytes % 11.0 2.0 - 10.0 %    Eosinophils % 2.9 0.0 - 6.0 %    Basophils % 1.3 0.0 - 2.0 %    Neutrophils Absolute 2.32 1.60 - 5.50 x10*3/uL    Immature Granulocytes Absolute, Automated 0.02 0.00 - 0.50 x10*3/uL    Lymphocytes Absolute 0.91 0.80 - 3.00 x10*3/uL    Monocytes Absolute 0.42 0.05 - 0.80 x10*3/uL    Eosinophils Absolute 0.11 0.00 - 0.40 x10*3/uL    Basophils Absolute 0.05 0.00 - 0.10 x10*3/uL   Comprehensive metabolic panel   Result Value Ref Range    Glucose 83 74 - 99 mg/dL    Sodium 145 136 - 145 mmol/L    Potassium 3.5 3.5 - 5.3 mmol/L    Chloride 115 (H) 98 - 107 mmol/L    Bicarbonate 24 21 - 32 mmol/L    Anion Gap 10 10 - 20 mmol/L    Urea Nitrogen 24 (H) 6 - 23 mg/dL    Creatinine 0.99 0.50 - 1.05 mg/dL    eGFR 51 (L) >60 mL/min/1.73m*2    Calcium 8.1 (L) 8.6 - 10.6 mg/dL    Albumin 3.2 (L) 3.4 - 5.0 g/dL    Alkaline Phosphatase 81 33 - 136 U/L    Total Protein 5.6 (L) 6.4 - 8.2 g/dL    AST 17 9 - 39 U/L    Bilirubin, Total 0.8 0.0 - 1.2 mg/dL    ALT 10 7 - 45 U/L   Lipase   Result Value Ref Range    Lipase 64 9 - 82 U/L   Blood Gas Venous Full Panel   Result Value Ref Range    POCT pH, Venous 7.32 (L) 7.33 - 7.43 pH    POCT pCO2, Venous 43 41 - 51 mm Hg    POCT pO2, Venous 28 (L) 35 - 45 mm Hg    POCT SO2, Venous 53 45 - 75 %    POCT Oxy Hemoglobin, Venous 52.1 45.0 - 75.0 %    POCT Hematocrit Calculated, Venous 31.0 (L) 36.0 - 46.0 %    POCT Sodium, Venous 142 136 - 145 mmol/L    POCT Potassium, Venous 3.5 3.5 - 5.3 mmol/L    POCT Chloride, Venous 114 (H) 98 - 107 mmol/L    POCT Ionized Calicum, Venous 1.13 1.10 - 1.33 mmol/L    POCT Glucose, Venous 81 74 - 99 mg/dL    POCT Lactate, Venous 0.8 0.4 - 2.0 mmol/L    POCT Base Excess, Venous -3.8 (L) -2.0 - 3.0 mmol/L    POCT HCO3 Calculated, Venous 22.2 22.0 - 26.0 mmol/L    POCT Hemoglobin, Venous 10.2  (L) 12.0 - 16.0 g/dL    POCT Anion Gap, Venous 9.0 (L) 10.0 - 25.0 mmol/L    Patient Temperature 37.0 degrees Celsius    FiO2 36 %   B-Type Natriuretic Peptide   Result Value Ref Range     (H) 0 - 99 pg/mL   Troponin I, High Sensitivity, Initial   Result Value Ref Range    Troponin I, High Sensitivity (CMC) 11 0 - 34 ng/L   Troponin, High Sensitivity, 1 Hour   Result Value Ref Range    Troponin I, High Sensitivity (CMC) 11 0 - 34 ng/L   Influenza A, and B PCR   Result Value Ref Range    Flu A Result Not Detected Not Detected    Flu B Result Not Detected Not Detected   Sars-CoV-2 PCR   Result Value Ref Range    Coronavirus 2019, PCR Not Detected Not Detected   Urinalysis with Reflex Culture and Microscopic   Result Value Ref Range    Color, Urine Light-Yellow Light-Yellow, Yellow, Dark-Yellow    Appearance, Urine Turbid (N) Clear    Specific Gravity, Urine 1.016 1.005 - 1.035    pH, Urine 5.0 5.0, 5.5, 6.0, 6.5, 7.0, 7.5, 8.0    Protein, Urine NEGATIVE NEGATIVE, 10 (TRACE), 20 (TRACE) mg/dL    Glucose, Urine Normal Normal mg/dL    Blood, Urine NEGATIVE NEGATIVE mg/dL    Ketones, Urine NEGATIVE NEGATIVE mg/dL    Bilirubin, Urine NEGATIVE NEGATIVE mg/dL    Urobilinogen, Urine Normal Normal mg/dL    Nitrite, Urine 1+ (A) NEGATIVE    Leukocyte Esterase, Urine 75 Shanta/uL (A) NEGATIVE   Microscopic Only, Urine   Result Value Ref Range    WBC, Urine 1-5 1-5, NONE /HPF    RBC, Urine NONE NONE, 1-2, 3-5 /HPF    Squamous Epithelial Cells, Urine 1-9 (SPARSE) Reference range not established. /HPF    Bacteria, Urine 1+ (A) NONE SEEN /HPF    Mucus, Urine FEW Reference range not established. /LPF   Type and Screen   Result Value Ref Range    ABO TYPE O     Rh TYPE POS     ANTIBODY SCREEN NEG    aPTT   Result Value Ref Range    aPTT 43 (H) 26 - 36 seconds   Fibrinogen   Result Value Ref Range    Fibrinogen 247 200 - 400 mg/dL   Heparin Assay   Result Value Ref Range    Heparin Unfractionated 1.6 (HH) See Comment Below for  Therapeutic Ranges IU/mL     XR humerus right  Result Date: 5/16/2025  Interpreted By:  Adin Pickett, STUDY: XR HUMERUS RIGHT; ;  5/16/2025 1:21 am   INDICATION: Signs/Symptoms:fractures.     COMPARISON: None.   ACCESSION NUMBER(S): [id number]   ORDERING CLINICIAN: OMID KERN   FINDINGS: Two views of the right humerus.   No acute fracture or malalignment. Severe glenohumeral osteoarthropathy. Mild acromioclavicular osteoarthropathy. Soft tissue swelling about the distal humerus.       No evidence of acute osseous abnormality involving the right humerus.     MACRO: None   Signed by: Adin Pickett 5/16/2025 2:25 AM Dictation workstation:   [id number]    CT chest abdomen pelvis w IV contrast  Addendum Date: 5/16/2025  Interpreted By:  Adin Pickett, ADDENDUM: There is no dissection of the aorta or superior mesenteric artery. There is a least moderate narrowing of the celiac axis and SMA on the basis of extensive athero sclerotic plaque, as mentioned under findings; point 2 of the impression in the original report is erroneous.   Signed by: Adin Pickett 5/16/2025 2:24 AM   -------- ORIGINAL REPORT -------- Dictation workstation:   [id number]    Result Date: 5/16/2025  Interpreted By:  Adin Picktet and Kamau Nyokabi STUDY: CT CHEST ABDOMEN PELVIS W IV CONTRAST;  5/15/2025 11:05 pm   INDICATION: Signs/Symptoms:ams r/o acute traumatic injury pneumonia.   COMPARISON: CT chest 03/20/2017 and CT abdomen pelvis 09/10/2009   ACCESSION NUMBER(S): [id number]   ORDERING CLINICIAN: MAURICE LARA   TECHNIQUE: Contiguous axial images of the chest, abdomen, and pelvis were obtained after the intravenous administration of 100 cc iodinated contrast. Coronal and sagittal reformatted images were reconstructed from the axial data.   FINDINGS: CT CHEST:   MEDIASTINUM AND LYMPH NODES: Circumferential wall thickening of the esophagus. No enlarged intrathoracic or axillary lymph nodes by imaging criteria. No pneumomediastinum.    VESSELS:  Normal caliber aorta without dissection. Severe calcified and noncalcified atherosclerotic plaques throughout the thoracic aorta with intraluminal irregularity. There is a penetrating atherosclerotic ulcer in the distal descending thoracic aorta measuring 2.1 cm (series 301, image 21). Of note there is moderate degree of atherosclerotic calcifications within the partially visualized mid right common carotid artery.   HEART: Cardiomegaly with reflux of contrast into the hepatic veins. Small pericardial effusion. Mild coronary artery disease. Mild vascular calcification of the aortic valve.   LUNG, AIRWAYS, PLEURA: The trachea and central airways are patent. No endobronchial lesion. There is interlobular septal thickening. Small bilateral pleural effusions with bibasilar atelectasis. No focal consolidation or pneumothorax.   CHEST WALL SOFT TISSUES: No discernible acute abnormality.   CT ABDOMEN/PELVIS:   ABDOMINAL WALL: No significant abnormality.   LIVER: Liver is normal in size. Punctate calcification likely sequela of remote granulomatous disease.   BILE DUCTS: No significant intrahepatic or extrahepatic dilatation.   GALLBLADDER: Cholelithiasis and choledocholithiasis (series 201, image 95). No gallbladder wall thickening, or pericholecystic fluid to suggest acute cholecystitis.   PANCREAS: No significant abnormality.   SPLEEN: No significant abnormality.   ADRENALS: No significant abnormality.   KIDNEYS, URETERS, BLADDER: Bilateral right-greater-than-left renal cortical atrophy with multiple renal cysts. Within the upper pole of the right kidney there is an arterial enhancing circumscribed lesion measuring 2.0 x 2.0 x 1.9 cm (series 301, image 34 and series 302, image 61).   REPRODUCTIVE ORGANS: The uterus is present and within normal limits.   VESSELS: Severe calcified noncalcified atherosclerotic plaques with diffuse intraluminal irregularity of the abdominal aorta and its branches. Infrarenal  fusiform abdominal aortic aneurysm just superior to the aortic bifurcation measuring 4.0 x 3.0 cm in maximum transaxial diameters (series 301, image 60). This appears new since prior CT 09/10/2009. Severe narrowing of the renal arteries. Severe narrowing of the SMA and celiac axis.. At least moderate narrowing of the splenic artery. A least moderate narrowing of the common, external and internal iliac arteries.     RETROPERITONEUM/LYMPH NODES: No enlarged lymph nodes. No acute retroperitoneal abnormality.   BOWEL/MESENTERY/PERITONEUM: Stomach is within normal limits. Small and large bowel are normal in caliber without evidence of inflammatory changes or obstruction. Colonic diverticulosis without acute diverticulitis. No abnormal enhancement of the small and large bowel to suggest ischemic bowel. The appendix is not definitely visualized however no inflammatory changes in the expected location to suggest acute appendicitis.   Trace abdominopelvic free fluid. No loculated fluid collections. No pneumoperitoneum.     MUSCULOSKELETAL: No acute osseous abnormality. End-stage glenohumeral joint osteoarthrosis with subchondral collapse. There is a lytic lesion within the right humeral head measuring 2.8 x 1.7 cm (series 201, image 60). There are intra-articular ossific fragments the right shoulder joint inferiorly. Moderate to severe left glenohumeral joint degenerative changes. Diffuse bone demineralization. Mild bilateral hip and pubic joint osteoarthrosis. Moderate to severe degenerative changes within the thoracic and lumbar spine. Mild anterolisthesis of L4 on L5.       1. Severe calcified/noncalcified atherosclerotic disease throughout the thoracic and abdominal aorta with diffuse intraluminal irregularity and areas of penetrating ulcers seen within the distal thoracic and upper abdominal aorta as described above. There is a infrarenal fusiform abdominal aortic aneurysm just superior to the bifurcation measuring 4.0  x 3.0 cm which is new since CT 09/10/2009. No evidence of abdominal aortic rupture. 2. 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 3. Circumscribed arterial enhancing lesion in the upper pole of the right kidney measuring 2.0 x 2.0 cm concerning for primary renal malignancy/renal cell carcinoma. Recommend urologic follow-up. 4. End-stage glenohumeral joint osteoarthrosis with subchondral collapse. Moderate to severe left glenohumeral joint degenerative changes. 5. Cardiomegaly with reflux of contrast into the hepatic veins, which may reflect a component of right heart failure. Small pericardial effusion. Mild coronary artery disease. 6. There is interlobular septal thickening, suggestive of mild pulmonary edema. 7. Small bilateral pleural effusions with bibasilar atelectasis 8. Cholelithiasis and choledocholithiasis. No acute cholecystitis. 9. Circumferential wall thickening of the esophagus, suggestive of underlying esophagitis. . 10. Mild vascular calcification of the aortic valve, recommend correlating for aortic stenosis. 11. Circumferential wall thickening of the esophagus, suggestive of underlying esophagitis. 12. Colonic diverticulosis without acute diverticulitis. 13. Multi-cystic atrophic kidneys.     I personally reviewed the images/study and I agree with radiology resident Dr. Denny Villegas findings as stated. This study was interpreted at [place], [place]   MACRO: Denny Villegas discussed the significance and urgency of this critical finding via [employer] secure chat with direct feedback with  Rita Alvarado MD on 5/16/2025 at 12:14 am.  (**-RCF-**) Findings:  See findings.   Signed by: Adin Pickett 5/16/2025 12:33 AM Dictation workstation:   [id number]    CT angio neck  Result Date: 5/16/2025  Interpreted By:  Adin Pickett and Kamau Nyokabi STUDY: CT ANGIO NECK;  5/16/2025 12:37 am   INDICATION:  Signs/Symptoms:dissection.   COMPARISON: CT head 05/15/2025   ACCESSION NUMBER(S): [id number]   ORDERING CLINICIAN: OMID KERN   TECHNIQUE: Unenhanced CT images of the head were obtained. Subsequently, 75 ML of Omnipaque 350 was administered intravenously and axial images of the head and neck were acquired.  Coronal, sagittal, and 3-D reconstructions were provided for review.   FINDINGS: CTA NECK FINDINGS:   Severe calcifications of the aortic arch. Moderate to severe narrowing of the right subclavian artery proximal and distal to the vertebral artery origin. Mild-to-moderate narrowing of the origin of the left subclavian artery.   Right carotid vessels: Moderate atherosclerosis of the common carotid artery with mild distal stenosis.  Severe atherosclerotic disease of the bifurcation and proximal cervical internal carotid artery with at least 50-60 % stenosis by NASCET criteria. Mild atherosclerotic calcifications of the intracranial internal carotid artery. The ECA demonstrates focal occlusion or high-grade narrowing proximally with reconstitution about the level of the angle of the mandible.   Left carotid vessels: Mild-to-moderate focal narrowing of the origin of the common carotid artery (series 402, images 81-85). Mild-to-moderate partially calcified plaque in the mid to distal common carotid artery without hemodynamically significant luminal narrowing. Moderate plaque about the bifurcation of the proximal ICA with 50-60% luminal narrowing of the postbulbar ICA by NASCET criteria. The more distal cervical ICA demonstrates mild-to-moderate plaque without hemodynamically significant luminal narrowing.   Vertebral vessels: Moderate narrowing of the V1 segment of the left vertebral artery. Mild plaque in the more distal cervical vertebral arteries without hemodynamically significant luminal narrowing.   No evidence of dissection or pseudoaneurysm.   Partially visualized interlobular septal thickening suggesting  acute pulmonary interstitial and small left pleural effusion.   Incidentally noted air in the jugular veins likely introduced during IV catheterization and probably of nominal clinical significance.         Severe calcifications of the aortic arch. Moderate to severe narrowing of the right subclavian artery proximal and distal to the vertebral artery origin. Mild-to-moderate narrowing of the origin of the left subclavian artery.   Right carotid vessels: Moderate atherosclerosis of the common carotid artery with mild distal stenosis.  Severe atherosclerotic disease of the bifurcation and proximal cervical internal carotid artery with at least 50-60 % stenosis by NASCET criteria. Mild atherosclerotic calcifications of the intracranial internal carotid artery. The ECA demonstrates focal occlusion or high-grade narrowing proximally with reconstitution about the level of the angle of the mandible.   Left carotid vessels: Mild-to-moderate focal narrowing of the origin of the common carotid artery (series 402, images 81-85). Mild-to-moderate partially calcified plaque in the mid to distal common carotid artery without hemodynamically significant luminal narrowing. Moderate plaque about the bifurcation of the proximal ICA with 50-60% luminal narrowing of the postbulbar ICA by NASCET criteria. The more distal cervical ICA demonstrates mild-to-moderate plaque without hemodynamically significant luminal narrowing.   Vertebral vessels: Moderate narrowing of the V1 segment of the left vertebral artery. Mild plaque in the more distal cervical vertebral arteries without hemodynamically significant luminal narrowing.   No evidence of dissection or pseudoaneurysm.   Partially visualized interlobular septal thickening suggesting acute pulmonary interstitial and small left pleural effusion.   Incidentally noted air in the jugular veins likely introduced during IV catheterization and probably of nominal clinical significance. I  personally reviewed the images/study and I agree with radiology resident Dr. Denny Villegas findings as stated. This study was interpreted at [facility], [place], [place]   MACRO: None   Signed by: Adin Pickett 5/16/2025 1:50 AM Dictation workstation:   [id number]    ECG 12 lead  Result Date: 5/15/2025  Atrial fibrillation with slow ventricular response Anteroseptal infarct (cited on or before 15-MAY-2025) Abnormal ECG When compared with ECG of 31-AUG-2023 00:35, Previous ECG has undetermined rhythm, needs review ST no longer depressed in Inferior leads ST no longer depressed in Anterior leads ST more depressed in Lateral leads T wave inversion no longer evident in Inferior leads Nonspecific T wave abnormality, worse in Lateral leads See ED provider note for full interpretation and clinical correlation Confirmed by Nini Encarnacion ([id number]) on 5/15/2025 11:35:58 PM    CT head wo IV contrast  Result Date: 5/15/2025  Interpreted By:  Adin Pickett and Kamau Nyokabi STUDY: CT HEAD WO IV CONTRAST;  5/15/2025 7:32 pm   INDICATION: Signs/Symptoms:ams r/o sah ich.   COMPARISON: MRI brain of 03/07/2006.   ACCESSION NUMBER(S): [id number]   ORDERING CLINICIAN: MACIE RIBEIRO   TECHNIQUE: Noncontrast axial CT images of head were obtained with coronal and sagittal reconstructed images.   FINDINGS: BRAIN PARENCHYMA: Advanced volume loss. Right frontal cortical encephalomalacia. Left occipital encephalomalacia. Small focus of the right occipital cortical encephalomalacia. Multifocal chronic lacunar infarcts in the cerebellar hemispheres. No mass-effect, midline shift or effacement of cerebral sulci. Patchy and confluent subcortical and periventricular white matter hypodensities which are nonspecific but can be seen in the setting of moderate small-vessel ischemic disease given patient's age.   HEMORRHAGE: No acute intracranial hemorrhage.   VENTRICLES and EXTRA-AXIAL SPACES: There is prominence of the  ventricles, cortical sulci, and basal cisterns compatible with moderate age-related volume loss. . No abnormal extra-axial fluid collection.   ORBITS: The visualized orbits and globes are within normal limits.   EXTRACRANIAL SOFT TISSUES: Within normal limits.   PARANASAL SINUSES/MASTOIDS: Mild mucosal thickening within the ethmoid sinuses and right maxillary sinus. Left mastoid and middle ear effusion. Right mastoid antrum and air cells are clear. Remaining paranasal sinuses are clear   CALVARIUM: No depressed skull fracture.       No evidence of acute intracranial abnormality. Chronic ischemic changes.   Left mastoid and middle ear effusions; correlate clinically with any concern for acute otomastoiditis.   I personally reviewed the images/study and I agree with radiology resident Dr. Denny Villegas findings as stated. This study was interpreted at [place], [place]   MACRO: None   Signed by: Adin Pickett 5/15/2025 8:56 PM Dictation workstation:   [id number]    XR chest 2 views  Result Date: 5/15/2025  Interpreted By:  Adin Pickett and Kamau Nyokabi STUDY: XR CHEST 2 VIEWS;  5/15/2025 7:22 pm   INDICATION: Signs/Symptoms:ams.   COMPARISON: Chest x-ray 02/02/2017, CT chest 03/28/2017   ACCESSION NUMBER(S): [id number]   ORDERING CLINICIAN: MACIE RIBEIRO   FINDINGS: PA and lateral radiographs of the chest were provided.   Limited slightly by soft tissue attenuation factors and suboptimal positioning apical kyphosis and rotation rightward.   MEDICAL DEVICES: None.   CARDIOMEDIASTINAL SILHOUETTE: Cardiac silhouette is enlarged, unchanged aortic arch calcifications.   LUNGS: New interstitial prominence with peribronchial cuffing and Kerley B-lines compatible with acute pulmonary interstitial edema/CHF. Bibasilar airspace opacities, probably atelectasis. Small pleural effusions. No pneumothorax.   ABDOMEN: No remarkable upper abdominal findings.   BONES: Advanced right  glenohumeral joint osteoarthrosis with subchondral erosion of the right humeral head. Severe left glenohumeral joint degenerative change.       1. New interstitial prominence with peribronchial cuffing and Kerley B-lines compatible with acute pulmonary interstitial edema/CHF. Bibasilar airspace opacities, probably atelectasis. Small pleural effusions. No pneumothorax.     I personally reviewed the images/study and I agree with radiology resident Dr. Denny Villegas findings as stated. This study was interpreted at [place], [place]   MACRO: None   Signed by: Adin Pickett 5/15/2025 8:51 PM Dictation workstation:   [id number]    XR hip left with pelvis when performed 2 or 3 views  Result Date: 5/15/2025  Interpreted By:  Adin Pickett and Kamau Nyokabi STUDY: XR HIP LEFT WITH PELVIS WHEN PERFORMED 2 OR 3 VIEWS; ;  5/15/2025 7:22 pm   INDICATION: Signs/Symptoms:left hip pain.   COMPARISON: None.   ACCESSION NUMBER(S): [id number]   ORDERING CLINICIAN: MACIE RIBEIRO   FINDINGS: Single AP view of the pelvis and two views of the left hip.   Bilateral sacroiliac joints and hip joints are intact. No acute fractures visualized.   Moderate degenerative changes of the lower lumbar spine.   No retained radiopaque foreign body or subcutaneous emphysema.       1. No acute fracture or malalignment of the pelvis or left hip.     I personally reviewed the images/study and I agree with radiology resident Dr. Denny Villegas findings as stated. This study was interpreted at [place], [place]   MACRO: None   Signed by: Adin Pickett 5/15/2025 8:50 PM Dictation workstation:   [id number]         Assessment/Plan   Assessment & Plan  Altered mental status, unspecified altered mental status type      Susan Patiño is a 89-year-old F with extensive and complex past medical history presenting with altered mental status.  Will admit to medicine for further  management.    #Altered mental status  - Will need family's collateral information to find out how much of from her baseline she is as she has cognitive impairment at baseline  - Etiology for altered mental status likely multifactorial including infectious etiology in the setting of UTI, cardiorespiratory with mild CHF exacerbation, mild pulmonary edema with recurrent episodes of hypoxia on continuous pulse ox requiring supplemental oxygenation, hypertensive encephalopathy, BPs 190s over 100s on presentation  - No gross evidence of liver or kidney failure, no hyperglycemic crisis, no gross electrolyte abnormalities, no pneumonia, no CNS pathology on CT head and med rec without medications to cause drug-induced cognitive impairment  - See further plan below for UTI, CHF and blood pressure    #UTI  - Positive nitrites and leukocyte esterase  - Status post ceftriaxone will continue  - Follow-up urine culture, narrow de-escalate antibiotics based on results    #CHF exacerbation, mild  - BNP elevated to the 200s, chest imaging showing pulmonary edema, hypoxia and bilateral lower extremity edema  - Status post Lasix 40 mg IV in the emergency department  - Will continue with gentle diuresis at this time while monitoring blood pressure and renal function  - Supplemental oxygenation as needed to maintain SpO2 >90% in the setting of hypoxia    #Hypertensive crisis  - Significantly elevated blood pressures on arrival  - There was concern for acute aortic dissection and she was placed on esmolol and nicardipine drips, this was discontinued as vascular imaging appears chronic  -Continue home metoprolol succinate 100 mg daily and lisinopril 10 mg daily  - Will add on as needed antihypertensives with parameters, will avoid hydralazine as it is contraindicated will consider labetalol    #CT imaging with AAA and aortic dissection  - Vascular surgery consulted no vascular surgery intervention recommended AAA and aortic dissection  appears chronic      #Paroxysmal A-fib  - Currently EKG A-fib with slow ventricular rate  - Continue apixaban 2.5 mg twice daily  - Continue home metoprolol succinate 100 mg daily    #HLD  - Continue home atorvastatin 40 mg nightly    #Glaucoma  - Continue home drops    F: None  E: Replete as needed  N: N.p.o. until swallow eval  GI: None  DVT: Apixaban  CODE STATUS: (She is DNRCCA per facility paper work which is different that DNRCC) with make DNR/DNI for now would need updated ACP documented.         A total of >75 minutes was spent on this visit reviewing previous notes including inpatient ED/Consult notes, chart review of ambulatory records in [facility] and OSH records in [facility], ordering tests and add on labs, medication reconciliation and adjusting meds, and documenting the findings in the note.    Fred Gaona MD         [1] No family history on file.

## 2025-05-16 NOTE — PROGRESS NOTES
"Pharmacy Medication History Review    Susan Patiño is a 89 y.o. female in ED for No Principal Problem: There is no principal problem currently on the Problem List. Please update the Problem List and refresh.. Pharmacy reviewed the patient's [id number] medications and allergies for accuracy.      PTA Medication List has been updated as appears on Order Summary Report from [facility] (5/15/25, 17:09:59 ET).    A copy of this facility paperwork has been uploaded to \"Media\" under Chart Review in Epic for future reference.       The list below reflects the updated PTA list.   Prior to Admission Medications   Prescriptions  Facility Reported?   acetaminophen (Tylenol) 500 mg tablet  Yes   Sig: Take 2 tablets (1,000 mg) by mouth 3 times a day. For pain   apixaban (Eliquis) 2.5 mg tablet  Yes   Sig: Take 1 tablet (2.5 mg) by mouth 2 times a day.   atorvastatin (Lipitor) 40 mg tablet  Yes   Sig: Take 1 tablet (40 mg) by mouth once daily at bedtime.   brimonidine (AlphaGAN) 0.2 % ophthalmic solution  Yes   Sig: Administer 1 drop into the left eye twice a day.   cholecalciferol (Vitamin D-3) 50 mcg (2,000 units) tablet  Yes   Sig: Take 1 tablet (50 mcg) by mouth once daily.   diclofenac sodium (Voltaren) 1 % gel  Yes   Sig: Apply 2.25 inches (2 g) topically 2 times a day.   To affected joint(s)   dorzolamide-timoloL (Cosopt) 22.3-6.8 mg/mL ophthalmic solution  Yes   Sig: Administer 1 drop into the left eye 2 times a day.   furosemide (Lasix) 20 mg tablet  Yes   Sig: Take 1 tablet (20 mg) by mouth once daily.   ipratropium-albuteroL (Duo-Neb) 0.5-2.5 mg/3 mL nebulizer solution  Yes   Sig: Take 3 mL by nebulization every 8 hours.   ipratropium-albuteroL (Duo-Neb) 0.5-2.5 mg/3 mL nebulizer solution  Yes   Sig: Take 3 mL by nebulization every 8 hours if needed for wheezing.   latanoprost (Xalatan) 0.005 % ophthalmic solution  Yes   Sig: Administer 1 drop into the left eye once daily at bedtime. "   lisinopril 10 mg tablet  Yes   Sig: Take 1 tablet (10 mg) by mouth once daily.   loratadine (Claritin) 10 mg tablet  Yes   Sig: Take 1 tablet (10 mg) by mouth every other day.   metoprolol succinate XL (Toprol-XL) 100 mg 24 hr tablet  Yes   Sig: Take 1 tablet (100 mg) by mouth once daily.   mirtazapine (Remeron) 7.5 mg tablet  Yes   Sig: Take 1 tablet (7.5 mg) by mouth once daily at bedtime.   ondansetron (Zofran) 4 mg tablet  Yes   Sig: Take 1 tablet (4 mg) by mouth every 8 hours if needed   for nausea or vomiting.   potassium chloride ER (Micro-K) 10 mEq ER tablet  Yes   Sig: Take 1 tablet (10 mEq) by mouth once daily.   sertraline (Zoloft) 25 mg tablet  Yes   Sig: Take 1 tablet (25 mg) by mouth once daily.   vit C/E/zinc/lutein/zeaxanthin (OCUVITE EYE HEALTH ORAL)  Yes   Sig: Take 1 tablet by mouth once daily.   Ocuvite Eye + Multi Oral Tablet      sodium chloride (Ocean) 0.65 % nasal spray  Yes   Sig: Administer 1 spray into each nostril 2 times a day. For dry nasal           The list below reflects the updated allergy list. Please review each documented allergy for additional clarification and justification.  Allergies  Reviewed by Rita Alvarado MD on 5/15/2025        Severity Reactions Comments    Celecoxib Not Specified Unknown           ADDED to PTA List:  Diclofenac Gel  DuoNeb PRN order  Ocuvite Mutivitamin  Saline Nasal Spray    CHANGED in PTA List:  Acetaminophen every 8 hours PRN --> TID  Brimonidine 0.1% --> 0.2%  Ondansetron Solution --> Tablet  Loratadine daily --> every 48 hours    REMOVED from PTA List:  Zeaxanthin     ----------------------------------  Rogerio Bailey PharmD, [facility]  Transitions of Care Pharmacist  Medication reconciliation complete  Please reach out via Epic Secure Chat (7h-9w) for questions,   or if no response call [phone].

## 2025-05-16 NOTE — CONSULTS
VASCULAR SURGERY CONSULT NOTE    Assessment/Plan   Susan Patiño is 89 y.o. female with history of HTN, CKD, pulmonary HTN, MI, afib (apixaban), HF, TIA/CVA, dementia, MI presents to ED for AMS and hypertension. CT CAP demonstrating an infrarenal fusiform AAA and aortic dissection of proximal SMA. Vascular surgery consulted for AAA.       On review of CT, AAA is stable and dissection appears chronic.     Plan:  Would recommend admission to medicine for management of hypertension and heart failure  No vascular surgery intervention would be recommended at this time.    D/w Dr. Khanna, vascular fellow who discussed with Dr. Loyd.     Sima Gilmore MD  General Surgery PGY2  Vascular Surgery    Seen and examined and agree with resident team.  Vascular surgery will sign off at this time.  No follow up needed.    Guillermo Khanna MD  Vascular Surgery Fellow  Team Pager [phone]  05/16/25  7:19 AM        Subjective   HPI:  Susan Patiño is 89 y.o. female with history of HTN, CKD, pulmonary HTN, MI, afib (apixaban), HF, TIA/CVA, dementia, MI presents to ED for AMS and hypertension. CT CAP demonstrating an infrarenal fusiform AAA and aortic dissection of proximal SMA. Vascular surgery consulted for AAA.     Patient was transferred from her facility at [place] due to concern for altered mental status. She has no complaints on assessment.      Patient denies abdominal pain. No nausea or emesis.     Vascular History:  Asymptomatic critical L ICA stenosis identified    PMH:   HTN  Afib   HF  TIA/CVA  Dementia  OA    PSH:   Epigastric hernia repair/reduction     Home Meds:  Medications Ordered Prior to Encounter[1]     Allergies:  RX Allergies[2]    SH/FH:   unknown    ROS: 12 system negative except HPI    Objective   Vitals:  Heart Rate:  [54-79]   Temperature:  [37 °C (98.6 °F)]   Respirations:  [16-18]   BP: ()/()   Weight:  [50 kg (110 lb 3.7 oz)]   Pulse Ox:  [87 %-100 %]      Exam:  Constitutional: No acute distress, resting comfortably  Neuro:  confused, hard of hearing, oriented to self  ENMT: moist mucous membranes  Head/neck: atraumatic  CV: no tachycardia, rate controlled afib  Pulm: non-labored on room air  GI: soft, non-tender, non-distended  Skin: warm and dry  Musculoskeletal: moving all extremities  Extremities: Palpable femoral, DP, PT bilaterally.     Labs:  Results from last 7 days   Lab Units 05/15/25  1847   WBC AUTO x10*3/uL 3.8*   HEMOGLOBIN g/dL 10.3*   PLATELETS AUTO x10*3/uL 77*      Results from last 7 days   Lab Units 05/15/25  1847   SODIUM mmol/L 145   POTASSIUM mmol/L 3.5   CHLORIDE mmol/L 115*   CO2 mmol/L 24   BUN mg/dL 24*   CREATININE mg/dL 0.99   GLUCOSE mg/dL 83      Results from last 7 days   Lab Units 05/16/25  0046   APTT seconds 43*     Results from last 7 days   Lab Units 05/16/25  0046   ANTI XA UNFRACTIONATED IU/mL 1.6*       Imaging:  Reviewed independently by vascular team:  XR humerus right  Result Date: 5/16/2025  No evidence of acute osseous abnormality involving the right humerus.     MACRO: None   Signed by: Adin Pickett 5/16/2025 2:25 AM Dictation workstation:   [id number]    CT chest abdomen pelvis w IV contrast  Addendum Date: 5/16/2025  Interpreted By:  Adin Pickett, ADDENDUM: There is no dissection of the aorta or superior mesenteric artery. There is a least moderate narrowing of the celiac axis and SMA on the basis of extensive athero sclerotic plaque, as mentioned under findings; point 2 of the impression in the original report is erroneous.   Signed by: Adin Pickett 5/16/2025 2:24 AM   -------- ORIGINAL REPORT -------- Dictation workstation:   [id number]    Result Date: 5/16/2025  1. Severe calcified/noncalcified atherosclerotic disease throughout the thoracic and abdominal aorta with diffuse intraluminal irregularity and areas of penetrating ulcers seen within the distal thoracic and upper abdominal aorta as described above. There  is a infrarenal fusiform abdominal aortic aneurysm just superior to the bifurcation measuring 4.0 x 3.0 cm which is new since CT 09/10/2009. No evidence of abdominal aortic rupture. 2. 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 3. Circumscribed arterial enhancing lesion in the upper pole of the right kidney measuring 2.0 x 2.0 cm concerning for primary renal malignancy/renal cell carcinoma. Recommend urologic follow-up. 4. End-stage glenohumeral joint osteoarthrosis with subchondral collapse. Moderate to severe left glenohumeral joint degenerative changes. 5. Cardiomegaly with reflux of contrast into the hepatic veins, which may reflect a component of right heart failure. Small pericardial effusion. Mild coronary artery disease. 6. There is interlobular septal thickening, suggestive of mild pulmonary edema. 7. Small bilateral pleural effusions with bibasilar atelectasis 8. Cholelithiasis and choledocholithiasis. No acute cholecystitis. 9. Circumferential wall thickening of the esophagus, suggestive of underlying esophagitis. . 10. Mild vascular calcification of the aortic valve, recommend correlating for aortic stenosis. 11. Circumferential wall thickening of the esophagus, suggestive of underlying esophagitis. 12. Colonic diverticulosis without acute diverticulitis. 13. Multi-cystic atrophic kidneys.     I personally reviewed the images/study and I agree with radiology resident Dr. Denny Villegas findings as stated. This study was interpreted at [place], [place]   MACRO: Denny Villegas discussed the significance and urgency of this critical finding via [employer] secure chat with direct feedback with  Rita Alvarado MD on 5/16/2025 at 12:14 am.  (**-RCF-**) Findings:  See findings.   Signed by: Adin Pickett 5/16/2025 12:33 AM Dictation workstation:   [id number]    CT angio neck  Result Date: 5/16/2025  Severe calcifications of  the aortic arch. Moderate to severe narrowing of the right subclavian artery proximal and distal to the vertebral artery origin. Mild-to-moderate narrowing of the origin of the left subclavian artery.   Right carotid vessels: Moderate atherosclerosis of the common carotid artery with mild distal stenosis.  Severe atherosclerotic disease of the bifurcation and proximal cervical internal carotid artery with at least 50-60 % stenosis by NASCET criteria. Mild atherosclerotic calcifications of the intracranial internal carotid artery. The ECA demonstrates focal occlusion or high-grade narrowing proximally with reconstitution about the level of the angle of the mandible.   Left carotid vessels: Mild-to-moderate focal narrowing of the origin of the common carotid artery (series 402, images 81-85). Mild-to-moderate partially calcified plaque in the mid to distal common carotid artery without hemodynamically significant luminal narrowing. Moderate plaque about the bifurcation of the proximal ICA with 50-60% luminal narrowing of the postbulbar ICA by NASCET criteria. The more distal cervical ICA demonstrates mild-to-moderate plaque without hemodynamically significant luminal narrowing.   Vertebral vessels: Moderate narrowing of the V1 segment of the left vertebral artery. Mild plaque in the more distal cervical vertebral arteries without hemodynamically significant luminal narrowing.   No evidence of dissection or pseudoaneurysm.   Partially visualized interlobular septal thickening suggesting acute pulmonary interstitial and small left pleural effusion.   Incidentally noted air in the jugular veins likely introduced during IV catheterization and probably of nominal clinical significance. I personally reviewed the images/study and I agree with radiology resident Dr. Denny Villegas findings as stated. This study was interpreted at [place], [place]   MACRO: None   Signed by: Adin  Piyush 5/16/2025 1:50 AM Dictation workstation:   [id number]         [1]   No current facility-administered medications on file prior to encounter.     Current Outpatient Medications on File Prior to Encounter   Medication Sig Dispense Refill    acetaminophen (Tylenol) 500 mg tablet Take 2 tablets (1,000 mg) by mouth 3 times a day. For pain      apixaban (Eliquis) 2.5 mg tablet Take 1 tablet (2.5 mg) by mouth 2 times a day.      atorvastatin (Lipitor) 40 mg tablet Take 1 tablet (40 mg) by mouth once daily at bedtime.      brimonidine (AlphaGAN) 0.2 % ophthalmic solution Administer 1 drop into the left eye twice a day.      cholecalciferol (Vitamin D-3) 50 mcg (2,000 units) tablet Take 1 tablet (50 mcg) by mouth once daily.      diclofenac sodium (Voltaren) 1 % gel Apply 2.25 inches (2 g) topically 2 times a day. To affected joint(s)      dorzolamide-timoloL (Cosopt) 22.3-6.8 mg/mL ophthalmic solution Administer 1 drop into the left eye 2 times a day.      furosemide (Lasix) 20 mg tablet Take 1 tablet (20 mg) by mouth once daily.      ipratropium-albuteroL (Duo-Neb) 0.5-2.5 mg/3 mL nebulizer solution Take 3 mL by nebulization every 8 hours.      ipratropium-albuteroL (Duo-Neb) 0.5-2.5 mg/3 mL nebulizer solution Take 3 mL by nebulization every 8 hours if needed for wheezing.      latanoprost (Xalatan) 0.005 % ophthalmic solution Administer 1 drop into the left eye once daily at bedtime.      lisinopril 10 mg tablet Take 1 tablet (10 mg) by mouth once daily.      loratadine (Claritin) 10 mg tablet Take 1 tablet (10 mg) by mouth every other day.      metoprolol succinate XL (Toprol-XL) 100 mg 24 hr tablet Take 1 tablet (100 mg) by mouth once daily.      mirtazapine (Remeron) 7.5 mg tablet Take 1 tablet (7.5 mg) by mouth once daily at bedtime.      ondansetron (Zofran) 4 mg tablet Take 1 tablet (4 mg) by mouth every 8 hours if needed for nausea or vomiting.      potassium chloride CR (Klor-Con) 10 mEq ER tablet Take 1  tablet (10 mEq) by mouth once daily.      sertraline (Zoloft) 25 mg tablet Take 1 tablet (25 mg) by mouth once daily.      sodium chloride (Ocean) 0.65 % nasal spray Administer 1 spray into each nostril 2 times a day. For dry nasal      vit C/E/zinc/lutein/zeaxanthin (OCUVITE EYE HEALTH ORAL) Take 1 tablet by mouth once daily. Ocuvite Eye + Multi Oral Tablet      [DISCONTINUED] Eliquis 2.5 mg tablet Take 1 tablet (2.5 mg) by mouth 2 times a day.      [DISCONTINUED] ZEAXANTHIN, BULK, MISC Take 1 tablet by mouth once daily.     [2]   Allergies  Allergen Reactions    Celecoxib Unknown

## 2025-05-16 NOTE — PROGRESS NOTES
05/16/25 1603   Discharge Planning   Living Arrangements Other (Comment)  (Nursing Home Resident)   Support Systems Family members   Assistance Needed Yes. Nursing Home Resident   Type of Residence Nursing home/residential care  ([facility])   Who is requesting discharge planning? Provider   Expected Discharge Disposition Inter   Does the patient need discharge transport arranged? Yes   RoundTrip coordination needed? Yes   Financial Resource Strain   How hard is it for you to pay for the very basics like food, housing, medical care, and heating? Not hard   Housing Stability   In the last 12 months, was there a time when you were not able to pay the mortgage or rent on time? N   In the past 12 months, how many times have you moved where you were living? 0   At any time in the past 12 months, were you homeless or living in a shelter (including now)? N   Transportation Needs   In the past 12 months, has lack of transportation kept you from medical appointments or from getting medications? no   In the past 12 months, has lack of transportation kept you from meetings, work, or from getting things needed for daily living? No   Patient Choice   Provider Choice list and CMS website (https://medicare.gov/care-compare#search) for post-acute Quality and Resource Measure Data were provided and reviewed with: Family   Patient / Family choosing to utilize agency / facility established prior to hospitalization Yes   Stroke Family Assessment   Stroke Family Assessment Needed No   Intensity of Service   Intensity of Service 0-30 min     TCC spoke to hermes Oakley via phone. Pt is a long-term resident at [facility] (Room 2224). Plan will be for her to return to there upon discharge. Pt is normally able to sit up in chair. Son provided patient's granddaughter's name (Rebecca Johnson) for another emergency contact. PCP is Dr. Acosta @ [facility]. Pt has moved out of ED and into [address]; room information provided to  son.    Information sent to [facility] via [employer].    Lyndsay Willingham TCC

## 2025-05-16 NOTE — CONSULTS
VASCULAR SURGERY CONSULT NOTE    Assessment/Plan   Susan Patiño is 89 y.o. female with history of HTN, CKD, pulmonary HTN, MI, afib (apixaban), HF, TIA/CVA, dementia, MI presents to ED for AMS and hypertension. CT CAP demonstrating an infrarenal fusiform AAA and aortic dissection of proximal SMA. Vascular surgery consulted for AAA.       On review of CT, AAA is stable and dissection appears chronic.     Plan:  Would recommend admission to medicine for management of hypertension and heart failure  No vascular surgery intervention would be recommended at this time.    D/w Dr. Khanna, vascular fellow who discussed with Dr. Loyd.         Subjective   HPI:  Susan Patiño is 89 y.o. female with history of HTN, CKD, pulmonary HTN, MI, afib (apixaban), HF, TIA/CVA, dementia, MI presents to ED for AMS and hypertension. CT CAP demonstrating an infrarenal fusiform AAA and aortic dissection of proximal SMA. Vascular surgery consulted for AAA.     Patient was transferred from her facility at [facility] due to concern for altered mental status. ***     Patient denies abdominal pain.     Vascular History:  Asymptomatic critical L ICA stenosis identified    PMH:   HTN  Afib   HF  TIA/CVA  Dementia  OA    PSH:   Epigastric hernia repair/reduction     Home Meds:  Medications Ordered Prior to Encounter[1]     Allergies:  RX Allergies[2]    SH/FH: ***    ROS: 12 system negative except HPI    Objective   Vitals:  Heart Rate:  [54-71]   Temperature:  [37 °C (98.6 °F)]   Respirations:  [16-18]   BP: ()/()   Weight:  [50 kg (110 lb 3.7 oz)]   Pulse Ox:  [87 %-99 %]     Exam:  Constitutional: No acute distress, resting comfortably  Neuro:  ***   ENMT: moist mucous membranes  Head/neck: atraumatic  CV: no tachycardia  Pulm: non-labored on room air  GI: soft, non-tender, non-distended  Skin: warm and dry  Musculoskeletal: moving all extremities  Extremities: Palpable femoral, DP, PT bilaterally.      Labs:  Results from last 7 days   Lab Units 05/15/25  1847   WBC AUTO x10*3/uL 3.8*   HEMOGLOBIN g/dL 10.3*   PLATELETS AUTO x10*3/uL 77*      Results from last 7 days   Lab Units 05/15/25  1847   SODIUM mmol/L 145   POTASSIUM mmol/L 3.5   CHLORIDE mmol/L 115*   CO2 mmol/L 24   BUN mg/dL 24*   CREATININE mg/dL 0.99   GLUCOSE mg/dL 83      Results from last 7 days   Lab Units 05/16/25  0046   APTT seconds 43*     Results from last 7 days   Lab Units 05/16/25  0046   ANTI XA UNFRACTIONATED IU/mL 1.6*       Imaging:  Reviewed independently by vascular team:  XR humerus right  Result Date: 5/16/2025  No evidence of acute osseous abnormality involving the right humerus.     MACRO: None   Signed by: Adin Pickett 5/16/2025 2:25 AM Dictation workstation:   [id number]    CT chest abdomen pelvis w IV contrast  Addendum Date: 5/16/2025  Interpreted By:  Adin Pickett, ADDENDUM: There is no dissection of the aorta or superior mesenteric artery. There is a least moderate narrowing of the celiac axis and SMA on the basis of extensive athero sclerotic plaque, as mentioned under findings; point 2 of the impression in the original report is erroneous.   Signed by: Adin Pickett 5/16/2025 2:24 AM   -------- ORIGINAL REPORT -------- Dictation workstation:   [id number]    Result Date: 5/16/2025  1. Severe calcified/noncalcified atherosclerotic disease throughout the thoracic and abdominal aorta with diffuse intraluminal irregularity and areas of penetrating ulcers seen within the distal thoracic and upper abdominal aorta as described above. There is a infrarenal fusiform abdominal aortic aneurysm just superior to the bifurcation measuring 4.0 x 3.0 cm which is new since CT 09/10/2009. No evidence of abdominal aortic rupture. 2. 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 3. Circumscribed arterial enhancing lesion in the upper pole of the right kidney measuring 2.0 x 2.0 cm  concerning for primary renal malignancy/renal cell carcinoma. Recommend urologic follow-up. 4. End-stage glenohumeral joint osteoarthrosis with subchondral collapse. Moderate to severe left glenohumeral joint degenerative changes. 5. Cardiomegaly with reflux of contrast into the hepatic veins, which may reflect a component of right heart failure. Small pericardial effusion. Mild coronary artery disease. 6. There is interlobular septal thickening, suggestive of mild pulmonary edema. 7. Small bilateral pleural effusions with bibasilar atelectasis 8. Cholelithiasis and choledocholithiasis. No acute cholecystitis. 9. Circumferential wall thickening of the esophagus, suggestive of underlying esophagitis. . 10. Mild vascular calcification of the aortic valve, recommend correlating for aortic stenosis. 11. Circumferential wall thickening of the esophagus, suggestive of underlying esophagitis. 12. Colonic diverticulosis without acute diverticulitis. 13. Multi-cystic atrophic kidneys.     I personally reviewed the images/study and I agree with radiology resident Dr. Denny Villegas findings as stated. This study was interpreted at [place], [place]   MACRO: Denny Villegas discussed the significance and urgency of this critical finding via [employer] secure chat with direct feedback with  Rita Alvarado MD on 5/16/2025 at 12:14 am.  (**-RCF-**) Findings:  See findings.   Signed by: Adin Pickett 5/16/2025 12:33 AM Dictation workstation:   [id number]    CT angio neck  Result Date: 5/16/2025  Severe calcifications of the aortic arch. Moderate to severe narrowing of the right subclavian artery proximal and distal to the vertebral artery origin. Mild-to-moderate narrowing of the origin of the left subclavian artery.   Right carotid vessels: Moderate atherosclerosis of the common carotid artery with mild distal stenosis.  Severe atherosclerotic disease of the bifurcation and proximal cervical internal  carotid artery with at least 50-60 % stenosis by NASCET criteria. Mild atherosclerotic calcifications of the intracranial internal carotid artery. The ECA demonstrates focal occlusion or high-grade narrowing proximally with reconstitution about the level of the angle of the mandible.   Left carotid vessels: Mild-to-moderate focal narrowing of the origin of the common carotid artery (series 402, images 81-85). Mild-to-moderate partially calcified plaque in the mid to distal common carotid artery without hemodynamically significant luminal narrowing. Moderate plaque about the bifurcation of the proximal ICA with 50-60% luminal narrowing of the postbulbar ICA by NASCET criteria. The more distal cervical ICA demonstrates mild-to-moderate plaque without hemodynamically significant luminal narrowing.   Vertebral vessels: Moderate narrowing of the V1 segment of the left vertebral artery. Mild plaque in the more distal cervical vertebral arteries without hemodynamically significant luminal narrowing.   No evidence of dissection or pseudoaneurysm.   Partially visualized interlobular septal thickening suggesting acute pulmonary interstitial and small left pleural effusion.   Incidentally noted air in the jugular veins likely introduced during IV catheterization and probably of nominal clinical significance. I personally reviewed the images/study and I agree with radiology resident Dr. Denny Villegas findings as stated. This study was interpreted at [place], [place]   MACRO: None   Signed by: Adin Pickett 5/16/2025 1:50 AM Dictation workstation:   [id number]       [1]   No current facility-administered medications on file prior to encounter.     Current Outpatient Medications on File Prior to Encounter   Medication Sig Dispense Refill    acetaminophen (Tylenol) 500 mg tablet Take 2 tablets (1,000 mg) by mouth 3 times a day. For pain      apixaban (Eliquis) 2.5 mg tablet Take 1 tablet  (2.5 mg) by mouth 2 times a day.      atorvastatin (Lipitor) 40 mg tablet Take 1 tablet (40 mg) by mouth once daily at bedtime.      brimonidine (AlphaGAN) 0.2 % ophthalmic solution Administer 1 drop into the left eye twice a day.      cholecalciferol (Vitamin D-3) 50 mcg (2,000 units) tablet Take 1 tablet (50 mcg) by mouth once daily.      diclofenac sodium (Voltaren) 1 % gel Apply 2.25 inches (2 g) topically 2 times a day. To affected joint(s)      dorzolamide-timoloL (Cosopt) 22.3-6.8 mg/mL ophthalmic solution Administer 1 drop into the left eye 2 times a day.      furosemide (Lasix) 20 mg tablet Take 1 tablet (20 mg) by mouth once daily.      ipratropium-albuteroL (Duo-Neb) 0.5-2.5 mg/3 mL nebulizer solution Take 3 mL by nebulization every 8 hours.      ipratropium-albuteroL (Duo-Neb) 0.5-2.5 mg/3 mL nebulizer solution Take 3 mL by nebulization every 8 hours if needed for wheezing.      latanoprost (Xalatan) 0.005 % ophthalmic solution Administer 1 drop into the left eye once daily at bedtime.      lisinopril 10 mg tablet Take 1 tablet (10 mg) by mouth once daily.      loratadine (Claritin) 10 mg tablet Take 1 tablet (10 mg) by mouth every other day.      metoprolol succinate XL (Toprol-XL) 100 mg 24 hr tablet Take 1 tablet (100 mg) by mouth once daily.      mirtazapine (Remeron) 7.5 mg tablet Take 1 tablet (7.5 mg) by mouth once daily at bedtime.      ondansetron (Zofran) 4 mg tablet Take 1 tablet (4 mg) by mouth every 8 hours if needed for nausea or vomiting.      potassium chloride ER (Micro-K) 10 mEq ER capsule Take 1 capsule (10 mEq) by mouth once daily.      sertraline (Zoloft) 25 mg tablet Take 1 tablet (25 mg) by mouth once daily.      vit C/E/zinc/lutein/zeaxanthin (OCUVITE EYE HEALTH ORAL) Take 1 tablet by mouth once daily. Ocuvite Eye + Multi Oral Tablet      [DISCONTINUED] Eliquis 2.5 mg tablet Take 1 tablet (2.5 mg) by mouth 2 times a day.      [DISCONTINUED] ZEAXANTHIN, BULK, MISC Take 1 tablet by  mouth once daily.     [2]   Allergies  Allergen Reactions    Celecoxib Unknown

## 2025-05-16 NOTE — CARE PLAN
The patient's goals for the shift include patietn will be HDS during the shift    The clinical goals for the shift include patient will be freee from falls during the shift    Problem: Safety - Medical Restraint  Goal: Free from restraint(s) (Restraint for Interference with Medical Device)  Outcome: Progressing     Problem: Pain - Adult  Goal: Verbalizes/displays adequate comfort level or baseline comfort level  Outcome: Progressing     Problem: Safety - Adult  Goal: Free from fall injury  Outcome: Progressing     Problem: Skin  Goal: Prevent/minimize sheer/friction injuries  Outcome: Progressing  Flowsheets (Taken 5/16/2025 1900)  Prevent/minimize sheer/friction injuries: Use pull sheet

## 2025-05-17 LAB
ANION GAP SERPL CALC-SCNC: 16 MMOL/L (ref 10–20)
BUN SERPL-MCNC: 30 MG/DL (ref 6–23)
CALCIUM SERPL-MCNC: 9.6 MG/DL (ref 8.6–10.6)
CHLORIDE SERPL-SCNC: 106 MMOL/L (ref 98–107)
CO2 SERPL-SCNC: 26 MMOL/L (ref 21–32)
CREAT SERPL-MCNC: 1.71 MG/DL (ref 0.5–1.05)
EGFRCR SERPLBLD CKD-EPI 2021: 27 ML/MIN/1.73M*2
GLUCOSE BLD MANUAL STRIP-MCNC: 121 MG/DL (ref 74–99)
GLUCOSE BLD MANUAL STRIP-MCNC: 140 MG/DL (ref 74–99)
GLUCOSE SERPL-MCNC: 80 MG/DL (ref 74–99)
MAGNESIUM SERPL-MCNC: 1.98 MG/DL (ref 1.6–2.4)
POTASSIUM SERPL-SCNC: 3.6 MMOL/L (ref 3.5–5.3)
SODIUM SERPL-SCNC: 144 MMOL/L (ref 136–145)

## 2025-05-17 PROCEDURE — 2500000001 HC RX 250 WO HCPCS SELF ADMINISTERED DRUGS (ALT 637 FOR MEDICARE OP): Performed by: STUDENT IN AN ORGANIZED HEALTH CARE EDUCATION/TRAINING PROGRAM

## 2025-05-17 PROCEDURE — 80048 BASIC METABOLIC PNL TOTAL CA: CPT | Performed by: INTERNAL MEDICINE

## 2025-05-17 PROCEDURE — 2500000002 HC RX 250 W HCPCS SELF ADMINISTERED DRUGS (ALT 637 FOR MEDICARE OP, ALT 636 FOR OP/ED): Performed by: STUDENT IN AN ORGANIZED HEALTH CARE EDUCATION/TRAINING PROGRAM

## 2025-05-17 PROCEDURE — 83735 ASSAY OF MAGNESIUM: CPT | Performed by: INTERNAL MEDICINE

## 2025-05-17 PROCEDURE — 99232 SBSQ HOSP IP/OBS MODERATE 35: CPT | Performed by: INTERNAL MEDICINE

## 2025-05-17 PROCEDURE — 36415 COLL VENOUS BLD VENIPUNCTURE: CPT | Performed by: INTERNAL MEDICINE

## 2025-05-17 PROCEDURE — 1200000002 HC GENERAL ROOM WITH TELEMETRY DAILY

## 2025-05-17 PROCEDURE — 82947 ASSAY GLUCOSE BLOOD QUANT: CPT

## 2025-05-17 PROCEDURE — 2500000004 HC RX 250 GENERAL PHARMACY W/ HCPCS (ALT 636 FOR OP/ED): Mod: JZ | Performed by: STUDENT IN AN ORGANIZED HEALTH CARE EDUCATION/TRAINING PROGRAM

## 2025-05-17 RX ADMIN — SENNOSIDES AND DOCUSATE SODIUM 2 TABLET: 50; 8.6 TABLET ORAL at 20:55

## 2025-05-17 RX ADMIN — APIXABAN 2.5 MG: 2.5 TABLET, FILM COATED ORAL at 10:24

## 2025-05-17 RX ADMIN — DORZOLAMIDE HYDROCHLORIDE AND TIMOLOL MALEATE 1 DROP: 20; 5 SOLUTION OPHTHALMIC at 09:20

## 2025-05-17 RX ADMIN — DORZOLAMIDE HYDROCHLORIDE AND TIMOLOL MALEATE 1 DROP: 20; 5 SOLUTION OPHTHALMIC at 20:54

## 2025-05-17 RX ADMIN — METOPROLOL SUCCINATE 100 MG: 100 TABLET, EXTENDED RELEASE ORAL at 10:23

## 2025-05-17 RX ADMIN — Medication 50 MCG: at 10:23

## 2025-05-17 RX ADMIN — APIXABAN 2.5 MG: 2.5 TABLET, FILM COATED ORAL at 20:55

## 2025-05-17 RX ADMIN — SALINE NASAL SPRAY 1 SPRAY: 1.5 SOLUTION NASAL at 20:53

## 2025-05-17 RX ADMIN — BRIMONIDINE TARTRATE 1 DROP: 2 SOLUTION/ DROPS OPHTHALMIC at 10:26

## 2025-05-17 RX ADMIN — MIRTAZAPINE 7.5 MG: 15 TABLET, FILM COATED ORAL at 20:54

## 2025-05-17 RX ADMIN — SALINE NASAL SPRAY 1 SPRAY: 1.5 SOLUTION NASAL at 10:24

## 2025-05-17 RX ADMIN — LISINOPRIL 10 MG: 20 TABLET ORAL at 10:23

## 2025-05-17 RX ADMIN — CEFTRIAXONE SODIUM 2 G: 2 INJECTION, SOLUTION INTRAVENOUS at 21:04

## 2025-05-17 RX ADMIN — SERTRALINE HYDROCHLORIDE 25 MG: 50 TABLET ORAL at 09:20

## 2025-05-17 RX ADMIN — SENNOSIDES AND DOCUSATE SODIUM 2 TABLET: 50; 8.6 TABLET ORAL at 10:23

## 2025-05-17 RX ADMIN — ATORVASTATIN CALCIUM 40 MG: 40 TABLET, FILM COATED ORAL at 20:55

## 2025-05-17 RX ADMIN — LATANOPROST 1 DROP: 50 SOLUTION OPHTHALMIC at 20:54

## 2025-05-17 RX ADMIN — BRIMONIDINE TARTRATE 1 DROP: 2 SOLUTION/ DROPS OPHTHALMIC at 20:54

## 2025-05-17 ASSESSMENT — COGNITIVE AND FUNCTIONAL STATUS - GENERAL
STANDING UP FROM CHAIR USING ARMS: A LOT
MOVING TO AND FROM BED TO CHAIR: A LOT
EATING MEALS: A LOT
HELP NEEDED FOR BATHING: A LOT
WALKING IN HOSPITAL ROOM: A LOT
CLIMB 3 TO 5 STEPS WITH RAILING: A LOT
DRESSING REGULAR UPPER BODY CLOTHING: A LOT
MOBILITY SCORE: 12
DAILY ACTIVITIY SCORE: 12
MOVING FROM LYING ON BACK TO SITTING ON SIDE OF FLAT BED WITH BEDRAILS: A LOT
DRESSING REGULAR LOWER BODY CLOTHING: A LOT
PERSONAL GROOMING: A LOT
TOILETING: A LOT
TURNING FROM BACK TO SIDE WHILE IN FLAT BAD: A LOT

## 2025-05-17 NOTE — PROGRESS NOTES
05/17/25 1154   Discharge Planning   Home or Post Acute Services Post acute facilities (Rehab/SNF/etc)   Type of Post Acute Facility Services Skilled nursing   Expected Discharge Disposition SNF  ([facility])   Does the patient need discharge transport arranged? Yes   RoundTrip coordination needed? Yes   Has discharge transport been arranged? Yes   What day is the transport expected? 05/18/25   What time is the transport expected? 1200   Patient Choice   Patient / Family choosing to utilize agency / facility established prior to hospitalization Yes     Transitional Care Coordinator Discharge Planning Note:  Patient medically ready for discharge : 5/18  Patient received auth to Admit to: [facility]  RN report #: [phone]  TCC request transport for: 12  TCC called patient Son left a vm regarding discharge    The S Vehicle you requested for Susan CARRENOMarta in unit/room [place] [address] on 05/18/2025 is scheduled to arrive at 12:30pm EDT! [facility] is handling this ride and you can contact them at [phone].    HAL SchultzN-RN  Transitional Care Coordinator (TCC)  [phone] ext [phone]

## 2025-05-17 NOTE — PROGRESS NOTES
Susan Patiño is a 89 y.o. female on day 1 of admission presenting with Altered mental status, unspecified altered mental status type.    Subjective   Lying in bed. No distress.  Patient appears more alert and interactive today compared to yesterday.  Denies having any abdominal pain.  Denies any current dyspnea.  States that overall she feels better.    Objective     General: Lying in bed without distress.  Frail-appearing.  Cooperative.  Skin: No rashes or ulcerations.  HEENT: Sclera is white.  Mucous membranes moist.  Neck: Supple.  No JVD.  Cardiac: Regular rate and rhythm, S1/S2 normal.  Lungs: Clear to auscultation bilaterally, no wheezing or crackles, no accessory muscle use at rest.  Abdomen: Soft, nontender, nondistended, BS +  Extremities: No cyanosis.  No lower extremity edema.  Neurologic: Alert, responsive and answering questions today, oriented to self, place, partially time.  No focal deficits.  Psychiatric: Appropriate mood and behavior.  Currently no agitation.    Last Recorded Vitals  Blood pressure 105/86, pulse 60, temperature 36.4 °C (97.5 °F), resp. rate 17, weight 50 kg (110 lb 3.7 oz), SpO2 98%.  On 2 L O2 nasal cannula.    Intake/Output last 3 Shifts:  I/O last 3 completed shifts:  In: 97.5 (2 mL/kg) [I.V.:47.5 (1 mL/kg); IV Piggyback:50]  Out: 300 (6 mL/kg) [Urine:300 (0.2 mL/kg/hr)]  Weight: 50 kg     Relevant Results  Scheduled Medications[1]   Continuous Medications[2]   PRN Medications[3]     Results for orders placed or performed during the hospital encounter of 05/15/25 (from the past 24 hours)   POCT GLUCOSE   Result Value Ref Range    POCT Glucose 89 74 - 99 mg/dL   Basic Metabolic Panel   Result Value Ref Range    Glucose 80 74 - 99 mg/dL    Sodium 144 136 - 145 mmol/L    Potassium 3.6 3.5 - 5.3 mmol/L    Chloride 106 98 - 107 mmol/L    Bicarbonate 26 21 - 32 mmol/L    Anion Gap 16 10 - 20 mmol/L    Urea Nitrogen 30 (H) 6 - 23 mg/dL    Creatinine 1.71 (H) 0.50 - 1.05 mg/dL     eGFR 27 (L) >60 mL/min/1.73m*2    Calcium 9.6 8.6 - 10.6 mg/dL   Magnesium   Result Value Ref Range    Magnesium 1.98 1.60 - 2.40 mg/dL      Imaging  XR humerus right  Result Date: 5/16/2025  No evidence of acute osseous abnormality involving the right humerus.     MACRO: None   Signed by: Adin Pickett 5/16/2025 2:25 AM Dictation workstation:   [id number]    CT chest abdomen pelvis w IV contrast  Addendum Date: 5/16/2025  Interpreted By:  Adin Pickett, ADDENDUM: There is no dissection of the aorta or superior mesenteric artery. There is a least moderate narrowing of the celiac axis and SMA on the basis of extensive athero sclerotic plaque, as mentioned under findings; point 2 of the impression in the original report is erroneous.   Signed by: Adin Pickett 5/16/2025 2:24 AM   -------- ORIGINAL REPORT -------- Dictation workstation:   [id number]    Result Date: 5/16/2025  1. Severe calcified/noncalcified atherosclerotic disease throughout the thoracic and abdominal aorta with diffuse intraluminal irregularity and areas of penetrating ulcers seen within the distal thoracic and upper abdominal aorta as described above. There is a infrarenal fusiform abdominal aortic aneurysm just superior to the bifurcation measuring 4.0 x 3.0 cm which is new since CT 09/10/2009. No evidence of abdominal aortic rupture. 2. 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 3. Circumscribed arterial enhancing lesion in the upper pole of the right kidney measuring 2.0 x 2.0 cm concerning for primary renal malignancy/renal cell carcinoma. Recommend urologic follow-up. 4. End-stage glenohumeral joint osteoarthrosis with subchondral collapse. Moderate to severe left glenohumeral joint degenerative changes. 5. Cardiomegaly with reflux of contrast into the hepatic veins, which may reflect a component of right heart failure. Small pericardial effusion. Mild coronary artery disease. 6. There is  interlobular septal thickening, suggestive of mild pulmonary edema. 7. Small bilateral pleural effusions with bibasilar atelectasis 8. Cholelithiasis and choledocholithiasis. No acute cholecystitis. 9. Circumferential wall thickening of the esophagus, suggestive of underlying esophagitis. . 10. Mild vascular calcification of the aortic valve, recommend correlating for aortic stenosis. 11. Circumferential wall thickening of the esophagus, suggestive of underlying esophagitis. 12. Colonic diverticulosis without acute diverticulitis. 13. Multi-cystic atrophic kidneys.     I personally reviewed the images/study and I agree with radiology resident Dr. Denny Villegas findings as stated. This study was interpreted at [place], [place]   MACRO: Denny Villegas discussed the significance and urgency of this critical finding via [employer] secure chat with direct feedback with  Rita Alvarado MD on 5/16/2025 at 12:14 am.  (**-RCF-**) Findings:  See findings.   Signed by: Adin Pickett 5/16/2025 12:33 AM Dictation workstation:   [id number]    CT angio neck  Result Date: 5/16/2025  Severe calcifications of the aortic arch. Moderate to severe narrowing of the right subclavian artery proximal and distal to the vertebral artery origin. Mild-to-moderate narrowing of the origin of the left subclavian artery.   Right carotid vessels: Moderate atherosclerosis of the common carotid artery with mild distal stenosis.  Severe atherosclerotic disease of the bifurcation and proximal cervical internal carotid artery with at least 50-60 % stenosis by NASCET criteria. Mild atherosclerotic calcifications of the intracranial internal carotid artery. The ECA demonstrates focal occlusion or high-grade narrowing proximally with reconstitution about the level of the angle of the mandible.   Left carotid vessels: Mild-to-moderate focal narrowing of the origin of the common carotid artery (series 402, images 81-85).  Mild-to-moderate partially calcified plaque in the mid to distal common carotid artery without hemodynamically significant luminal narrowing. Moderate plaque about the bifurcation of the proximal ICA with 50-60% luminal narrowing of the postbulbar ICA by NASCET criteria. The more distal cervical ICA demonstrates mild-to-moderate plaque without hemodynamically significant luminal narrowing.   Vertebral vessels: Moderate narrowing of the V1 segment of the left vertebral artery. Mild plaque in the more distal cervical vertebral arteries without hemodynamically significant luminal narrowing.   No evidence of dissection or pseudoaneurysm.   Partially visualized interlobular septal thickening suggesting acute pulmonary interstitial and small left pleural effusion.   Incidentally noted air in the jugular veins likely introduced during IV catheterization and probably of nominal clinical significance. I personally reviewed the images/study and I agree with radiology resident Dr. Denny Villegas findings as stated. This study was interpreted at [place], [place]   MACRO: None   Signed by: Adin Pickett 5/16/2025 1:50 AM Dictation workstation:   [id number]    CT head wo IV contrast  Result Date: 5/15/2025  No evidence of acute intracranial abnormality. Chronic ischemic changes.   Left mastoid and middle ear effusions; correlate clinically with any concern for acute otomastoiditis.   I personally reviewed the images/study and I agree with radiology resident Dr. Denny Villegas findings as stated. This study was interpreted at [place], [place]   MACRO: None   Signed by: Adin Pickett 5/15/2025 8:56 PM Dictation workstation:   [id number]    XR chest 2 views  Result Date: 5/15/2025  1. New interstitial prominence with peribronchial cuffing and Kerley B-lines compatible with acute pulmonary interstitial edema/CHF. Bibasilar airspace opacities,  probably atelectasis. Small pleural effusions. No pneumothorax.     I personally reviewed the images/study and I agree with radiology resident Dr. Denny Villegas findings as stated. This study was interpreted at [place], [place]   MACRO: None   Signed by: Adin Pickett 5/15/2025 8:51 PM Dictation workstation:   [id number]    XR hip left with pelvis when performed 2 or 3 views  Result Date: 5/15/2025  1. No acute fracture or malalignment of the pelvis or left hip.     I personally reviewed the images/study and I agree with radiology resident Dr. Denny Villegas findings as stated. This study was interpreted at [place], [place]   MACRO: None   Signed by: Adin Pickett 5/15/2025 8:50 PM Dictation workstation:   [id number]      Cardiology, Vascular, and Other Imaging  ECG 12 lead  Result Date: 5/15/2025  Atrial fibrillation with slow ventricular response Anteroseptal infarct (cited on or before 15-MAY-2025) Abnormal ECG When compared with ECG of 31-AUG-2023 00:35, Previous ECG has undetermined rhythm, needs review ST no longer depressed in Inferior leads ST no longer depressed in Anterior leads ST more depressed in Lateral leads T wave inversion no longer evident in Inferior leads Nonspecific T wave abnormality, worse in Lateral leads See ED provider note for full interpretation and clinical correlation Confirmed by Nini Encarnacion ([id number]) on 5/15/2025 11:35:58 PM         Hospital Course:  Susan Patiño is a 89-year-old F with a PMH of paroxysmal A-fib, Alzheimer's disease, CHF, CKD 3B, HTN, MDD, CAD, glaucoma, pulmonary hypertension, osteoarthritis, bilateral carotid artery stenosis, PVD, hyperlipidemia, chronic AAA and dissection, recurrent syncope who presents to [facility] from [facility] with complaints by family of confusion and altered mental status.     Assessment/Plan     Acute metabolic encephalopathy secondary to  acute cystitis  -Patient reports that at baseline patient does have history of some cognitive memory issues but for the most part is alert and usually oriented x 3 but can be forgetful about time.  Usually can recognize family without issues.  -Encephalopathy secondary to infection.  -Patient appears to be showing improvement today, more alert, more responsive and answering questions.     Acute cystitis without hematuria  - Continue IV Rocephin.  - Urine culture no specific growth.    Acute kidney injury  -Mild increase in creatinine.  Baseline around 0.9-1.3 in the past.  -Creatinine increased to 1.71 today, most likely due to diuresis.  -Hold any further diuresis and monitor creatinine.     Acute on chronic diastolic CHF exacerbation  -BNP elevated to the 200s, chest imaging showing pulmonary edema, hypoxia and bilateral lower extremity edema  -Status post Lasix 40 mg IV in the emergency department 5/15 night and another dose of Lasix 20 mg IV x 1 on 5/16  -Hold off on diuresis today.  Creatinine increased.    Chronic hypoxic respiratory failure on home oxygen  -Baseline oxygen is 2 L O2 nasal cannula as needed.  -Patient initially presented with requirement of 6 L O2 nasal cannula.  Now currently down to 2 L O2 nasal cannula.  Continue weaning oxygen as tolerated.    Hypertensive urgency  -Significantly elevated blood pressures on arrival, now fairly well-controlled since we restarted her on her home medications.  -There was concern for acute aortic dissection and she was placed on esmolol and nicardipine drips, this was discontinued as vascular imaging appears chronic.  -Continue home metoprolol succinate 100 mg daily, hold lisinopril 10 mg daily due to increasing creatinine.     CT imaging with AAA and aortic dissection  -Vascular surgery consulted no vascular surgery intervention recommended AAA and aortic dissection appears chronic.     History of paroxysmal A-fib  -Currently rate controlled A-fib.  -Continue  apixaban 2.5 mg twice daily  -Continue home metoprolol succinate 100 mg daily     HLD  - Continue home atorvastatin 40 mg nightly     Glaucoma  - Continue home drops    Disposition: Patient clinically appears to be improving.  Monitor creatinine, most likely due to too much diuresis.  If remains mentally stable and improved, and creatinine remained stable or shows improvement tomorrow we will likely discharge tomorrow.  Waiting to see if we can get her back to her long-term nursing home.  TCC notified.    Paulino Gardner MD           [1] apixaban, 2.5 mg, oral, BID  atorvastatin, 40 mg, oral, Nightly  brimonidine, 1 drop, Left Eye, BID  cefTRIAXone, 2 g, intravenous, q24h  cholecalciferol, 50 mcg, oral, Daily  dorzolamide-timoloL, 1 drop, Left Eye, BID  latanoprost, 1 drop, Left Eye, Nightly  [Held by provider] lisinopril, 10 mg, oral, Daily  metoprolol succinate XL, 100 mg, oral, Daily  mirtazapine, 7.5 mg, oral, Nightly  sennosides-docusate sodium, 2 tablet, oral, BID  sertraline, 25 mg, oral, Daily  sodium chloride, 1 spray, Each Nostril, BID    [2]    [3] PRN medications: acetaminophen **OR** acetaminophen **OR** acetaminophen, diclofenac sodium, ipratropium-albuteroL, labetaloL, OLANZapine zydis **OR** OLANZapine, ondansetron **OR** ondansetron

## 2025-05-17 NOTE — NURSING NOTE
Admission Note:   Patient arrived to unit. Welcome packet given to patient with orientation to room and call light. No concern or pain at this time. Granddaughter bedside during admission.

## 2025-05-18 ENCOUNTER — APPOINTMENT (OUTPATIENT)
Dept: RADIOLOGY | Facility: HOSPITAL | Age: OVER 89
DRG: 291 | End: 2025-05-18
Payer: MEDICARE

## 2025-05-18 VITALS
HEART RATE: 66 BPM | TEMPERATURE: 98.1 F | DIASTOLIC BLOOD PRESSURE: 73 MMHG | SYSTOLIC BLOOD PRESSURE: 113 MMHG | WEIGHT: 110.23 LBS | OXYGEN SATURATION: 93 % | RESPIRATION RATE: 18 BRPM

## 2025-05-18 LAB
ANION GAP SERPL CALC-SCNC: 18 MMOL/L (ref 10–20)
BACTERIA UR CULT: ABNORMAL
BUN SERPL-MCNC: 39 MG/DL (ref 6–23)
CALCIUM SERPL-MCNC: 9.3 MG/DL (ref 8.6–10.6)
CHLORIDE SERPL-SCNC: 106 MMOL/L (ref 98–107)
CO2 SERPL-SCNC: 21 MMOL/L (ref 21–32)
CREAT SERPL-MCNC: 2.97 MG/DL (ref 0.5–1.05)
EGFRCR SERPLBLD CKD-EPI 2021: 14 ML/MIN/1.73M*2
GLUCOSE BLD MANUAL STRIP-MCNC: 151 MG/DL (ref 74–99)
GLUCOSE BLD MANUAL STRIP-MCNC: 156 MG/DL (ref 74–99)
GLUCOSE SERPL-MCNC: 96 MG/DL (ref 74–99)
POTASSIUM SERPL-SCNC: 3.9 MMOL/L (ref 3.5–5.3)
SODIUM SERPL-SCNC: 141 MMOL/L (ref 136–145)

## 2025-05-18 PROCEDURE — 2500000004 HC RX 250 GENERAL PHARMACY W/ HCPCS (ALT 636 FOR OP/ED): Mod: JZ | Performed by: STUDENT IN AN ORGANIZED HEALTH CARE EDUCATION/TRAINING PROGRAM

## 2025-05-18 PROCEDURE — 2500000001 HC RX 250 WO HCPCS SELF ADMINISTERED DRUGS (ALT 637 FOR MEDICARE OP): Performed by: STUDENT IN AN ORGANIZED HEALTH CARE EDUCATION/TRAINING PROGRAM

## 2025-05-18 PROCEDURE — 71045 X-RAY EXAM CHEST 1 VIEW: CPT | Performed by: RADIOLOGY

## 2025-05-18 PROCEDURE — 36415 COLL VENOUS BLD VENIPUNCTURE: CPT | Performed by: INTERNAL MEDICINE

## 2025-05-18 PROCEDURE — 80048 BASIC METABOLIC PNL TOTAL CA: CPT | Performed by: INTERNAL MEDICINE

## 2025-05-18 PROCEDURE — 82947 ASSAY GLUCOSE BLOOD QUANT: CPT

## 2025-05-18 PROCEDURE — 1200000002 HC GENERAL ROOM WITH TELEMETRY DAILY

## 2025-05-18 PROCEDURE — 2500000002 HC RX 250 W HCPCS SELF ADMINISTERED DRUGS (ALT 637 FOR MEDICARE OP, ALT 636 FOR OP/ED): Performed by: STUDENT IN AN ORGANIZED HEALTH CARE EDUCATION/TRAINING PROGRAM

## 2025-05-18 PROCEDURE — 99233 SBSQ HOSP IP/OBS HIGH 50: CPT | Performed by: INTERNAL MEDICINE

## 2025-05-18 PROCEDURE — 71045 X-RAY EXAM CHEST 1 VIEW: CPT

## 2025-05-18 RX ORDER — ACETAMINOPHEN 325 MG/1
650 TABLET ORAL EVERY 4 HOURS PRN
Start: 2025-05-18

## 2025-05-18 RX ORDER — CEPHALEXIN 500 MG/1
500 CAPSULE ORAL 2 TIMES DAILY
Start: 2025-05-18 | End: 2025-05-27 | Stop reason: HOSPADM

## 2025-05-18 RX ADMIN — SENNOSIDES AND DOCUSATE SODIUM 2 TABLET: 50; 8.6 TABLET ORAL at 21:27

## 2025-05-18 RX ADMIN — DORZOLAMIDE HYDROCHLORIDE AND TIMOLOL MALEATE 1 DROP: 20; 5 SOLUTION OPHTHALMIC at 21:27

## 2025-05-18 RX ADMIN — Medication 50 MCG: at 11:01

## 2025-05-18 RX ADMIN — ATORVASTATIN CALCIUM 40 MG: 40 TABLET, FILM COATED ORAL at 21:27

## 2025-05-18 RX ADMIN — SALINE NASAL SPRAY 1 SPRAY: 1.5 SOLUTION NASAL at 21:28

## 2025-05-18 RX ADMIN — LATANOPROST 1 DROP: 50 SOLUTION OPHTHALMIC at 21:27

## 2025-05-18 RX ADMIN — BRIMONIDINE TARTRATE 1 DROP: 2 SOLUTION/ DROPS OPHTHALMIC at 11:02

## 2025-05-18 RX ADMIN — APIXABAN 2.5 MG: 2.5 TABLET, FILM COATED ORAL at 21:27

## 2025-05-18 RX ADMIN — SERTRALINE HYDROCHLORIDE 25 MG: 50 TABLET ORAL at 11:04

## 2025-05-18 RX ADMIN — MIRTAZAPINE 7.5 MG: 15 TABLET, FILM COATED ORAL at 21:27

## 2025-05-18 RX ADMIN — CEFTRIAXONE SODIUM 2 G: 2 INJECTION, SOLUTION INTRAVENOUS at 21:28

## 2025-05-18 RX ADMIN — DORZOLAMIDE HYDROCHLORIDE AND TIMOLOL MALEATE 1 DROP: 20; 5 SOLUTION OPHTHALMIC at 11:02

## 2025-05-18 RX ADMIN — SALINE NASAL SPRAY 1 SPRAY: 1.5 SOLUTION NASAL at 11:02

## 2025-05-18 RX ADMIN — METOPROLOL SUCCINATE 100 MG: 100 TABLET, EXTENDED RELEASE ORAL at 11:02

## 2025-05-18 RX ADMIN — APIXABAN 2.5 MG: 2.5 TABLET, FILM COATED ORAL at 11:02

## 2025-05-18 RX ADMIN — BRIMONIDINE TARTRATE 1 DROP: 2 SOLUTION/ DROPS OPHTHALMIC at 21:27

## 2025-05-18 ASSESSMENT — COGNITIVE AND FUNCTIONAL STATUS - GENERAL
TURNING FROM BACK TO SIDE WHILE IN FLAT BAD: A LITTLE
EATING MEALS: A LITTLE
MOBILITY SCORE: 19
CLIMB 3 TO 5 STEPS WITH RAILING: A LITTLE
MOVING FROM LYING ON BACK TO SITTING ON SIDE OF FLAT BED WITH BEDRAILS: A LITTLE
DAILY ACTIVITIY SCORE: 22
STANDING UP FROM CHAIR USING ARMS: A LITTLE
TOILETING: A LOT
DRESSING REGULAR UPPER BODY CLOTHING: A LOT
PERSONAL GROOMING: A LITTLE
WALKING IN HOSPITAL ROOM: A LITTLE
HELP NEEDED FOR BATHING: A LITTLE
MOVING FROM LYING ON BACK TO SITTING ON SIDE OF FLAT BED WITH BEDRAILS: A LITTLE
MOVING TO AND FROM BED TO CHAIR: A LOT
TOILETING: A LITTLE
PERSONAL GROOMING: A LOT
MOBILITY SCORE: 14
STANDING UP FROM CHAIR USING ARMS: A LOT
DAILY ACTIVITIY SCORE: 15
CLIMB 3 TO 5 STEPS WITH RAILING: A LOT
DRESSING REGULAR LOWER BODY CLOTHING: A LITTLE
WALKING IN HOSPITAL ROOM: A LOT
MOVING TO AND FROM BED TO CHAIR: A LITTLE

## 2025-05-18 ASSESSMENT — PAIN SCALES - WONG BAKER: WONGBAKER_NUMERICALRESPONSE: NO HURT

## 2025-05-18 ASSESSMENT — PAIN SCALES - GENERAL
PAINLEVEL_OUTOF10: 0 - NO PAIN
PAINLEVEL_OUTOF10: 0 - NO PAIN

## 2025-05-18 NOTE — CARE PLAN
The patient's goals for the shift include patietn will be HDS during the shift    The clinical goals for the shift include patient will have adequate nutrition

## 2025-05-18 NOTE — PROGRESS NOTES
Susan Patiño is a 89 y.o. female on day 2 of admission presenting with Altered mental status, unspecified altered mental status type.    Subjective   Patient sitting up in bed.  No distress.  Patient more alert and interactive compared to admission.  Reports no abdominal discomfort.    Objective     General: Lying in bed without distress.  Frail-appearing.  Cooperative.  Skin: No rashes or ulcerations.  HEENT: Sclera is white.  Mucous membranes moist.  Neck: Supple.  No JVD.  Cardiac: Regular rate and rhythm, S1/S2 normal.  Lungs: Clear to auscultation bilaterally, no wheezing or crackles, no accessory muscle use at rest.  Abdomen: Soft, nontender, nondistended, BS +  Extremities: No cyanosis.  No lower extremity edema.  Neurologic: Alert, responsive and answering questions today, oriented to self, place, partially time.  No focal deficits.  Psychiatric: Appropriate mood and behavior.  Currently no agitation.    Last Recorded Vitals  Blood pressure 136/84, pulse 73, temperature 37 °C (98.6 °F), resp. rate 15, weight 50 kg (110 lb 3.7 oz), SpO2 94%.  On 1 L O2 nasal cannula.    Intake/Output last 3 Shifts:  I/O last 3 completed shifts:  In: 447.5 (9 mL/kg) [P.O.:300; I.V.:47.5 (1 mL/kg); IV Piggyback:100]  Out: 600 (12 mL/kg) [Urine:600 (0.3 mL/kg/hr)]  Weight: 50 kg     Relevant Results  Scheduled Medications[1]   Continuous Medications[2]   PRN Medications[3]     Results for orders placed or performed during the hospital encounter of 05/15/25 (from the past 24 hours)   POCT GLUCOSE   Result Value Ref Range    POCT Glucose 140 (H) 74 - 99 mg/dL   POCT GLUCOSE   Result Value Ref Range    POCT Glucose 121 (H) 74 - 99 mg/dL   Basic Metabolic Panel   Result Value Ref Range    Glucose 96 74 - 99 mg/dL    Sodium 141 136 - 145 mmol/L    Potassium 3.9 3.5 - 5.3 mmol/L    Chloride 106 98 - 107 mmol/L    Bicarbonate 21 21 - 32 mmol/L    Anion Gap 18 10 - 20 mmol/L    Urea Nitrogen 39 (H) 6 - 23 mg/dL    Creatinine 2.97  (H) 0.50 - 1.05 mg/dL    eGFR 14 (L) >60 mL/min/1.73m*2    Calcium 9.3 8.6 - 10.6 mg/dL      Hospital Course:  Susan Patiño is a 89-year-old F with a PMH of paroxysmal A-fib, Alzheimer's disease, CHF, CKD 3B, HTN, MDD, CAD, glaucoma, pulmonary hypertension, osteoarthritis, bilateral carotid artery stenosis, PVD, hyperlipidemia, chronic AAA and dissection, recurrent syncope who presents to [facility] from [facility] with complaints by family of confusion and altered mental status.     Assessment/Plan     Acute metabolic encephalopathy secondary to acute cystitis  -Patient reports that at baseline patient does have history of some cognitive memory issues but for the most part is alert and usually oriented x 3 but can be forgetful about time.  Usually can recognize family without issues.  -Encephalopathy secondary to infection.  -Patient continues to be showing improvement today, more alert, more responsive and answering questions.     Acute cystitis without hematuria  -Continue IV Rocephin.  -Urine culture from 5/15 growing Klebsiella pneumonia, only resistant to ampicillin and nitrofurantoin, sensitive to everything else.  Will discharge likely on Keflex.  Total planned duration 7 days treatment.    Acute kidney injury  -Mild increase in creatinine.  Baseline around 0.9-1.3 in the past.  -Creatinine increased to further to 2.97 today, will need to monitor.  - Likely in the setting of diuresis.     Acute on chronic diastolic CHF exacerbation  -BNP elevated to the 200s, chest imaging showing pulmonary edema, hypoxia and bilateral lower extremity edema  -Status post Lasix 40 mg IV in the emergency department 5/15 night and another dose of Lasix 20 mg IV x 1 on 5/16  - Continue holding diuresis, last diuresis 5/16.    Chronic hypoxic respiratory failure on home oxygen  -Baseline oxygen is 2 L O2 nasal cannula as needed.  -Patient initially presented with requirement of 6 L O2 nasal cannula.  Now currently  down to 1 L O2 nasal cannula.  Continue weaning oxygen as tolerated.    Hypertensive urgency  -Significantly elevated blood pressures on arrival, now fairly well-controlled since we restarted her on her home medications.  -There was concern for acute aortic dissection and she was placed on esmolol and nicardipine drips, this was discontinued as vascular imaging appears chronic.  -Continue home metoprolol succinate 100 mg daily, hold lisinopril 10 mg daily due to increasing creatinine.  -Systolic blood pressure 120s to 130s.     CT imaging with AAA and aortic dissection  -Vascular surgery consulted no vascular surgery intervention recommended AAA and aortic dissection appears chronic.     History of paroxysmal A-fib  -Currently rate controlled A-fib.  -Continue apixaban 2.5 mg twice daily  -Continue home metoprolol succinate 100 mg daily     HLD  - Continue home atorvastatin 40 mg nightly     Glaucoma  - Continue home drops    Disposition: Patient clinically appears to be improving.  Monitor creatinine, most likely due to too much diuresis.      Paulino Gardner MD           [1] apixaban, 2.5 mg, oral, BID  atorvastatin, 40 mg, oral, Nightly  brimonidine, 1 drop, Left Eye, BID  cefTRIAXone, 2 g, intravenous, q24h  cholecalciferol, 50 mcg, oral, Daily  dorzolamide-timoloL, 1 drop, Left Eye, BID  latanoprost, 1 drop, Left Eye, Nightly  [Held by provider] lisinopril, 10 mg, oral, Daily  metoprolol succinate XL, 100 mg, oral, Daily  mirtazapine, 7.5 mg, oral, Nightly  sennosides-docusate sodium, 2 tablet, oral, BID  sertraline, 25 mg, oral, Daily  sodium chloride, 1 spray, Each Nostril, BID     [2]    [3] PRN medications: acetaminophen **OR** acetaminophen **OR** acetaminophen, diclofenac sodium, ipratropium-albuteroL, labetaloL, OLANZapine zydis **OR** OLANZapine, ondansetron **OR** ondansetron

## 2025-05-18 NOTE — CARE PLAN
The patient's goals for the shift include patietn will be HDS during the shift    The clinical goals for the shift include Patient will be free from fall/injury during the shift.    Problem: Safety - Medical Restraint  Goal: Remains free of injury from restraints (Restraint for Interference with Medical Device)  Outcome: Progressing  Goal: Free from restraint(s) (Restraint for Interference with Medical Device)  Outcome: Progressing     Problem: Pain - Adult  Goal: Verbalizes/displays adequate comfort level or baseline comfort level  Outcome: Progressing     Problem: Safety - Adult  Goal: Free from fall injury  Outcome: Progressing     Problem: Discharge Planning  Goal: Discharge to home or other facility with appropriate resources  Outcome: Progressing     Problem: Chronic Conditions and Co-morbidities  Goal: Patient's chronic conditions and co-morbidity symptoms are monitored and maintained or improved  Outcome: Progressing     Problem: Skin  Goal: Decreased wound size/increased tissue granulation at next dressing change  Flowsheets (Taken 5/18/2025 0021)  Decreased wound size/increased tissue granulation at next dressing change: Protective dressings over bony prominences  Goal: Participates in plan/prevention/treatment measures  Flowsheets (Taken 5/18/2025 0021)  Participates in plan/prevention/treatment measures: Elevate heels  Goal: Prevent/manage excess moisture  Flowsheets (Taken 5/18/2025 0021)  Prevent/manage excess moisture:   Monitor for/manage infection if present   Moisturize dry skin   Cleanse incontinence/protect with barrier cream  Goal: Prevent/minimize sheer/friction injuries  Flowsheets (Taken 5/18/2025 0021)  Prevent/minimize sheer/friction injuries:   HOB 30 degrees or less   Turn/reposition every 2 hours/use positioning/transfer devices  Goal: Promote/optimize nutrition  Flowsheets (Taken 5/18/2025 0021)  Promote/optimize nutrition:   Offer water/supplements/favorite foods   Monitor/record  intake including meals

## 2025-05-18 NOTE — PROGRESS NOTES
Susan Patiño is a 89 y.o. female on day 2 of admission presenting with Altered mental status, unspecified altered mental status type.    TCC received notification patient no longer medically ready due to worsening kidney function.  TCC canceled transport and notified the facility.    TCC will continue to follow and update the plan as warranted.     HAL SchultzN-RN  Transitional Care Coordinator (TCC)  [phone] ext [phone]

## 2025-05-18 NOTE — SIGNIFICANT EVENT
Rapid Response Nurse Note: RADAR alert: 8    Pager time: 53   Arrival time: 59  Event end time: 115  Location: [facility]  [x] Triage by phone or secure messaging    Rapid response initiated by:  [] Rapid response RN [] Family [] Nursing Supervisor [] Physician   [x] RADAR auto page [] Sepsis auto-page [] RN [] RT   [] NP/PA [] Other:         RADAR VS: T 36.2 °C; HR 76; RR 17; BP 83/60; SPO2 91% 2L via NC        Interventions:  [x] None [] ABG/VBG [] Assist w/ICU transfer [] BAT paged    [] Bag mask [] Blood [] Cardioversion [] Code Blue   [] Code blue for intubation [] Code status changed [] Chest x-ray [] EKG   [] IV fluid/bolus [] KUB x-ray [] Labs/cultures [] Medication   [] Nebulizer treatment [] NIPPV (CPAP/BiPAP) [] Oxygen [] Oral airway   [] Peripheral IV [] Palliative care consult [] CT/MRI [] Sepsis protocol    [] Suctioned [] Other:     Outcome:  [] Coded and  [] Code blue for intubation [] Coded and transferred to ICU []  on division   [x] Remained on division  [] Remained on division + additional monitoring [] Remained in ED [] Transferred to ED   [] Transferred to ICU [] Transferred to inpatient status [] Transferred for interventions (procedure) [] Transferred to ICU stepdown    [] Transferred to surgery [] Transferred to telemetry [] Sepsis protocol [] STEMI protocol   [] Stroke protocol [x] Bedside nurse instructed to page rapid response for any concerns or acute change in condition/VS     Additional Comments: Reviewed above RADAR VS with bedside RN via [email or web address] Secure Chat.  RN reassessed /76. MD notified of low SpO2 and BP (Dr. Steinberg); no new orders received at this time. No interventions by rapid response team anticipated at this time. Division RN/LPN notified of RADAR auto page indicating abnormal vital signs, and assistance was offered. Staff to page rapid response for any concerns or acute change in condition or VS.

## 2025-05-19 ENCOUNTER — APPOINTMENT (OUTPATIENT)
Dept: RADIOLOGY | Facility: HOSPITAL | Age: OVER 89
End: 2025-05-19
Payer: MEDICARE

## 2025-05-19 LAB
ANION GAP SERPL CALC-SCNC: 16 MMOL/L (ref 10–20)
BUN SERPL-MCNC: 51 MG/DL (ref 6–23)
CALCIUM SERPL-MCNC: 9.2 MG/DL (ref 8.6–10.6)
CHLORIDE SERPL-SCNC: 104 MMOL/L (ref 98–107)
CHLORIDE UR-SCNC: 34 MMOL/L
CHLORIDE/CREATININE (MMOL/G) IN URINE: 25 MMOL/G CREAT (ref 38–318)
CO2 SERPL-SCNC: 25 MMOL/L (ref 21–32)
CREAT SERPL-MCNC: 4.57 MG/DL (ref 0.5–1.05)
CREAT UR-MCNC: 134.5 MG/DL (ref 20–320)
EGFRCR SERPLBLD CKD-EPI 2021: 8 ML/MIN/1.73M*2
GLUCOSE SERPL-MCNC: 106 MG/DL (ref 74–99)
POTASSIUM SERPL-SCNC: 4 MMOL/L (ref 3.5–5.3)
POTASSIUM UR-SCNC: 68 MMOL/L
POTASSIUM/CREAT UR-RTO: 51 MMOL/G CREAT
SODIUM SERPL-SCNC: 141 MMOL/L (ref 136–145)
SODIUM UR-SCNC: 31 MMOL/L
SODIUM/CREAT UR-RTO: 23 MMOL/G CREAT

## 2025-05-19 PROCEDURE — 82436 ASSAY OF URINE CHLORIDE: CPT | Performed by: INTERNAL MEDICINE

## 2025-05-19 PROCEDURE — 1210000001 HC SEMI-PRIVATE ROOM DAILY

## 2025-05-19 PROCEDURE — 2500000004 HC RX 250 GENERAL PHARMACY W/ HCPCS (ALT 636 FOR OP/ED): Mod: JZ | Performed by: STUDENT IN AN ORGANIZED HEALTH CARE EDUCATION/TRAINING PROGRAM

## 2025-05-19 PROCEDURE — 36415 COLL VENOUS BLD VENIPUNCTURE: CPT | Performed by: INTERNAL MEDICINE

## 2025-05-19 PROCEDURE — 80048 BASIC METABOLIC PNL TOTAL CA: CPT | Performed by: INTERNAL MEDICINE

## 2025-05-19 PROCEDURE — 99222 1ST HOSP IP/OBS MODERATE 55: CPT | Performed by: INTERNAL MEDICINE

## 2025-05-19 PROCEDURE — 51701 INSERT BLADDER CATHETER: CPT

## 2025-05-19 PROCEDURE — 76770 US EXAM ABDO BACK WALL COMP: CPT | Performed by: RADIOLOGY

## 2025-05-19 PROCEDURE — 99233 SBSQ HOSP IP/OBS HIGH 50: CPT | Performed by: INTERNAL MEDICINE

## 2025-05-19 PROCEDURE — 2500000001 HC RX 250 WO HCPCS SELF ADMINISTERED DRUGS (ALT 637 FOR MEDICARE OP): Performed by: STUDENT IN AN ORGANIZED HEALTH CARE EDUCATION/TRAINING PROGRAM

## 2025-05-19 PROCEDURE — 2500000002 HC RX 250 W HCPCS SELF ADMINISTERED DRUGS (ALT 637 FOR MEDICARE OP, ALT 636 FOR OP/ED): Performed by: STUDENT IN AN ORGANIZED HEALTH CARE EDUCATION/TRAINING PROGRAM

## 2025-05-19 PROCEDURE — 76770 US EXAM ABDO BACK WALL COMP: CPT

## 2025-05-19 RX ADMIN — BRIMONIDINE TARTRATE 1 DROP: 2 SOLUTION/ DROPS OPHTHALMIC at 21:12

## 2025-05-19 RX ADMIN — METOPROLOL SUCCINATE 100 MG: 100 TABLET, EXTENDED RELEASE ORAL at 09:09

## 2025-05-19 RX ADMIN — ATORVASTATIN CALCIUM 40 MG: 40 TABLET, FILM COATED ORAL at 21:11

## 2025-05-19 RX ADMIN — MIRTAZAPINE 7.5 MG: 15 TABLET, FILM COATED ORAL at 21:11

## 2025-05-19 RX ADMIN — DORZOLAMIDE HYDROCHLORIDE AND TIMOLOL MALEATE 1 DROP: 20; 5 SOLUTION OPHTHALMIC at 09:07

## 2025-05-19 RX ADMIN — LATANOPROST 1 DROP: 50 SOLUTION OPHTHALMIC at 21:11

## 2025-05-19 RX ADMIN — CEFTRIAXONE SODIUM 2 G: 2 INJECTION, SOLUTION INTRAVENOUS at 21:33

## 2025-05-19 RX ADMIN — SALINE NASAL SPRAY 1 SPRAY: 1.5 SOLUTION NASAL at 09:26

## 2025-05-19 RX ADMIN — SERTRALINE HYDROCHLORIDE 25 MG: 50 TABLET ORAL at 09:09

## 2025-05-19 RX ADMIN — APIXABAN 2.5 MG: 2.5 TABLET, FILM COATED ORAL at 21:12

## 2025-05-19 RX ADMIN — SALINE NASAL SPRAY 1 SPRAY: 1.5 SOLUTION NASAL at 21:10

## 2025-05-19 RX ADMIN — DORZOLAMIDE HYDROCHLORIDE AND TIMOLOL MALEATE 1 DROP: 20; 5 SOLUTION OPHTHALMIC at 21:12

## 2025-05-19 RX ADMIN — BRIMONIDINE TARTRATE 1 DROP: 2 SOLUTION/ DROPS OPHTHALMIC at 09:07

## 2025-05-19 RX ADMIN — SENNOSIDES AND DOCUSATE SODIUM 2 TABLET: 50; 8.6 TABLET ORAL at 09:09

## 2025-05-19 RX ADMIN — Medication 50 MCG: at 09:08

## 2025-05-19 RX ADMIN — APIXABAN 2.5 MG: 2.5 TABLET, FILM COATED ORAL at 09:08

## 2025-05-19 RX ADMIN — SENNOSIDES AND DOCUSATE SODIUM 2 TABLET: 50; 8.6 TABLET ORAL at 21:11

## 2025-05-19 ASSESSMENT — PAIN SCALES - WONG BAKER: WONGBAKER_NUMERICALRESPONSE: NO HURT

## 2025-05-19 ASSESSMENT — PAIN SCALES - GENERAL
PAINLEVEL_OUTOF10: 0 - NO PAIN

## 2025-05-19 ASSESSMENT — COGNITIVE AND FUNCTIONAL STATUS - GENERAL
EATING MEALS: A LITTLE
DAILY ACTIVITIY SCORE: 13
CLIMB 3 TO 5 STEPS WITH RAILING: TOTAL
MOVING TO AND FROM BED TO CHAIR: A LOT
DRESSING REGULAR LOWER BODY CLOTHING: A LITTLE
MOVING FROM LYING ON BACK TO SITTING ON SIDE OF FLAT BED WITH BEDRAILS: A LITTLE
EATING MEALS: A LITTLE
TURNING FROM BACK TO SIDE WHILE IN FLAT BAD: A LOT
HELP NEEDED FOR BATHING: A LOT
TURNING FROM BACK TO SIDE WHILE IN FLAT BAD: A LITTLE
DRESSING REGULAR UPPER BODY CLOTHING: A LOT
HELP NEEDED FOR BATHING: A LITTLE
STANDING UP FROM CHAIR USING ARMS: A LOT
PERSONAL GROOMING: A LOT
MOVING FROM LYING ON BACK TO SITTING ON SIDE OF FLAT BED WITH BEDRAILS: A LOT
PERSONAL GROOMING: A LOT
DRESSING REGULAR UPPER BODY CLOTHING: A LOT
TOILETING: A LOT
DAILY ACTIVITIY SCORE: 15
TOILETING: A LOT
MOVING TO AND FROM BED TO CHAIR: TOTAL
CLIMB 3 TO 5 STEPS WITH RAILING: A LOT
MOBILITY SCORE: 8
STANDING UP FROM CHAIR USING ARMS: TOTAL
MOBILITY SCORE: 14
WALKING IN HOSPITAL ROOM: A LOT
WALKING IN HOSPITAL ROOM: TOTAL
DRESSING REGULAR LOWER BODY CLOTHING: A LOT

## 2025-05-19 ASSESSMENT — PAIN - FUNCTIONAL ASSESSMENT
PAIN_FUNCTIONAL_ASSESSMENT: 0-10

## 2025-05-19 NOTE — CARE PLAN
Notified by nursing that on straight cath she got 400 mL of urine out.  Previous bladder scan had indicated only 150 mL.  Patient may be having some retention issues.  In addition patient not urinating without the straight cath today so far.  Will need to place indwelling Crawford catheter for urinary retention and strict I's and O's in the setting of significant CODY.

## 2025-05-19 NOTE — PROGRESS NOTES
Susan Patiño is a 89 y.o. female on day 3 of admission presenting with Altered mental status, unspecified altered mental status type.    Transitional Care Coordination Progress Note:  Patient discussed during interdisciplinary rounds.   Team members present: CHAR TAYLOR  Plan per Medical/Surgical team: Continue monitoring creatinine and urine output.   Payer: [facility]  Status: inpatient  Discharge disposition: Return to [place]  Potential Barriers: NMR  ADOD: 5/22/25

## 2025-05-19 NOTE — CONSULTS
"Nutrition Consult Note    Nutrition Assessment         Patient is a 89 y.o. female presenting with Altered mental status, unspecified altered mental status type in setting of acute cystitis.      History of of paroxysmal A-fib, Alzheimer's disease, CHF, CKD 3B, HTN, MDD, CAD, glaucoma, pulmonary hypertension, osteoarthritis, bilateral carotid artery stenosis, PVD, hyperlipidemia, chronic AAA and dissection, recurrent syncope.    Nutrition History:    Nursing Screening Consult per MST (Score 3) completed by RD remote assessment.  Per screening comments patients granddaughter reports poor po intake.  Patient resides ad [facility].    Patient provides limited information at time of interview this morning, likely in setting of advanced age and history of Alzheimer's disease.  She resides at a nursing home where she is served 3 meals/day - she does try to go to the dining room for meals when she is feeling up to it.  She cannot tell me if she has lost any weight, she reports a usual body weight of 54.5kgs, does used assistant devices for ambulation.      No nausea/vomiting, or pain.    Anthropometrics:  Height: 154.9 cm (5' 1\")   Weight: 50 kg (110 lb 3.7 oz)   BMI (Calculated): 20.84  IBW/kg (Dietitian Calculated): 105 kg  Weight History:   Wt Readings from Last 10 Encounters:   05/19/25 50 kg (110 lb 3.7 oz)     Weight Change %:  Weight History / % Weight Change: 8.3 percent weight loss in the last 6 months, severe  Significant Weight Loss: Yes  Interpretation of Weight Loss: >10% in 6 months  Significant Weight Gain: Non-fluid related    Nutrition Focused Physical Exam Findings:  Unable to complete due to remote RD assessment.    Nutrition Significant Labs:  BMP Trend:   Results from last 7 days   Lab Units 05/19/25  0624 05/18/25  0642 05/17/25  0527 05/15/25  1847   GLUCOSE mg/dL 106* 96 80 83   CALCIUM mg/dL 9.2 9.3 9.6 8.1*   SODIUM mmol/L 141 141 144 145   POTASSIUM mmol/L 4.0 3.9 3.6 3.5   CO2 mmol/L " 25 21 26 24   CHLORIDE mmol/L 104 106 106 115*   BUN mg/dL 51* 39* 30* 24*   CREATININE mg/dL 4.57* 2.97* 1.71* 0.99        Nutrition Specific Medications:  Medications reviewed.    I/O:   Last BM Date: 05/18/25; Stool Appearance: Soft (05/17/25 1530)    Dietary Orders (From admission, onward)       Start     Ordered    05/18/25 1641  Adult diet 2-3 grams sodium; 1:1 Feeding  Diet effective now        Question Answer Comment   Diet type 2-3 grams sodium    Select tray type: 1:1 Feeding        05/18/25 1640    05/16/25 1652  May Participate in Room Service  ( ROOM SERVICE MAY PARTICIPATE)  Once        Question:  .  Answer:  Yes    05/16/25 1651                     Estimated Needs:   Total Energy Estimated Needs in 24 hours (kCal): 1259 kCal  Method for Estimating Needs: ABW  Total Protein Estimated Needs in 24 Hours (g): 60 g  Method for Estimating 24 Hour Protein Needs: ABW    Nutrition Diagnosis   Malnutrition Diagnosis  Patient has Malnutrition Diagnosis: Yes  Diagnosis Status: New  Malnutrition Diagnosis: Severe malnutrition related to chronic disease or condition  Related to: poor intake in setting of acute illness, chronic disease (Alzheimers), as well as advanced age  As Evidenced by: severe weight loss greater than 10% in the last 6 months and energy intake less than 50% of esimated needs for greater than 5 days.       Nutrition Interventions/Recommendations   Nutrition prescription for oral nutrition    Nutrition Recommendations:  Individualized Nutrition Prescription Provided for : Recommend Adult Diet (2-3grams sodium) with 1:1 feeings assistnace at meals with Boost [facility] BID (530 calories/22grams protein each)    Nutrition Interventions/Goals:   Meals and Snacks: General healthful diet  Goal: Patient will consume 50 percent of 2-3 meals/day and 100 percent of one Boost [facility] daily    Nutrition Monitoring and Evaluation   Food/Nutrient Related History Monitoring  Monitoring and Evaluation Plan: Intake /  amount of food  Intake / Amount of food: Consumes at least 50% or more of meals/snacks/supplements    Goal Status: New goal(s) identified    Time Spent (min): 60 minutes       Time Spent (min): 60 minutes  Last Date of Nutrition Visit: 05/19/25  Nutrition Follow-Up Needed?: Dietitian to reassess per policy  Follow up Comment: Diet/ONS SPCM      05/19/25 at 12:42 PM - BEATRICE GROVES RDN, LD

## 2025-05-19 NOTE — CONSULTS
"Reason For Consult  CODY    History Of Present Illness  Susan Patiño is a 89 y.o. female presenting with past medical history of hypertension, CKD, pulmonary HTN, MI, A-fib on apixaban, heart failure, TIA/CVA, dementia, MI presents to the emergency department for concern for aphasia and hypertension from [facility] to .  Patient denying any pain however history is limited given dementia, hearing loss.  EMS reports glucose of 118.  At baseline oxygen 2 L O2. LKW at 11 am. They report she was slower to respond, no aphasia, no reported falls or witness falls.  CT CAP demonstrating an infrarenal fusiform AAA and aortic dissection of proximal SMA , per vascular no intervention needed as it appeared chronic .Her admission UA was suggestive of UTI so started ceftriaxone and BNP elevated suggesting CHF exacerbation.     Seen this afternoon, responding well to my questions. Quickly forgets what to answer but she is well oriented to TPP.Denies any SOB but is on supplemental oxygen. No nausea or diarrhea. No altered mentation observe.  Past Medical History  She has no past medical history on file.    Surgical History  She has no past surgical history on file.     Social History  She reports that she has never smoked. She has been exposed to tobacco smoke. She has never used smokeless tobacco. She reports that she does not currently use alcohol. She reports that she does not use drugs.    Family History  Family History[1]     Allergies  Celecoxib      Physical Exam  No apparent distress  CTAB  S1S2  No edema  Tremors in hands            I&O 24HR    Intake/Output Summary (Last 24 hours) at 5/19/2025 1607  Last data filed at 5/19/2025 1511  Gross per 24 hour   Intake 390 ml   Output 450 ml   Net -60 ml       Vitals 24HR  Heart Rate:  [61-82]   Temp:  [36.6 °C (97.9 °F)-37.6 °C (99.7 °F)]   Resp:  [16-19]   BP: (109-147)/()   Height:  [154.9 cm (5' 1\")]   Weight:  [50 kg (110 lb 3.7 oz)]   SpO2:  [93 %-98 %] "       Assessment & Plan  Altered mental status, unspecified altered mental status type    #CODY  -Baseline cr -0.9-1.3  -UOP-450 since morning  -Acidbase-stable  -Electrolytes acceptable  -UA-1+ bacteria and nitrite positive  -US renal:  RIGHT KIDNEY:  The right kidney measures 10.92 cm in length. The renal cortex is  echogenic. No hydronephrosis is present. No evidence of  nephrolithiasis. Multiple anechoic structures with posterior acoustic  enhancement and no internal vascularity, incompletely characterized  on ultrasound, but most consistent with simple renal cysts measuring  up to 4.6 x 3.1 x 3.3 cm. The previously seen enhancing lesion within  the right kidney is not definitively visualized on ultrasound likely  due to limited exam.      LEFT KIDNEY:  The left kidney is poorly visualized due to bowel gas and body  habitus. The left kidney measures 9.83 cm in length. The renal cortex  is echogenic. No hydronephrosis is present. No evidence of  nephrolithiasis. Hypoechoic structure within the left kidney with  posterior acoustic enhancement, which is incompletely evaluated on  ultrasound, but most consistent with a simple renal cyst.      BLADDER:  The urinary bladder is unremarkable in appearance.      IMPRESSION:  1. The previously described enhancing nodule in the right kidney from  prior CT chest abdomen and pelvis dated 05/15/2025 is not  definitively visualized on ultrasound examination, likely due to  limitations.  2. The bilateral kidneys are echogenic, which can be seen in the  setting of medical renal disease. Limited evaluation of the left  kidney as above. Multiple cysts throughout the bilateral kidneys. No  evidence of hydronephrosis  -Hemodynamics  -Etio-Likely contrast induce ATN(05/16, 05/16).along with ATN 2/2 hemodynamic given rapid fluctuation in her blood pressure and old age with poor kidney reserves makes kidney more prone to injury.    #Cystitis  #Afib    Recommendations:  -No indication  for RRT.  -Encourage oral intake.  -Please chart I/Os  -Daily BMP.  -Will avoid further contrast, further diuresis and dose all treatment to GFR.    Shayy De Jesus MD  Nephrology fellow.         [1] No family history on file.

## 2025-05-19 NOTE — CARE PLAN
The patient's goals for the shift include patietn will be HDS during the shift    The clinical goals for the shift include Patient will be free from fall/injury during the shift.    Problem: Safety - Medical Restraint  Goal: Remains free of injury from restraints (Restraint for Interference with Medical Device)  Outcome: Progressing  Goal: Free from restraint(s) (Restraint for Interference with Medical Device)  Outcome: Progressing     Problem: Pain - Adult  Goal: Verbalizes/displays adequate comfort level or baseline comfort level  Outcome: Progressing     Problem: Safety - Adult  Goal: Free from fall injury  Outcome: Progressing     Problem: Discharge Planning  Goal: Discharge to home or other facility with appropriate resources  Outcome: Progressing     Problem: Chronic Conditions and Co-morbidities  Goal: Patient's chronic conditions and co-morbidity symptoms are monitored and maintained or improved  Outcome: Progressing     Problem: Nutrition  Goal: Nutrient intake appropriate for maintaining nutritional needs  Outcome: Progressing     Problem: Skin  Goal: Decreased wound size/increased tissue granulation at next dressing change  Flowsheets (Taken 5/19/2025 0038)  Decreased wound size/increased tissue granulation at next dressing change: Protective dressings over bony prominences  Goal: Participates in plan/prevention/treatment measures  Flowsheets (Taken 5/19/2025 0038)  Participates in plan/prevention/treatment measures: Elevate heels  Goal: Prevent/manage excess moisture  Flowsheets (Taken 5/19/2025 0038)  Prevent/manage excess moisture:   Monitor for/manage infection if present   Moisturize dry skin   Cleanse incontinence/protect with barrier cream  Goal: Prevent/minimize sheer/friction injuries  Flowsheets (Taken 5/19/2025 0038)  Prevent/minimize sheer/friction injuries:   HOB 30 degrees or less   Turn/reposition every 2 hours/use positioning/transfer devices  Goal: Promote/optimize nutrition  Flowsheets  (Taken 5/19/2025 0038)  Promote/optimize nutrition:   Monitor/record intake including meals   Offer water/supplements/favorite foods

## 2025-05-19 NOTE — PROGRESS NOTES
"Susan Patiño is a 89 y.o. female on day 3 of admission presenting with Altered mental status, unspecified altered mental status type.    Subjective   Patient lying in bed, initially seen but she was sleeping but easily arousable with verbal stimulation.  Reports that she had some mild abdominal discomfort earlier this morning but already resolved.  No new complaints.  In discussion with bedside nursing there has been no significant urinary retention on bladder scans.    Objective     General: Lying in bed without distress.  Frail-appearing.  Cooperative.  Mildly hard of hearing.  Skin: No rashes or ulcerations.  HEENT: Sclera is white.  Mucous membranes moist.  Neck: Supple.  No JVD.  Cardiac: Regular rate and rhythm, S1/S2 normal.  Lungs: Clear to auscultation bilaterally, no wheezing or crackles, no accessory muscle use at rest.  Abdomen: Soft, nontender, nondistended, BS +  Extremities: No cyanosis.  No lower extremity edema.  Neurologic: Alert, responsive and answering questions today, oriented to self, partially to place, not to time.  No focal deficits.  Psychiatric: Appropriate mood and behavior.  Currently no agitation.    Last Recorded Vitals  Blood pressure (!) 146/109, pulse 74, temperature 37.1 °C (98.8 °F), temperature source Temporal, resp. rate 19, height 1.549 m (5' 1\"), weight 50 kg (110 lb 3.7 oz), SpO2 97%.  On 1 L O2 nasal cannula.    Intake/Output last 3 Shifts:  I/O last 3 completed shifts:  In: 600 (12 mL/kg) [P.O.:550; IV Piggyback:50]  Out: 300 (6 mL/kg) [Urine:300 (0.2 mL/kg/hr)]  Weight: 50 kg     Relevant Results  Scheduled Medications[1]   Continuous Medications[2]   PRN Medications[3]     Results for orders placed or performed during the hospital encounter of 05/15/25 (from the past 24 hours)   POCT GLUCOSE   Result Value Ref Range    POCT Glucose 151 (H) 74 - 99 mg/dL   POCT GLUCOSE   Result Value Ref Range    POCT Glucose 156 (H) 74 - 99 mg/dL   Basic Metabolic Panel   Result " Value Ref Range    Glucose 106 (H) 74 - 99 mg/dL    Sodium 141 136 - 145 mmol/L    Potassium 4.0 3.5 - 5.3 mmol/L    Chloride 104 98 - 107 mmol/L    Bicarbonate 25 21 - 32 mmol/L    Anion Gap 16 10 - 20 mmol/L    Urea Nitrogen 51 (H) 6 - 23 mg/dL    Creatinine 4.57 (H) 0.50 - 1.05 mg/dL    eGFR 8 (L) >60 mL/min/1.73m*2    Calcium 9.2 8.6 - 10.6 mg/dL      Hospital Course:  Susan Patiño is a 89-year-old F with a PMH of paroxysmal A-fib, Alzheimer's disease, CHF, CKD 3B, HTN, MDD, CAD, glaucoma, pulmonary hypertension, osteoarthritis, bilateral carotid artery stenosis, PVD, hyperlipidemia, chronic AAA and dissection, recurrent syncope who presents to [facility] from [facility] with complaints by family of confusion and altered mental status.  Patient was found to have acute cystitis and started on IV Rocephin.  Urine culture growing out Klebsiella.  Patient also noted to have pulmonary edema and felt to have acute on chronic diastolic ingestive heart failure on initial presentation.  Patient initially requiring 5 to 6 L of oxygen.  Patient given IV diuresis on 5/15 and 1 dose on 5/16.  Creatinine started to increase on 5/17, initially concerned that it could be related to diuresis and no further diuresis given.  It should be noted that patient also had contrast when initially evaluated on 5/15.  Over the next few days creatinine showed steady increase.    Assessment/Plan     Acute metabolic encephalopathy secondary to acute cystitis  -Patient reports that at baseline patient does have history of some cognitive memory issues but for the most part is alert and usually oriented x 3 but can be forgetful intermittently about time.  Usually can recognize family without issues.  -Encephalopathy secondary to infection.  -Patient has shown moderate improvement compared to admission but still not fully back to baseline.     Acute cystitis without hematuria  -Continue IV Rocephin.  -Urine culture from 5/15 growing  Klebsiella pneumonia, only resistant to ampicillin and nitrofurantoin, sensitive to everything else.  Will transition to oral Keflex on discharge.  Total planned duration 7 days treatment.    Acute kidney injury  -Baseline around 0.9-1.3 in the past.  -Creatinine increased further to 4.57 today.  -Concern etiology could be related to diuresis, versus cardiorenal versus contrast injury related.  Patient currently does not have significant peripheral edema, hypoxia has improved, not significant evidence still has significant congestive heart failure.  Chest x-ray did show some residual pulmonary edema though.  -Bladder scans postvoid have not shown significant retention, waiting on renal ultrasound, order urine electrolytes, strict I's and O's, avoid nephrotoxins, consult nephrology.  Currently holding her ACE inhibitor since 5/16.  Based on presentation and timeline, right now concerned that this might be contrast related injury.     Acute on chronic diastolic CHF exacerbation  -BNP elevated to the 200s, chest imaging showing pulmonary edema, hypoxia and bilateral lower extremity edema  -Status post Lasix 40 mg IV in the emergency department 5/15 night and another dose of Lasix 20 mg IV x 1 on 5/16  -Continue holding diuresis, last diuresis 5/16.    Acute on chronic hypoxic respiratory failure on home oxygen  -Baseline oxygen is 2 L O2 nasal cannula as needed.  -Patient initially presented with requirement of 6 L O2 nasal cannula.  Now currently down to 1 L O2 nasal cannula.  Continue weaning oxygen as tolerated.    Hypertensive urgency  -Significantly elevated blood pressures on arrival, now fairly well-controlled since we restarted her on her home medications.  -There was concern for acute aortic dissection and she was placed on esmolol and nicardipine drips, this was discontinued as vascular imaging appears chronic.  -Continue home metoprolol succinate 100 mg daily, holding lisinopril 10 mg daily since 5/16 due to  increasing creatinine.  -Systolic blood pressure 100s to 140s.     CT imaging with AAA and aortic dissection  -Vascular surgery consulted no vascular surgery intervention recommended AAA and aortic dissection appears chronic.     History of paroxysmal A-fib  -Currently rate controlled A-fib.  -Continue apixaban 2.5 mg twice daily  -Continue home metoprolol succinate 100 mg daily     HLD  - Continue home atorvastatin 40 mg nightly     Glaucoma  - Continue home drops    Disposition: Patient mentally has improved, but renal function demonstrating progressive worsening.  Concern for contrast related injury.  Consult nephrology and await input.  Monitor renal function.    Paulino Gardner MD           [1] apixaban, 2.5 mg, oral, BID  atorvastatin, 40 mg, oral, Nightly  brimonidine, 1 drop, Left Eye, BID  cefTRIAXone, 2 g, intravenous, q24h  cholecalciferol, 50 mcg, oral, Daily  dorzolamide-timoloL, 1 drop, Left Eye, BID  latanoprost, 1 drop, Left Eye, Nightly  [Held by provider] lisinopril, 10 mg, oral, Daily  metoprolol succinate XL, 100 mg, oral, Daily  mirtazapine, 7.5 mg, oral, Nightly  sennosides-docusate sodium, 2 tablet, oral, BID  sertraline, 25 mg, oral, Daily  sodium chloride, 1 spray, Each Nostril, BID     [2]    [3] PRN medications: acetaminophen **OR** acetaminophen **OR** acetaminophen, diclofenac sodium, ipratropium-albuteroL, labetaloL, OLANZapine zydis **OR** OLANZapine, ondansetron **OR** ondansetron

## 2025-05-20 LAB
ALBUMIN SERPL BCP-MCNC: 3.2 G/DL (ref 3.4–5)
ANION GAP SERPL CALC-SCNC: 17 MMOL/L (ref 10–20)
BNP SERPL-MCNC: 509 PG/ML (ref 0–99)
BUN SERPL-MCNC: 58 MG/DL (ref 6–23)
CALCIUM SERPL-MCNC: 8.9 MG/DL (ref 8.6–10.6)
CHLORIDE SERPL-SCNC: 104 MMOL/L (ref 98–107)
CO2 SERPL-SCNC: 24 MMOL/L (ref 21–32)
CREAT SERPL-MCNC: 5.29 MG/DL (ref 0.5–1.05)
EGFRCR SERPLBLD CKD-EPI 2021: 7 ML/MIN/1.73M*2
ERYTHROCYTE [DISTWIDTH] IN BLOOD BY AUTOMATED COUNT: 16.1 % (ref 11.5–14.5)
GLUCOSE BLD MANUAL STRIP-MCNC: 145 MG/DL (ref 74–99)
GLUCOSE SERPL-MCNC: 93 MG/DL (ref 74–99)
HCT VFR BLD AUTO: 35.2 % (ref 36–46)
HGB BLD-MCNC: 10.5 G/DL (ref 12–16)
MCH RBC QN AUTO: 27.6 PG (ref 26–34)
MCHC RBC AUTO-ENTMCNC: 29.8 G/DL (ref 32–36)
MCV RBC AUTO: 92 FL (ref 80–100)
NRBC BLD-RTO: 0 /100 WBCS (ref 0–0)
PHOSPHATE SERPL-MCNC: 6 MG/DL (ref 2.5–4.9)
PLATELET # BLD AUTO: 87 X10*3/UL (ref 150–450)
POTASSIUM SERPL-SCNC: 3.9 MMOL/L (ref 3.5–5.3)
RBC # BLD AUTO: 3.81 X10*6/UL (ref 4–5.2)
SODIUM SERPL-SCNC: 141 MMOL/L (ref 136–145)
WBC # BLD AUTO: 6.7 X10*3/UL (ref 4.4–11.3)

## 2025-05-20 PROCEDURE — 99233 SBSQ HOSP IP/OBS HIGH 50: CPT | Performed by: STUDENT IN AN ORGANIZED HEALTH CARE EDUCATION/TRAINING PROGRAM

## 2025-05-20 PROCEDURE — 2500000004 HC RX 250 GENERAL PHARMACY W/ HCPCS (ALT 636 FOR OP/ED): Mod: JZ | Performed by: STUDENT IN AN ORGANIZED HEALTH CARE EDUCATION/TRAINING PROGRAM

## 2025-05-20 PROCEDURE — 2500000001 HC RX 250 WO HCPCS SELF ADMINISTERED DRUGS (ALT 637 FOR MEDICARE OP): Performed by: STUDENT IN AN ORGANIZED HEALTH CARE EDUCATION/TRAINING PROGRAM

## 2025-05-20 PROCEDURE — 83880 ASSAY OF NATRIURETIC PEPTIDE: CPT | Performed by: STUDENT IN AN ORGANIZED HEALTH CARE EDUCATION/TRAINING PROGRAM

## 2025-05-20 PROCEDURE — 36415 COLL VENOUS BLD VENIPUNCTURE: CPT | Performed by: INTERNAL MEDICINE

## 2025-05-20 PROCEDURE — 82565 ASSAY OF CREATININE: CPT | Performed by: INTERNAL MEDICINE

## 2025-05-20 PROCEDURE — P9047 ALBUMIN (HUMAN), 25%, 50ML: HCPCS | Performed by: STUDENT IN AN ORGANIZED HEALTH CARE EDUCATION/TRAINING PROGRAM

## 2025-05-20 PROCEDURE — 85027 COMPLETE CBC AUTOMATED: CPT | Performed by: INTERNAL MEDICINE

## 2025-05-20 PROCEDURE — 2500000002 HC RX 250 W HCPCS SELF ADMINISTERED DRUGS (ALT 637 FOR MEDICARE OP, ALT 636 FOR OP/ED): Performed by: STUDENT IN AN ORGANIZED HEALTH CARE EDUCATION/TRAINING PROGRAM

## 2025-05-20 PROCEDURE — 82947 ASSAY GLUCOSE BLOOD QUANT: CPT

## 2025-05-20 PROCEDURE — 1200000002 HC GENERAL ROOM WITH TELEMETRY DAILY

## 2025-05-20 PROCEDURE — 80069 RENAL FUNCTION PANEL: CPT | Performed by: INTERNAL MEDICINE

## 2025-05-20 RX ORDER — ALBUMIN HUMAN 250 G/1000ML
12.5 SOLUTION INTRAVENOUS ONCE
Status: COMPLETED | OUTPATIENT
Start: 2025-05-20 | End: 2025-05-20

## 2025-05-20 RX ORDER — FUROSEMIDE 10 MG/ML
40 INJECTION INTRAMUSCULAR; INTRAVENOUS ONCE
Status: COMPLETED | OUTPATIENT
Start: 2025-05-20 | End: 2025-05-20

## 2025-05-20 RX ADMIN — METOPROLOL SUCCINATE 100 MG: 100 TABLET, EXTENDED RELEASE ORAL at 08:44

## 2025-05-20 RX ADMIN — ATORVASTATIN CALCIUM 40 MG: 40 TABLET, FILM COATED ORAL at 21:37

## 2025-05-20 RX ADMIN — SENNOSIDES AND DOCUSATE SODIUM 2 TABLET: 50; 8.6 TABLET ORAL at 08:44

## 2025-05-20 RX ADMIN — DORZOLAMIDE HYDROCHLORIDE AND TIMOLOL MALEATE 1 DROP: 20; 5 SOLUTION OPHTHALMIC at 21:36

## 2025-05-20 RX ADMIN — SALINE NASAL SPRAY 1 SPRAY: 1.5 SOLUTION NASAL at 21:36

## 2025-05-20 RX ADMIN — BRIMONIDINE TARTRATE 1 DROP: 2 SOLUTION/ DROPS OPHTHALMIC at 21:36

## 2025-05-20 RX ADMIN — LATANOPROST 1 DROP: 50 SOLUTION OPHTHALMIC at 21:36

## 2025-05-20 RX ADMIN — APIXABAN 2.5 MG: 2.5 TABLET, FILM COATED ORAL at 21:37

## 2025-05-20 RX ADMIN — SODIUM CHLORIDE 500 ML: 0.9 INJECTION, SOLUTION INTRAVENOUS at 10:20

## 2025-05-20 RX ADMIN — SERTRALINE HYDROCHLORIDE 25 MG: 50 TABLET ORAL at 08:44

## 2025-05-20 RX ADMIN — ALBUMIN HUMAN 12.5 G: 0.25 SOLUTION INTRAVENOUS at 18:53

## 2025-05-20 RX ADMIN — SODIUM CHLORIDE 500 ML: 0.9 INJECTION, SOLUTION INTRAVENOUS at 13:07

## 2025-05-20 RX ADMIN — Medication 50 MCG: at 08:44

## 2025-05-20 RX ADMIN — MIRTAZAPINE 7.5 MG: 15 TABLET, FILM COATED ORAL at 21:37

## 2025-05-20 RX ADMIN — APIXABAN 2.5 MG: 2.5 TABLET, FILM COATED ORAL at 08:44

## 2025-05-20 RX ADMIN — SALINE NASAL SPRAY 1 SPRAY: 1.5 SOLUTION NASAL at 08:44

## 2025-05-20 RX ADMIN — BRIMONIDINE TARTRATE 1 DROP: 2 SOLUTION/ DROPS OPHTHALMIC at 08:44

## 2025-05-20 RX ADMIN — FUROSEMIDE 40 MG: 10 INJECTION INTRAMUSCULAR; INTRAVENOUS at 19:27

## 2025-05-20 RX ADMIN — CEFTRIAXONE SODIUM 2 G: 2 INJECTION, SOLUTION INTRAVENOUS at 21:36

## 2025-05-20 RX ADMIN — DORZOLAMIDE HYDROCHLORIDE AND TIMOLOL MALEATE 1 DROP: 20; 5 SOLUTION OPHTHALMIC at 08:44

## 2025-05-20 ASSESSMENT — COGNITIVE AND FUNCTIONAL STATUS - GENERAL
DRESSING REGULAR UPPER BODY CLOTHING: A LOT
MOVING TO AND FROM BED TO CHAIR: TOTAL
MOVING FROM LYING ON BACK TO SITTING ON SIDE OF FLAT BED WITH BEDRAILS: A LOT
DAILY ACTIVITIY SCORE: 13
CLIMB 3 TO 5 STEPS WITH RAILING: TOTAL
MOBILITY SCORE: 8
TURNING FROM BACK TO SIDE WHILE IN FLAT BAD: A LOT
PERSONAL GROOMING: A LOT
EATING MEALS: A LITTLE
STANDING UP FROM CHAIR USING ARMS: TOTAL
WALKING IN HOSPITAL ROOM: TOTAL
TOILETING: A LOT
DRESSING REGULAR LOWER BODY CLOTHING: A LOT
HELP NEEDED FOR BATHING: A LOT

## 2025-05-20 ASSESSMENT — PAIN - FUNCTIONAL ASSESSMENT
PAIN_FUNCTIONAL_ASSESSMENT: 0-10

## 2025-05-20 ASSESSMENT — PAIN SCALES - GENERAL
PAINLEVEL_OUTOF10: 0 - NO PAIN

## 2025-05-20 NOTE — PROGRESS NOTES
INTERNAL MEDICINE PROGRESS NOTE     BRIEF NARRATIVE      Susan Patiño is a 89 y.o. female on day 4 of admission presenting with Altered mental status, unspecified altered mental status type.    ASSESSMENT / PLANS      Acute metabolic encephalopathy 2/2 UTI   Delirium   Generalized weakness   -No significant cognitive issues at baseline  -CTH non acute   -Encephalopathy presumed secondary to UTI   -Now improving but not fully at baseline   -PT/OT      Acute cystitis   Klebsiella UTI with pyuria   -Continue IV Rocephin.  -Urine culture from 5/15 growing Klebsiella pneumonia, only resistant to ampicillin and nitrofurantoin, sensitive to everything else.  Plan to transition to Keflex on discharge.       Acute kidney injury  Acute urinary retention   -Creatinine 0.99 on 5/15/25 . Cr 5.29 and BUN 58 today  -Etiology multifactorial, likely ATN, ddx include diuresis, BLAINE vs AIN, hypotension induced,  less likely cardiorenal since creatinine worsened with IV diuretics.   -Renal ultrasound noted bilateral kidney echogenicity, multiple cysts, no evidence of hydronephrosis    -Nephrology following, no indication for RRT   -Crawford inserted on 5/19.   -Cr worsening, decreased urine output today, will give 500cc bolus and monitor response.      Acute on chronic diastolic CHF exacerbation  -BNP elevated to the 200s, chest imaging showing pulmonary edema, hypoxia and bilateral lower extremity edema  -Status post Lasix 40 mg IV in the emergency department 5/15 night and another dose of Lasix 20 mg IV x 1 on 5/16  -Continue holding diuresis, last diuresis 5/16.     Acute on chronic hypoxic respiratory failure on home oxygen  -Baseline oxygen is 2 L  O2 nasal cannula as needed.  -Patient initially presented with requirement of 6 L O2 nasal cannula.  Now currently down to 1 L O2 nasal cannula.  Continue weaning oxygen as tolerated.     Essential HTN   Hypotension   BP now stable. Cont to monitor closely to avoid hypotension in the setting of possible ATN     CT imaging with AAA and aortic dissection  -Vascular surgery consulted no vascular surgery intervention recommended AAA and aortic dissection appears chronic.     History of paroxysmal A-fib  -Currently rate controlled A-fib.  -Continue apixaban 2.5 mg twice daily  -Continue home metoprolol succinate 100 mg daily     HLD  - Continue home atorvastatin 40 mg nightly     Glaucoma  - Continue home drops     Disposition: Patient mentally has improved, but renal function demonstrating progressive worsening.      SUBJECTIVE       Pt appeared more lethargic today.     OBJECTIVE      Visit Vitals  /78 (BP Location: Right arm, Patient Position: Lying)   Pulse 66   Temp 36.6 °C (97.9 °F) (Temporal)   Resp 19        Intake/Output Summary (Last 24 hours) at 5/20/2025 0831  Last data filed at 5/20/2025 0700  Gross per 24 hour   Intake 770 ml   Output 750 ml   Net 20 ml       Physical Exam   General: Lying in bed without distress.  Frail-appearing.  Cooperative.  Mildly hard of hearing.  Skin: No rashes or ulcerations.  HEENT: Sclera is white.  Mucous membranes moist.  Neck: Supple.  No JVD.  Cardiac: Regular rate and rhythm, S1/S2 normal.  Lungs: Clear to auscultation bilaterally, no wheezing or crackles, no accessory muscle use at rest.  Abdomen: Soft, nontender, nondistended, BS +  Extremities: No cyanosis.  No lower extremity edema.  Neurologic: Alert, responsive and answering questions today, oriented to self, partially to place, not to time.  No focal deficits.  Psychiatric: Appropriate mood and behavior.  Currently no agitation.    Current Meds   Scheduled Medications[1]   PRN Medications[2]     LABS and  IMAGING     WBC   Date Value Ref Range Status   05/20/2025 6.7 4.4 - 11.3 x10*3/uL Final     Hemoglobin   Date Value Ref Range Status   05/20/2025 10.5 (L) 12.0 - 16.0 g/dL Final     Hematocrit   Date Value Ref Range Status   05/20/2025 35.2 (L) 36.0 - 46.0 % Final     Bicarbonate   Date Value Ref Range Status   05/20/2025 24 21 - 32 mmol/L Final   05/19/2025 25 21 - 32 mmol/L Final   05/18/2025 21 21 - 32 mmol/L Final     Creatinine   Date Value Ref Range Status   05/20/2025 5.29 (H) 0.50 - 1.05 mg/dL Final   05/19/2025 4.57 (H) 0.50 - 1.05 mg/dL Final   05/18/2025 2.97 (H) 0.50 - 1.05 mg/dL Final     Calcium   Date Value Ref Range Status   05/20/2025 8.9 8.6 - 10.6 mg/dL Final   05/19/2025 9.2 8.6 - 10.6 mg/dL Final   05/18/2025 9.3 8.6 - 10.6 mg/dL Final       US renal complete  Narrative: Interpreted By:  Reed Hernandez and Hofer Lindsay   STUDY:  US RENAL COMPLETE;  5/19/2025 1:53 pm      INDICATION:  Signs/Symptoms:progressive CODY.          COMPARISON:  CT chest abdomen and pelvis 05/15/2025.      ACCESSION NUMBER(S):  [id number]      ORDERING CLINICIAN:  CORRIE YORK      TECHNIQUE:  Multiple images of the kidneys were obtained.      FINDINGS:  Limitations due to bowel gas and body habitus.      RIGHT KIDNEY:  The right kidney measures 10.92 cm in length. The renal cortex is  echogenic. No hydronephrosis is present. No evidence of  nephrolithiasis. Multiple anechoic structures with posterior acoustic  enhancement and no internal vascularity, incompletely characterized  on ultrasound, but most consistent with simple renal cysts measuring  up to 4.6 x 3.1 x 3.3 cm. The previously seen enhancing lesion within  the right kidney is not definitively visualized on ultrasound, likely  due to limited exam.      LEFT KIDNEY:  The left kidney is poorly visualized due to bowel gas and body  habitus. The visualized left kidney measures 9.83 cm in length. The  renal cortex is echogenic. No  hydronephrosis is present. No evidence  of nephrolithiasis. Hypoechoic structure within the left kidney with  posterior acoustic enhancement, which is incompletely evaluated on  ultrasound, but most consistent with a simple renal cyst.      BLADDER:  The urinary bladder is unremarkable in appearance.      Impression: 1. The previously described enhancing nodule in the right kidney from  prior CT chest abdomen and pelvis dated 05/15/2025 is not  definitively visualized on ultrasound examination, likely due to  limitations above. Consider MRI for further characterization in case  of persistent concern.  2. The bilateral kidneys are echogenic, which can be seen in the  setting of medical renal disease. Limited evaluation of the left  kidney as above. Multiple cysts throughout the bilateral kidneys. No  evidence of hydronephrosis.      I personally reviewed the images/study and resident's interpretation  and I agree with the findings as stated by Scarlett Gallego MD (resident  radiologist). This study was analyzed and interpreted at [place], [place].      MACRO:  None          Signed by: Reed Miramontes 5/19/2025 10:06 PM  Dictation workstation:   [id number]         PROBLEM LISTS      Problem List Items Addressed This Visit          Neuro    * (Principal) Altered mental status, unspecified altered mental status type - Primary     Other Visit Diagnoses         Acute cystitis without hematuria        Relevant Medications    acetaminophen (Tylenol) 325 mg tablet    cephalexin (Keflex) 500 mg capsule            This dictation was created with voice recognition software. Although every effort is made to review the dictation as it is transcribed, on occasion spoken words, phrases, names, numbers, punctuation etc can be misinterpreted by the the technology leading to omissions or inappropriate outcomes.     Sandra Ward MD                 [1] apixaban, 2.5 mg,  oral, BID  atorvastatin, 40 mg, oral, Nightly  brimonidine, 1 drop, Left Eye, BID  cefTRIAXone, 2 g, intravenous, q24h  cholecalciferol, 50 mcg, oral, Daily  dorzolamide-timoloL, 1 drop, Left Eye, BID  latanoprost, 1 drop, Left Eye, Nightly  [Held by provider] lisinopril, 10 mg, oral, Daily  metoprolol succinate XL, 100 mg, oral, Daily  mirtazapine, 7.5 mg, oral, Nightly  sennosides-docusate sodium, 2 tablet, oral, BID  sertraline, 25 mg, oral, Daily  sodium chloride, 1 spray, Each Nostril, BID    [2] PRN medications: acetaminophen **OR** acetaminophen **OR** acetaminophen, diclofenac sodium, ipratropium-albuteroL, labetaloL, OLANZapine zydis **OR** OLANZapine, ondansetron **OR** ondansetron

## 2025-05-20 NOTE — CARE PLAN
The patient's goals for the shift include Patient will remain safe and free from injury and falls during shift    The clinical goals for the shift include Patient will remain HDS during shift    Other goals include:  Problem: Safety - Medical Restraint  Goal: Remains free of injury from restraints (Restraint for Interference with Medical Device)  Outcome: Progressing  Goal: Free from restraint(s) (Restraint for Interference with Medical Device)  Outcome: Progressing     Problem: Pain - Adult  Goal: Verbalizes/displays adequate comfort level or baseline comfort level  Outcome: Progressing     Problem: Discharge Planning  Goal: Discharge to home or other facility with appropriate resources  Outcome: Progressing     Problem: Chronic Conditions and Co-morbidities  Goal: Patient's chronic conditions and co-morbidity symptoms are monitored and maintained or improved  Outcome: Progressing     Problem: Nutrition  Goal: Nutrient intake appropriate for maintaining nutritional needs  Outcome: Progressing     Problem: Skin  Goal: Decreased wound size/increased tissue granulation at next dressing change  Outcome: Progressing  Flowsheets (Taken 5/20/2025 1235)  Decreased wound size/increased tissue granulation at next dressing change: Protective dressings over bony prominences  Goal: Participates in plan/prevention/treatment measures  Outcome: Progressing  Flowsheets (Taken 5/20/2025 1235)  Participates in plan/prevention/treatment measures: Elevate heels  Goal: Prevent/manage excess moisture  Outcome: Progressing  Flowsheets (Taken 5/20/2025 1235)  Prevent/manage excess moisture:   Monitor for/manage infection if present   Follow provider orders for dressing changes  Goal: Prevent/minimize sheer/friction injuries  Outcome: Progressing  Flowsheets (Taken 5/20/2025 1235)  Prevent/minimize sheer/friction injuries:   Complete micro-shifts as needed if patient unable. Adjust patient position to relieve pressure points, not a full  turn   HOB 30 degrees or less   Turn/reposition every 2 hours/use positioning/transfer devices  Goal: Promote/optimize nutrition  Outcome: Progressing  Flowsheets (Taken 5/20/2025 1235)  Promote/optimize nutrition: Consume > 50% meals/supplements  Goal: Promote skin healing  Outcome: Progressing  Flowsheets (Taken 5/20/2025 1235)  Promote skin healing:   Ensure correct size (line/device) and apply per  instructions   Protective dressings over bony prominences   Turn/reposition every 2 hours/use positioning/transfer devices     Problem: Fall/Injury  Goal: Not fall by end of shift  Outcome: Progressing  Goal: Be free from injury by end of the shift  Outcome: Progressing  Goal: Verbalize understanding of personal risk factors for fall in the hospital  Outcome: Progressing  Goal: Verbalize understanding of risk factor reduction measures to prevent injury from fall in the home  Outcome: Progressing  Goal: Use assistive devices by end of the shift  Outcome: Progressing  Goal: Pace activities to prevent fatigue by end of the shift  Outcome: Progressing

## 2025-05-20 NOTE — CARE PLAN
The patient's goals for the shift include Patient will remain safe and free from injury and falls during shift    The clinical goals for the shift include patient will remain safe during the shift    Over the shift, the patient did not make progress toward the following goals. Barriers to progression include patient still confused . Recommendations to address these barriers include continue to oriented  Problem: Safety - Medical Restraint  Goal: Remains free of injury from restraints (Restraint for Interference with Medical Device)  5/20/2025 0556 by Candelario Kwok RN  Outcome: Progressing  5/20/2025 0555 by Candelario Kwok RN  Outcome: Progressing     Problem: Pain - Adult  Goal: Verbalizes/displays adequate comfort level or baseline comfort level  5/20/2025 0556 by Candelario Kwok RN  Outcome: Progressing  5/20/2025 0555 by Candelario Kwok RN  Outcome: Progressing     Problem: Safety - Adult  Goal: Free from fall injury  5/20/2025 0556 by Candelario Kwok RN  Outcome: Progressing  5/20/2025 0555 by Candelario Kwok RN  Outcome: Progressing     Problem: Discharge Planning  Goal: Discharge to home or other facility with appropriate resources  5/20/2025 0556 by Candelario Kwok RN  Outcome: Progressing  5/20/2025 0555 by Candelario Kwok RN  Outcome: Progressing   .

## 2025-05-20 NOTE — PROGRESS NOTES
Susan Patiño is a 89 y.o. female on day 4 of admission presenting with Altered mental status, unspecified altered mental status type.      Subjective   No acute event overnight.   Remained stable but bit sleepy.  On and off nausea.  On and off oxygen requirement     Objective          Vitals 24HR  Heart Rate:  [64-76]   Temp:  [36.4 °C (97.5 °F)-37.4 °C (99.3 °F)]   Resp:  [16-19]   BP: ()/(61-78)   SpO2:  [95 %-98 %]       Intake/Output last 3 Shifts:    Intake/Output Summary (Last 24 hours) at 5/20/2025 1524  Last data filed at 5/20/2025 1441  Gross per 24 hour   Intake 1800 ml   Output 500 ml   Net 1300 ml       Physical Exam  Constitutional:       Appearance: Normal appearance.   Cardiovascular:      Rate and Rhythm: Normal rate and regular rhythm.      Pulses: Normal pulses.      Heart sounds: Normal heart sounds.   Pulmonary:      Effort: Pulmonary effort is normal.      Breath sounds: Normal breath sounds.   Neurological:      Mental Status: She is alert and oriented to person, place, and time.             Assessment & Plan  Altered mental status, unspecified altered mental status type    #CODY  -Baseline cr -0.9-1.3  -UOP-700 x 24h r  -Acidbase-stable  -Electrolytes acceptable  -UA-1+ bacteria and nitrite positive  US renal:  RIGHT KIDNEY:  The right kidney measures 10.92 cm in length. The renal cortex is  echogenic. No hydronephrosis is present. No evidence of  nephrolithiasis. Multiple anechoic structures with posterior acoustic  enhancement and no internal vascularity, incompletely characterized  on ultrasound, but most consistent with simple renal cysts measuring  up to 4.6 x 3.1 x 3.3 cm. The previously seen enhancing lesion within  the right kidney is not definitively visualized on ultrasound likely  due to limited exam.      LEFT KIDNEY:  The left kidney is poorly visualized due to bowel gas and body  habitus. The left kidney measures 9.83 cm in length. The renal cortex  is echogenic. No  hydronephrosis is present. No evidence of  nephrolithiasis. Hypoechoic structure within the left kidney with  posterior acoustic enhancement, which is incompletely evaluated on  ultrasound, but most consistent with a simple renal cyst.      BLADDER:  The urinary bladder is unremarkable in appearance.      IMPRESSION:  1. The previously described enhancing nodule in the right kidney from  prior CT chest abdomen and pelvis dated 05/15/2025 is not  definitively visualized on ultrasound examination, likely due to  limitations.  2. The bilateral kidneys are echogenic, which can be seen in the  setting of medical renal disease. Limited evaluation of the left  kidney as above. Multiple cysts throughout the bilateral kidneys. No  evidence of hydronephrosis  -Hemodynamics  -Etio-Likely contrast induce ATN(05/16, 05/16).along with ATN 2/2 hemodynamic given rapid fluctuation in her blood pressure and old age with poor kidney reserves makes kidney more prone to injury.     #Cystitis- on ceftriaxone 2 g  #Afib     Recommendations:  -No indication for RRT.  -Encourage oral intake.  -Strict I/Os.  -Daily BMP.  -Avoid further contrast, nephrotoxins and diuretics if possible.        Shayy De Jessu MD  Nephrology Fellow.

## 2025-05-21 PROBLEM — R41.82 ALTERED MENTAL STATUS, UNSPECIFIED ALTERED MENTAL STATUS TYPE: Status: RESOLVED | Noted: 2025-05-16 | Resolved: 2025-05-21

## 2025-05-21 LAB
ALBUMIN SERPL BCP-MCNC: 3.2 G/DL (ref 3.4–5)
ALP SERPL-CCNC: 101 U/L (ref 33–136)
ALT SERPL W P-5'-P-CCNC: 21 U/L (ref 7–45)
ANION GAP SERPL CALC-SCNC: 15 MMOL/L (ref 10–20)
AST SERPL W P-5'-P-CCNC: 30 U/L (ref 9–39)
BILIRUB SERPL-MCNC: 0.4 MG/DL (ref 0–1.2)
BUN SERPL-MCNC: 63 MG/DL (ref 6–23)
CALCIUM SERPL-MCNC: 8.7 MG/DL (ref 8.6–10.6)
CHLORIDE SERPL-SCNC: 104 MMOL/L (ref 98–107)
CO2 SERPL-SCNC: 26 MMOL/L (ref 21–32)
CREAT SERPL-MCNC: 5.31 MG/DL (ref 0.5–1.05)
EGFRCR SERPLBLD CKD-EPI 2021: 7 ML/MIN/1.73M*2
ERYTHROCYTE [DISTWIDTH] IN BLOOD BY AUTOMATED COUNT: 15.7 % (ref 11.5–14.5)
GLUCOSE BLD MANUAL STRIP-MCNC: 100 MG/DL (ref 74–99)
GLUCOSE SERPL-MCNC: 89 MG/DL (ref 74–99)
HCT VFR BLD AUTO: 33.8 % (ref 36–46)
HGB BLD-MCNC: 10.5 G/DL (ref 12–16)
MCH RBC QN AUTO: 29.2 PG (ref 26–34)
MCHC RBC AUTO-ENTMCNC: 31.1 G/DL (ref 32–36)
MCV RBC AUTO: 94 FL (ref 80–100)
NRBC BLD-RTO: 0 /100 WBCS (ref 0–0)
PLATELET # BLD AUTO: 81 X10*3/UL (ref 150–450)
POTASSIUM SERPL-SCNC: 3.8 MMOL/L (ref 3.5–5.3)
PROT SERPL-MCNC: 5.9 G/DL (ref 6.4–8.2)
RBC # BLD AUTO: 3.59 X10*6/UL (ref 4–5.2)
SODIUM SERPL-SCNC: 141 MMOL/L (ref 136–145)
WBC # BLD AUTO: 6.5 X10*3/UL (ref 4.4–11.3)

## 2025-05-21 PROCEDURE — 36415 COLL VENOUS BLD VENIPUNCTURE: CPT | Performed by: STUDENT IN AN ORGANIZED HEALTH CARE EDUCATION/TRAINING PROGRAM

## 2025-05-21 PROCEDURE — 2500000002 HC RX 250 W HCPCS SELF ADMINISTERED DRUGS (ALT 637 FOR MEDICARE OP, ALT 636 FOR OP/ED): Performed by: STUDENT IN AN ORGANIZED HEALTH CARE EDUCATION/TRAINING PROGRAM

## 2025-05-21 PROCEDURE — 97165 OT EVAL LOW COMPLEX 30 MIN: CPT | Mod: GO

## 2025-05-21 PROCEDURE — 99232 SBSQ HOSP IP/OBS MODERATE 35: CPT | Performed by: INTERNAL MEDICINE

## 2025-05-21 PROCEDURE — 2500000004 HC RX 250 GENERAL PHARMACY W/ HCPCS (ALT 636 FOR OP/ED): Mod: JZ | Performed by: STUDENT IN AN ORGANIZED HEALTH CARE EDUCATION/TRAINING PROGRAM

## 2025-05-21 PROCEDURE — 97161 PT EVAL LOW COMPLEX 20 MIN: CPT | Mod: GP

## 2025-05-21 PROCEDURE — 97535 SELF CARE MNGMENT TRAINING: CPT | Mod: GO

## 2025-05-21 PROCEDURE — 82947 ASSAY GLUCOSE BLOOD QUANT: CPT

## 2025-05-21 PROCEDURE — 85027 COMPLETE CBC AUTOMATED: CPT | Performed by: STUDENT IN AN ORGANIZED HEALTH CARE EDUCATION/TRAINING PROGRAM

## 2025-05-21 PROCEDURE — 1200000002 HC GENERAL ROOM WITH TELEMETRY DAILY

## 2025-05-21 PROCEDURE — 2500000001 HC RX 250 WO HCPCS SELF ADMINISTERED DRUGS (ALT 637 FOR MEDICARE OP): Performed by: STUDENT IN AN ORGANIZED HEALTH CARE EDUCATION/TRAINING PROGRAM

## 2025-05-21 PROCEDURE — 80053 COMPREHEN METABOLIC PANEL: CPT | Performed by: STUDENT IN AN ORGANIZED HEALTH CARE EDUCATION/TRAINING PROGRAM

## 2025-05-21 PROCEDURE — 99232 SBSQ HOSP IP/OBS MODERATE 35: CPT | Performed by: STUDENT IN AN ORGANIZED HEALTH CARE EDUCATION/TRAINING PROGRAM

## 2025-05-21 RX ORDER — SODIUM CHLORIDE, SODIUM LACTATE, POTASSIUM CHLORIDE, CALCIUM CHLORIDE 600; 310; 30; 20 MG/100ML; MG/100ML; MG/100ML; MG/100ML
50 INJECTION, SOLUTION INTRAVENOUS CONTINUOUS
Status: DISCONTINUED | OUTPATIENT
Start: 2025-05-21 | End: 2025-05-21

## 2025-05-21 RX ADMIN — ACETAMINOPHEN 650 MG: 325 TABLET, FILM COATED ORAL at 13:37

## 2025-05-21 RX ADMIN — SODIUM CHLORIDE, SODIUM LACTATE, POTASSIUM CHLORIDE, AND CALCIUM CHLORIDE 50 ML/HR: .6; .31; .03; .02 INJECTION, SOLUTION INTRAVENOUS at 11:52

## 2025-05-21 RX ADMIN — LATANOPROST 1 DROP: 50 SOLUTION OPHTHALMIC at 22:24

## 2025-05-21 RX ADMIN — ATORVASTATIN CALCIUM 40 MG: 40 TABLET, FILM COATED ORAL at 22:25

## 2025-05-21 RX ADMIN — CEFTRIAXONE SODIUM 2 G: 2 INJECTION, SOLUTION INTRAVENOUS at 22:24

## 2025-05-21 RX ADMIN — DICLOFENAC SODIUM 4 G: 10 GEL TOPICAL at 18:06

## 2025-05-21 RX ADMIN — MIRTAZAPINE 7.5 MG: 15 TABLET, FILM COATED ORAL at 22:26

## 2025-05-21 RX ADMIN — APIXABAN 2.5 MG: 2.5 TABLET, FILM COATED ORAL at 22:26

## 2025-05-21 RX ADMIN — BRIMONIDINE TARTRATE 1 DROP: 2 SOLUTION/ DROPS OPHTHALMIC at 22:25

## 2025-05-21 RX ADMIN — METOPROLOL SUCCINATE 100 MG: 100 TABLET, EXTENDED RELEASE ORAL at 09:28

## 2025-05-21 RX ADMIN — SERTRALINE HYDROCHLORIDE 25 MG: 50 TABLET ORAL at 09:28

## 2025-05-21 RX ADMIN — SALINE NASAL SPRAY 1 SPRAY: 1.5 SOLUTION NASAL at 22:24

## 2025-05-21 RX ADMIN — DORZOLAMIDE HYDROCHLORIDE AND TIMOLOL MALEATE 1 DROP: 20; 5 SOLUTION OPHTHALMIC at 09:31

## 2025-05-21 RX ADMIN — APIXABAN 2.5 MG: 2.5 TABLET, FILM COATED ORAL at 09:28

## 2025-05-21 RX ADMIN — SALINE NASAL SPRAY 1 SPRAY: 1.5 SOLUTION NASAL at 09:29

## 2025-05-21 RX ADMIN — Medication 50 MCG: at 09:28

## 2025-05-21 RX ADMIN — ACETAMINOPHEN 650 MG: 325 TABLET, FILM COATED ORAL at 18:11

## 2025-05-21 RX ADMIN — BRIMONIDINE TARTRATE 1 DROP: 2 SOLUTION/ DROPS OPHTHALMIC at 09:29

## 2025-05-21 ASSESSMENT — COGNITIVE AND FUNCTIONAL STATUS - GENERAL
WALKING IN HOSPITAL ROOM: TOTAL
TOILETING: A LOT
DRESSING REGULAR LOWER BODY CLOTHING: A LOT
MOBILITY SCORE: 6
STANDING UP FROM CHAIR USING ARMS: A LOT
MOVING FROM LYING ON BACK TO SITTING ON SIDE OF FLAT BED WITH BEDRAILS: TOTAL
DRESSING REGULAR UPPER BODY CLOTHING: A LOT
DRESSING REGULAR UPPER BODY CLOTHING: A LOT
DAILY ACTIVITIY SCORE: 12
TURNING FROM BACK TO SIDE WHILE IN FLAT BAD: TOTAL
CLIMB 3 TO 5 STEPS WITH RAILING: TOTAL
DRESSING REGULAR LOWER BODY CLOTHING: A LOT
DRESSING REGULAR UPPER BODY CLOTHING: A LOT
DAILY ACTIVITIY SCORE: 13
WALKING IN HOSPITAL ROOM: TOTAL
EATING MEALS: A LOT
MOVING TO AND FROM BED TO CHAIR: A LOT
EATING MEALS: A LOT
STANDING UP FROM CHAIR USING ARMS: TOTAL
MOVING TO AND FROM BED TO CHAIR: TOTAL
EATING MEALS: A LITTLE
PERSONAL GROOMING: A LOT
MOVING FROM LYING ON BACK TO SITTING ON SIDE OF FLAT BED WITH BEDRAILS: A LITTLE
HELP NEEDED FOR BATHING: A LOT
TOILETING: A LOT
DAILY ACTIVITIY SCORE: 12
PERSONAL GROOMING: A LOT
PERSONAL GROOMING: A LOT
TURNING FROM BACK TO SIDE WHILE IN FLAT BAD: A LOT
WALKING IN HOSPITAL ROOM: TOTAL
CLIMB 3 TO 5 STEPS WITH RAILING: TOTAL
MOBILITY SCORE: 11
TOILETING: A LOT
HELP NEEDED FOR BATHING: A LOT
CLIMB 3 TO 5 STEPS WITH RAILING: TOTAL
MOVING FROM LYING ON BACK TO SITTING ON SIDE OF FLAT BED WITH BEDRAILS: A LITTLE
TURNING FROM BACK TO SIDE WHILE IN FLAT BAD: A LOT
DRESSING REGULAR LOWER BODY CLOTHING: A LOT
MOVING TO AND FROM BED TO CHAIR: A LOT
HELP NEEDED FOR BATHING: A LOT
MOBILITY SCORE: 11
STANDING UP FROM CHAIR USING ARMS: A LOT

## 2025-05-21 ASSESSMENT — PAIN DESCRIPTION - ORIENTATION: ORIENTATION: LEFT;LOWER

## 2025-05-21 ASSESSMENT — PAIN - FUNCTIONAL ASSESSMENT
PAIN_FUNCTIONAL_ASSESSMENT: 0-10

## 2025-05-21 ASSESSMENT — PAIN SCALES - GENERAL
PAINLEVEL_OUTOF10: 6
PAINLEVEL_OUTOF10: 0 - NO PAIN
PAINLEVEL_OUTOF10: 6
PAINLEVEL_OUTOF10: 6

## 2025-05-21 ASSESSMENT — ACTIVITIES OF DAILY LIVING (ADL)
HOME_MANAGEMENT_TIME_ENTRY: 10
BATHING_ASSISTANCE: MAXIMAL
ADL_ASSISTANCE: NEEDS ASSISTANCE

## 2025-05-21 ASSESSMENT — PAIN DESCRIPTION - LOCATION
LOCATION: BACK
LOCATION: BACK

## 2025-05-21 NOTE — PROGRESS NOTES
INTERNAL MEDICINE PROGRESS NOTE     BRIEF NARRATIVE      Susan Patiño is a 89 y.o. female on day 5 of admission presenting with Altered mental status, unspecified altered mental status type.    ASSESSMENT / PLANS      Acute kidney injury  Acute urinary retention   -Creatinine 0.99 on 5/15/25 . Cr 5.29 --> 5.31 today   -Etiology multifactorial, likely ATN. ddx include diuresis, BLAINE vs AIN,  less likely cardiorenal since creatinine worsened with IV diuretics.   -Renal ultrasound noted bilateral kidney echogenicity, multiple cysts, no evidence of hydronephrosis    -Nephrology following, no indication for RRT   -Crawford inserted on 5/19.   -Cr still elevated but looked plateau. UOP improved today after 1L bolus, albumin with 1 time dose lasix yesterday.   -Will hold IVF for now, encourage oral intake.     Acute metabolic encephalopathy 2/2 UTI   Delirium   Generalized weakness   -No significant cognitive issues at baseline  -CTH non acute   -Encephalopathy presumed secondary to UTI   -Now improving but not fully at baseline   -PT/OT daily     Acute cystitis   Klebsiella UTI with pyuria   -Continue IV Rocephin.  -Urine culture from 5/15 growing Klebsiella pneumonia, only resistant to ampicillin and nitrofurantoin, sensitive to everything else.  Plan to transition to Keflex on discharge.       Acute on chronic diastolic CHF exacerbation  -BNP elevated to the 200s, chest imaging showing pulmonary edema, hypoxia and bilateral lower extremity edema  -Status post Lasix 40 mg IV in the emergency department 5/15 night and another dose of Lasix 20 mg IV x 1 on 5/16  -Continue holding diuresis, last diuresis 5/16.     Acute on chronic hypoxic  respiratory failure on home oxygen  -Baseline oxygen is 2 L O2 nasal cannula as needed.  -Patient initially presented with requirement of 6 L O2 nasal cannula.  Now currently down to 1 L O2 nasal cannula.  Continue weaning oxygen as tolerated.     Essential HTN   Hypotension   BP now stable. Cont to monitor closely to avoid hypotension in the setting of possible ATN     CT imaging with AAA and aortic dissection  -Vascular surgery consulted no vascular surgery intervention recommended AAA and aortic dissection appears chronic.     History of paroxysmal A-fib  -Currently rate controlled A-fib.  -Continue apixaban 2.5 mg twice daily  -Continue home metoprolol succinate 100 mg daily     HLD  - Continue home atorvastatin 40 mg nightly     Glaucoma  - Continue home drops     Disposition: Patient mentally has improved, but renal function demonstrating progressive worsening.      SUBJECTIVE       Pt appeared more alert and awake today    OBJECTIVE      Visit Vitals  /85   Pulse 75   Temp 36.6 °C (97.9 °F)   Resp 16        Intake/Output Summary (Last 24 hours) at 5/21/2025 1309  Last data filed at 5/21/2025 1100  Gross per 24 hour   Intake 850 ml   Output 550 ml   Net 300 ml       Physical Exam   General: Lying in bed without distress.  Frail-appearing.  Cooperative.  Mildly hard of hearing.  Skin: No rashes or ulcerations.  HEENT: Sclera is white.  Mucous membranes moist.  Neck: Supple.  No JVD.  Cardiac: Regular rate and rhythm, S1/S2 normal.  Lungs: Clear to auscultation bilaterally, no wheezing or crackles, no accessory muscle use at rest.  Abdomen: Soft, nontender, nondistended, BS +  Extremities: No cyanosis.  No lower extremity edema.  Neurologic: Alert, responsive and answering questions today, oriented to self, partially to place, not to time.  No focal deficits.  Psychiatric: Appropriate mood and behavior.  Currently no agitation.    Current Meds   Scheduled Medications[1]   PRN Medications[2]     LABS and  IMAGING     WBC   Date Value Ref Range Status   05/21/2025 6.5 4.4 - 11.3 x10*3/uL Final   05/20/2025 6.7 4.4 - 11.3 x10*3/uL Final     Hemoglobin   Date Value Ref Range Status   05/21/2025 10.5 (L) 12.0 - 16.0 g/dL Final   05/20/2025 10.5 (L) 12.0 - 16.0 g/dL Final     Hematocrit   Date Value Ref Range Status   05/21/2025 33.8 (L) 36.0 - 46.0 % Final   05/20/2025 35.2 (L) 36.0 - 46.0 % Final     Bicarbonate   Date Value Ref Range Status   05/21/2025 26 21 - 32 mmol/L Final   05/20/2025 24 21 - 32 mmol/L Final   05/19/2025 25 21 - 32 mmol/L Final     Creatinine   Date Value Ref Range Status   05/21/2025 5.31 (H) 0.50 - 1.05 mg/dL Final   05/20/2025 5.29 (H) 0.50 - 1.05 mg/dL Final   05/19/2025 4.57 (H) 0.50 - 1.05 mg/dL Final     Calcium   Date Value Ref Range Status   05/21/2025 8.7 8.6 - 10.6 mg/dL Final   05/20/2025 8.9 8.6 - 10.6 mg/dL Final   05/19/2025 9.2 8.6 - 10.6 mg/dL Final       US renal complete  Narrative: Interpreted By:  Reed Hernandez and Hofer Lindsay   STUDY:  US RENAL COMPLETE;  5/19/2025 1:53 pm      INDICATION:  Signs/Symptoms:progressive CODY.          COMPARISON:  CT chest abdomen and pelvis 05/15/2025.      ACCESSION NUMBER(S):  [id number]      ORDERING CLINICIAN:  CORRIE YORK      TECHNIQUE:  Multiple images of the kidneys were obtained.      FINDINGS:  Limitations due to bowel gas and body habitus.      RIGHT KIDNEY:  The right kidney measures 10.92 cm in length. The renal cortex is  echogenic. No hydronephrosis is present. No evidence of  nephrolithiasis. Multiple anechoic structures with posterior acoustic  enhancement and no internal vascularity, incompletely characterized  on ultrasound, but most consistent with simple renal cysts measuring  up to 4.6 x 3.1 x 3.3 cm. The previously seen enhancing lesion within  the right kidney is not definitively visualized on ultrasound, likely  due to limited exam.      LEFT KIDNEY:  The left kidney is poorly visualized due to  bowel gas and body  habitus. The visualized left kidney measures 9.83 cm in length. The  renal cortex is echogenic. No hydronephrosis is present. No evidence  of nephrolithiasis. Hypoechoic structure within the left kidney with  posterior acoustic enhancement, which is incompletely evaluated on  ultrasound, but most consistent with a simple renal cyst.      BLADDER:  The urinary bladder is unremarkable in appearance.      Impression: 1. The previously described enhancing nodule in the right kidney from  prior CT chest abdomen and pelvis dated 05/15/2025 is not  definitively visualized on ultrasound examination, likely due to  limitations above. Consider MRI for further characterization in case  of persistent concern.  2. The bilateral kidneys are echogenic, which can be seen in the  setting of medical renal disease. Limited evaluation of the left  kidney as above. Multiple cysts throughout the bilateral kidneys. No  evidence of hydronephrosis.      I personally reviewed the images/study and resident's interpretation  and I agree with the findings as stated by Scarlett Gallego MD (resident  radiologist). This study was analyzed and interpreted at [facility], [place], [place].      MACRO:  None          Signed by: Reed Miramontes 5/19/2025 10:06 PM  Dictation workstation:   [id number]         PROBLEM LISTS      Problem List Items Addressed This Visit          Neuro    * (Principal) Altered mental status, unspecified altered mental status type - Primary     Other Visit Diagnoses         Acute cystitis without hematuria        Relevant Medications    acetaminophen (Tylenol) 325 mg tablet    cephalexin (Keflex) 500 mg capsule            This dictation was created with voice recognition software. Although every effort is made to review the dictation as it is transcribed, on occasion spoken words, phrases, names, numbers, punctuation etc can be misinterpreted by the the  technology leading to omissions or inappropriate outcomes.     Sandra Ward MD                 [1] apixaban, 2.5 mg, oral, BID  atorvastatin, 40 mg, oral, Nightly  brimonidine, 1 drop, Left Eye, BID  cefTRIAXone, 2 g, intravenous, q24h  cholecalciferol, 50 mcg, oral, Daily  dorzolamide-timoloL, 1 drop, Left Eye, BID  latanoprost, 1 drop, Left Eye, Nightly  [Held by provider] lisinopril, 10 mg, oral, Daily  metoprolol succinate XL, 100 mg, oral, Daily  mirtazapine, 7.5 mg, oral, Nightly  sennosides-docusate sodium, 2 tablet, oral, BID  sertraline, 25 mg, oral, Daily  sodium chloride, 1 spray, Each Nostril, BID     [2] PRN medications: acetaminophen **OR** acetaminophen **OR** acetaminophen, diclofenac sodium, ipratropium-albuteroL, labetaloL, OLANZapine zydis **OR** OLANZapine, ondansetron **OR** ondansetron

## 2025-05-21 NOTE — PROGRESS NOTES
Occupational Therapy    Evaluation/Treatment    Patient Name: Susan Patiño  MRN: [id number]  Department: [facility] 60  Room: 60/6067-A  Today's Date: 05/21/25  Time Calculation  Start Time: 1134  Stop Time: 1201  Time Calculation (min): 27 min       Assessment:  OT Assessment: difficulty I/ADLS, safety, fxnl mob  Prognosis: Good  Barriers to Discharge Home: Physical needs  Physical Needs: 24hr mobility assistance needed, 24hr ADL assistance needed, High falls risk due to function or environment  Evaluation/Treatment Tolerance: Patient tolerated treatment well  Medical Staff Made Aware: Yes  End of Session Communication: Bedside nurse  End of Session Patient Position: Bed, 3 rail up, Alarm off, caregiver present  OT Assessment Results: Decreased ADL status, Decreased upper extremity strength, Decreased safe judgment during ADL, Decreased cognition, Decreased endurance, Decreased functional mobility, Decreased IADLs  Prognosis: Good  Evaluation/Treatment Tolerance: Patient tolerated treatment well  Medical Staff Made Aware: Yes  Strengths: Attitude of self  Barriers to Participation: Comorbidities  Plan:  Treatment Interventions: ADL retraining, Functional transfer training, UE strengthening/ROM, Endurance training, Cognitive reorientation, Patient/family training, Equipment evaluation/education, Compensatory technique education  OT Frequency: 3 times per week  OT Discharge Recommendations: Moderate intensity level of continued care  Equipment Recommended upon Discharge:  (n/a)  OT Recommended Transfer Status: Assist of 2  OT - OK to Discharge: Yes  Treatment Interventions: ADL retraining, Functional transfer training, UE strengthening/ROM, Endurance training, Cognitive reorientation, Patient/family training, Equipment evaluation/education, Compensatory technique education    Subjective   Current Problem:  1. Altered mental status, unspecified altered mental status type        2. Acute cystitis without hematuria   acetaminophen (Tylenol) 325 mg tablet    cephalexin (Keflex) 500 mg capsule        OT Visit Info:  OT Received On: 05/21/25  General Visit Info:   General  Reason for Referral: AMS  Past Medical History Relevant to Rehab: A-fib, Alzheimer's disease, CHF, CKD 3B, HTN, MDD, CAD, glaucoma, pulmonary hypertension, osteoarthritis, bilateral carotid artery stenosis, PVD, hyperlipidemia, chronic AAA and dissection, recurrent syncope who presents to [facility] from [facility] with complaints by family of confusion and altered mental status.  Family/Caregiver Present: Yes  Caregiver Feedback: dtr present A with PLOF  Co-Treatment: PT  Co-Treatment Reason: maximize pt safety  Prior to Session Communication: Bedside nurse  Patient Position Received: Bed, 3 rail up, Alarm off, caregiver present  General Comment: pleasant, [place]   Precautions:  Medical Precautions: Fall precautions     Date/Time Vitals Session Patient Position Pulse Resp SpO2 BP MAP (mmHg)    05/21/25 1134 During OT  --  68  --  --  --  --     05/21/25 12:36:46 --  --  75  --  99 %  145/85  105                 Pain:  Pain Assessment  Pain Assessment: 0-10  0-10 (Numeric) Pain Score:  (pain in back, not rated)    Objective   Cognition:  Overall Cognitive Status: Impaired  Orientation Level: Disoriented to time, Disoriented to place  Following Commands: Follows one step commands with increased time  Safety Judgment: Decreased awareness of need for assistance  Insight: Moderate  Processing Speed: Delayed           Home Living:  Type of Home:  ([place])  Lives With:  (staff)  Home Adaptive Equipment: Walker rolling or standard, Wheelchair-manual (use at facility)  Home Layout: One level  Home Access: No concerns  Bathroom Shower/Tub: Walk-in shower  Prior Function:  Level of [place]: Needs assistance with ADLs, Needs assistance with homemaking, Needs assistance with functional transfers  Receives Help From:  (staff, dtr reports x1 A WhW to bathroom)  ADL  Assistance: Needs assistance  Ambulatory Assistance: Needs assistance (WhW reports not walking as far lately)  Vocational: Retired  Hand Dominance: Right  IADL History:  IADL Comments: A IADLs, I feeding  ADL:  Eating Assistance: Stand by (anticpiated)  Grooming Assistance:  (mod A anticipated seated)  Bathing Assistance: Maximal  UE Dressing Assistance: Maximal (adjust gown seated and standing)  LE Dressing Assistance: Maximal (garry sock sup)  Toileting Assistance with Device:  (incontinent BM in session total A dg care standing and side lying)  Functional Deficit: Steadying, Setup, Verbal cueing, Supervision/safety, Increased time to complete    Activity Tolerance:  Endurance: Decreased tolerance for upright activites     Bed Mobility/Transfers: Bed Mobility  Bed Mobility:  (sup/sit max A x2, rolling each way max A for bed/chux change, boost in bed max A x2)    Transfers  Transfer:  (2x sit to stand MAX AX2 HHA, mod A x1 HHA)      Functional Mobility:  Functional Mobility  Functional Mobility Performed: No  Sitting Balance:  Dynamic Sitting Balance  Dynamic Sitting-Level of Assistance:  (CGA L lateral lean, fatigues quickly EOB)  Standing Balance:  Dynamic Standing Balance  Dynamic Standing-Level of Assistance:  (max A x2-mod A x1 HHA)        Vision:Vision - Basic Assessment  Current Vision: Wears glasses all the time  Sensation:  Light Touch:  (n/t hands)  Strength:  Strength Comments: BUE 3+/5       Coordination:  Movements are Fluid and Coordinated: No   Hand Function:  Hand Function  Gross Grasp: Functional  Extremities: RUE   RUE : Within Functional Limits and LUE   LUE: Within Functional Limits      Outcome Measures: [facility] Daily Activity  Putting on and taking off regular lower body clothing: A lot  Bathing (including washing, rinsing, drying): A lot  Putting on and taking off regular upper body clothing: A lot  Toileting, which includes using toilet, bedpan or urinal: A lot  Taking care of personal  grooming such as brushing teeth: A lot  Eating Meals: A little  Daily Activity - Total Score: 13         and OT Adult Other Outcome Measures  4AT: dementia at baseline, +    Education Documentation  Body Mechanics, taught by Peyton Joya OT at 5/21/2025 12:54 PM.  Learner: Family, Patient  Readiness: Acceptance  Method: Explanation, Demonstration  Response: Verbalizes Understanding, Needs Reinforcement  Comment: fletcher mob ADLs    Precautions, taught by Peyton Joya OT at 5/21/2025 12:54 PM.  Learner: Family, Patient  Readiness: Acceptance  Method: Explanation, Demonstration  Response: Verbalizes Understanding, Needs Reinforcement  Comment: fletcher julian ADLs    ADL Training, taught by Peyton Joya OT at 5/21/2025 12:54 PM.  Learner: Family, Patient  Readiness: Acceptance  Method: Explanation, Demonstration  Response: Verbalizes Understanding, Needs Reinforcement  Comment: fxnl mob ADLs    Education Comments  No comments found.             Goals:  Encounter Problems       Encounter Problems (Active)       ADLs       Patient will perform UB and LB bathing  with minimal assist  level of assistance and ae. (Progressing)       Start:  05/21/25    Expected End:  06/11/25            Patient with complete upper body dressing with stand by assist level of assistance donning and doffing all UE clothes  (Progressing)       Start:  05/21/25    Expected End:  06/11/25            Patient with complete lower body dressing with minimal assist  level of assistance donning and doffing all LE clothes  with PRN adaptive equipment  (Progressing)       Start:  05/21/25    Expected End:  06/11/25            Patient will complete daily grooming tasks  with stand by assist level of assistance  (Progressing)       Start:  05/21/25    Expected End:  06/11/25            Patient will complete toileting including hygiene clothing management/hygiene with minimal assist  level of assistance and lrd. (Progressing)       Start:  05/21/25     Expected End:  06/11/25               COGNITION/SAFETY       Patient will follow >75% Simple commands to allow improved ADL performance. (Progressing)       Start:  05/21/25    Expected End:  06/11/25               EXERCISE/STRENGTHENING       Patient with increase BUE to wfl strength. (Progressing)       Start:  05/21/25    Expected End:  06/11/25               MOBILITY       Patient will perform Functional mobility mod  Household distances/Community Distances with minimal assist  level of assistance and least restrictive device in order to improve safety and functional mobility. (Progressing)       Start:  05/21/25    Expected End:  06/11/25               TRANSFERS       Patient will perform bed mobility contact guard assist level of assistance and bed rails in order to improve safety and independence with mobility (Progressing)       Start:  05/21/25    Expected End:  06/11/25            Patient will complete functional transfer  least restrictive device with contact guard assist level of assistance. (Progressing)       Start:  05/21/25    Expected End:  06/11/25

## 2025-05-21 NOTE — PROGRESS NOTES
Physical Therapy    Physical Therapy    Physical Therapy Evaluation    Patient Name: Susan Patiño  MRN: [id number]  Today's Date: 5/21/2025   Time Calculation  Start Time: 1136  Stop Time: 1200  Time Calculation (min): 24 min  6067/6067-A    Assessment/Plan   PT Assessment  PT Assessment Results: Decreased endurance, Impaired balance, Decreased mobility, Decreased cognition, Impaired hearing  Rehab Prognosis: Fair  Barriers to Discharge Home: No anticipated barriers (patient to return to LTC facility)  Evaluation/Treatment Tolerance: Patient limited by fatigue  Strengths: Attitude of self  Barriers to Participation: Comorbidities  End of Session Communication: Bedside nurse  Assessment Comment: patient presents with decreased functional mobility due to recent UTI and metabolic encephalopathy. patient would benefit from continued PT to address functional impairments and facilitate return to prior functional mobility.  End of Session Patient Position: Bed, 3 rail up, Alarm on, Alarm off, caregiver present  IP OR SWING BED PT PLAN  Inpatient or Swing Bed: Inpatient  PT Plan  Treatment/Interventions: Bed mobility, Transfer training, Gait training, Balance training, Therapeutic exercise, Therapeutic activity  PT Plan: Ongoing PT  PT Frequency: 3 times per week  PT Discharge Recommendations: Moderate intensity level of continued care  PT Recommended Transfer Status: Assist x2  PT - OK to Discharge: Yes (when medically appropriate)    Subjective     Current Problem:  1. Altered mental status, unspecified altered mental status type        2. Acute cystitis without hematuria  acetaminophen (Tylenol) 325 mg tablet    cephalexin (Keflex) 500 mg capsule        Problem List[1]    General Visit Information:  General  Reason for Referral: AMS  Referred By: Dr Ward 5/20  Past Medical History Relevant to Rehab: A-fib, Alzheimer's disease, CHF, CKD 3B, HTN, MDD, CAD, glaucoma, pulmonary hypertension, osteoarthritis,  bilateral carotid artery stenosis, PVD, hyperlipidemia, chronic AAA and dissection, recurrent syncope who presents to [facility] from [facility] with complaints by family of confusion and altered mental status.  Family/Caregiver Present: Yes  Caregiver Feedback: dtr present A with PLOF  Co-Treatment: OT  Co-Treatment Reason: facilitate safe functional mobility  Prior to Session Communication: Bedside nurse  Patient Position Received: Bed, 3 rail up, Alarm on  General Comment: patient admitted on 5/15 due to acute metabolic encephalopathy, acute cystitis, klebsiella UTI    Home Living:  Home Living  Home Living Comments: Lives in LTC facility. Patient is able to transfer with one assist, can walk to the bathroom with wheeled walker. Unsure of wheelchair use, patient is a poor historian and family was unsure.    Prior Level of Function:  Prior Function Per Pt/Caregiver Report  Prior Function Comments: at LTC facility. Assume assist with all ADLS. Patient unable to give details.    Precautions:  Precautions  Medical Precautions: Fall precautions    Vital Signs:     Objective     Pain:  Pain Assessment  Pain Assessment: 0-10  0-10 (Numeric) Pain Score:  (pain in back, unable to rate)    Cognition:  Cognition  Overall Cognitive Status: Impaired  Orientation Level: Disoriented to time, Disoriented to place    General Assessments:  General Observation  General Observation: patient agreeable to session.   Activity Tolerance  Endurance: Decreased tolerance for upright activites              Postural Control  Posture Comment: Difficulty maintaining sitting on edge of bed. Leans to left due to fatigue.  Dynamic Sitting Balance  Dynamic Sitting-Comments: sit at edge of bed with supervision. Only able to maintain good posture for about 1 minute. Then sits propped on elbow in left lean.  Dynamic Standing Balance  Dynamic Standing-Comments: mod assist to maintain standing balance    Functional Assessments:     Bed  Mobility  Bed Mobility: Yes  Bed Mobility 1  Bed Mobility 1: Supine to sitting, Sitting to supine, Rolling right, Rolling left  Level of Assistance 1: Maximum assistance, +2  Bed Mobility Comments 1: supine to sit with max assist x2 for LEs and trunk, patient wtih delayed processing to commands and became less assist as mobility progressed. Sit to supine with max assist x2. Rolling with max assist with constant cues. Bed changed completed and assisted with pericare. Incontient of stool during session.  Transfers  Transfer: Yes  Transfer 1  Technique 1: Stand to sit, Sit to stand  Transfer Level of Assistance 1: Maximum assistance, +2  Trials/Comments 1: max assist x2 for sit to stand for intial stand. Patient then completed sit to stand with mod assist x1 and was able to stand for 1 minute with mod assist with flexed posture.  Ambulation/Gait Training  Ambulation/Gait Training Performed: No (unsafe to attempt ambulation at this time)          Extremity/Trunk Assessments:        RLE   RLE : Within Functional Limits (patient able to move LEs against gravity. Unable to to MMT due to patient unable to understand/follow MMT commands)  LLE   LLE : Within Functional Limits (patient able to move LEs against gravity. Unable to to MMT due to patient unable to understand/follow MMT commands)    Outcome Measures:     [facility] Basic Mobility  Turning from your back to your side while in a flat bed without using bedrails: Total  Moving from lying on your back to sitting on the side of a flat bed without using bedrails: Total  Moving to and from bed to chair (including a wheelchair): Total  Standing up from a chair using your arms (e.g. wheelchair or bedside chair): Total  To walk in hospital room: Total  Climbing 3-5 steps with railing: Total  Basic Mobility - Total Score: 6              Goals:  Encounter Problems       Encounter Problems (Active)       PT Problem       Patient will perform supine to sit and sit to supine with min  assist (Progressing)       Start:  05/21/25    Expected End:  06/04/25            Patient will perform sit to stand and stand pivot transfer with min assist with walker (Progressing)       Start:  05/21/25    Expected End:  06/04/25            Patient will ambulate 20' with wheeled walker and min assist (Progressing)       Start:  05/21/25    Expected End:  06/04/25               Pain - Adult            Education Documentation  Body Mechanics, taught by Eleanor Cadena PT at 5/21/2025  4:01 PM.  Learner: Family, Patient  Readiness: Acceptance  Method: Explanation  Response: Verbalizes Understanding, Needs Reinforcement  Comment: safe mobility, up with assist    Mobility Training, taught by Eleanor Cadena, PT at 5/21/2025  4:01 PM.  Learner: Family, Patient  Readiness: Acceptance  Method: Explanation  Response: Verbalizes Understanding, Needs Reinforcement  Comment: safe mobility, up with assist    Education Comments  No comments found.              [1]   Patient Active Problem List  Diagnosis    Atrial fibrillation (Multi)    Bilateral sensorineural hearing loss    Congestive heart failure    Generalized osteoarthritis of hand    Generalized osteoarthritis of multiple sites    Hypertension    Other hyperlipidemia    COPD mixed type (Multi)

## 2025-05-21 NOTE — CARE PLAN
The patient's goals for the shift include Patient will remain safe and free from injury and falls during shift    The clinical goals for the shift include Pt will remain safe and free of fall/injury during this shift    Problem: Safety - Adult  Goal: Free from fall injury  Outcome: Progressing     Problem: Skin  Goal: Participates in plan/prevention/treatment measures  Outcome: Progressing  Goal: Prevent/manage excess moisture  Outcome: Progressing  Goal: Prevent/minimize sheer/friction injuries  Outcome: Progressing  Flowsheets (Taken 5/21/2025 1412)  Prevent/minimize sheer/friction injuries:   Complete micro-shifts as needed if patient unable. Adjust patient position to relieve pressure points, not a full turn   Turn/reposition every 2 hours/use positioning/transfer devices

## 2025-05-21 NOTE — PROGRESS NOTES
"Susan Patiño is a 89 y.o. female on day 5 of admission presenting with Altered mental status, unspecified altered mental status type.      Subjective   Seen and examined, daughter and grandson at bedside. Has nausea on and off, feels SoB on exertion but not at rest, has poor appetite. Family feels that metal status is at baseline   C/o back pain, requesting a heating pad    Objective   Vitals 24HR  Heart Rate:  [68-85]   Temp:  [36.4 °C (97.5 °F)-37.4 °C (99.3 °F)]   Resp:  [16-18]   BP: (111-145)/()   SpO2:  [94 %-99 %]     Intake/Output last 3 Shifts:    Intake/Output Summary (Last 24 hours) at 5/21/2025 1421  Last data filed at 5/21/2025 1100  Gross per 24 hour   Intake 350 ml   Output 550 ml   Net -200 ml   24 hr urine output 450 ml, 350 so far today    Physical Exam    No distress  HEENT:  moist, no pallor  No edema soheila LE  Breath sounds soheila equal, clear  S1 S2 regular, normal, no rub or murmur  Abdomen soft  AAO x3, non focal     Most recent labs:   Results from last 7 days   Lab Units 05/21/25  1007 05/20/25  0635 05/15/25  1847   WBC AUTO x10*3/uL 6.5 6.7 3.8*   HEMOGLOBIN g/dL 10.5* 10.5* 10.3*   HEMATOCRIT % 33.8* 35.2* 33.5*   PLATELETS AUTO x10*3/uL 81* 87* 77*     Results from last 7 days   Lab Units 05/21/25  1007 05/20/25  0634 05/19/25  0624 05/18/25  0642 05/17/25  0527   SODIUM mmol/L 141 141 141   < > 144   CO2 mmol/L 26 24 25   < > 26   ANION GAP mmol/L 15 17 16   < > 16   BUN mg/dL 63* 58* 51*   < > 30*   CREATININE mg/dL 5.31* 5.29* 4.57*   < > 1.71*   GLUCOSE mg/dL 89 93 106*   < > 80   CALCIUM mg/dL 8.7 8.9 9.2   < > 9.6   MAGNESIUM mg/dL  --   --   --   --  1.98   PHOSPHORUS mg/dL  --  6.0*  --   --   --     < > = values in this interval not displayed.      Results from last 7 days   Lab Units 05/21/25  1007 05/15/25  1847   ALT U/L 21 10   AST U/L 30 17   ALK PHOS U/L 101 81            No lab exists for component: \"PT\"  Results from last 7 days   Lab Units 05/15/25  1847   FIO2 " "% 36     Results from last 7 days   Lab Units 05/15/25  1847   POCT PH, VENOUS pH 7.32*   POCT PCO2, VENOUS mm Hg 43           No lab exists for component: \"BNPRESU\", \"CPKT\"  Results from last 7 days   Lab Units 05/15/25  1847   LIPASE U/L 64       Scheduled Medications[1]    apixaban, 2.5 mg, oral, BID  atorvastatin, 40 mg, oral, Nightly  brimonidine, 1 drop, Left Eye, BID  cefTRIAXone, 2 g, intravenous, q24h  cholecalciferol, 50 mcg, oral, Daily  dorzolamide-timoloL, 1 drop, Left Eye, BID  latanoprost, 1 drop, Left Eye, Nightly  [Held by provider] lisinopril, 10 mg, oral, Daily  metoprolol succinate XL, 100 mg, oral, Daily  mirtazapine, 7.5 mg, oral, Nightly  sennosides-docusate sodium, 2 tablet, oral, BID  sertraline, 25 mg, oral, Daily  sodium chloride, 1 spray, Each Nostril, BID       Assessment & Plan    89 year old with CKD 3 at baseline ( creatinine 1.3) admitted with UTI and altered mental status, with non-oliguric CODY  Nonoliguric CODY likely secondary to contrast associated nephropathy with hemodynamic insult.  I believe she has established ATN, further IV fluid therapy is unlikely to help.  Please discontinue LR, encourage p.o. intake.  Please discontinue Crawford catheter, can place external catheter for output measurement.  Renal function seems to be plateauing, hopeful for recovery within the next several days.  Currently no uremic signs or symptoms, no indication for RRT.  Discussed with patient, her daughter and her grandson that we will need to see improvement in her kidney function prior to discharge.  All of their questions answered to the satisfaction.  Continue to avoid nephrotoxins, dose all medications for GFR less than 15 mL/min.  Avoid further contrast exposure.  We will continue to follow.           [1] apixaban, 2.5 mg, oral, BID  atorvastatin, 40 mg, oral, Nightly  brimonidine, 1 drop, Left Eye, BID  cefTRIAXone, 2 g, intravenous, q24h  cholecalciferol, 50 mcg, oral, " Daily  dorzolamide-timoloL, 1 drop, Left Eye, BID  latanoprost, 1 drop, Left Eye, Nightly  [Held by provider] lisinopril, 10 mg, oral, Daily  metoprolol succinate XL, 100 mg, oral, Daily  mirtazapine, 7.5 mg, oral, Nightly  sennosides-docusate sodium, 2 tablet, oral, BID  sertraline, 25 mg, oral, Daily  sodium chloride, 1 spray, Each Nostril, BID

## 2025-05-21 NOTE — PROGRESS NOTES
Patient is long term resident at [facility] and will return when medically cleared. No precert needed. ADOD 2-3 days pending creatinine labs.    JADA Vincent

## 2025-05-22 LAB
ALBUMIN SERPL BCP-MCNC: 3.3 G/DL (ref 3.4–5)
ALP SERPL-CCNC: 106 U/L (ref 33–136)
ALT SERPL W P-5'-P-CCNC: 32 U/L (ref 7–45)
ANION GAP SERPL CALC-SCNC: 19 MMOL/L (ref 10–20)
AST SERPL W P-5'-P-CCNC: 44 U/L (ref 9–39)
BILIRUB SERPL-MCNC: 0.4 MG/DL (ref 0–1.2)
BUN SERPL-MCNC: 62 MG/DL (ref 6–23)
CALCIUM SERPL-MCNC: 9.1 MG/DL (ref 8.6–10.6)
CHLORIDE SERPL-SCNC: 105 MMOL/L (ref 98–107)
CO2 SERPL-SCNC: 22 MMOL/L (ref 21–32)
CREAT SERPL-MCNC: 4.61 MG/DL (ref 0.5–1.05)
EGFRCR SERPLBLD CKD-EPI 2021: 8 ML/MIN/1.73M*2
ERYTHROCYTE [DISTWIDTH] IN BLOOD BY AUTOMATED COUNT: 15.7 % (ref 11.5–14.5)
GLUCOSE SERPL-MCNC: 94 MG/DL (ref 74–99)
HCT VFR BLD AUTO: 35.6 % (ref 36–46)
HGB BLD-MCNC: 10.8 G/DL (ref 12–16)
MCH RBC QN AUTO: 28.3 PG (ref 26–34)
MCHC RBC AUTO-ENTMCNC: 30.3 G/DL (ref 32–36)
MCV RBC AUTO: 93 FL (ref 80–100)
NRBC BLD-RTO: 0 /100 WBCS (ref 0–0)
PLATELET # BLD AUTO: 84 X10*3/UL (ref 150–450)
POTASSIUM SERPL-SCNC: 3.7 MMOL/L (ref 3.5–5.3)
PROT SERPL-MCNC: 6 G/DL (ref 6.4–8.2)
RBC # BLD AUTO: 3.81 X10*6/UL (ref 4–5.2)
SODIUM SERPL-SCNC: 142 MMOL/L (ref 136–145)
WBC # BLD AUTO: 5.7 X10*3/UL (ref 4.4–11.3)

## 2025-05-22 PROCEDURE — 80053 COMPREHEN METABOLIC PANEL: CPT | Performed by: STUDENT IN AN ORGANIZED HEALTH CARE EDUCATION/TRAINING PROGRAM

## 2025-05-22 PROCEDURE — 36415 COLL VENOUS BLD VENIPUNCTURE: CPT | Performed by: STUDENT IN AN ORGANIZED HEALTH CARE EDUCATION/TRAINING PROGRAM

## 2025-05-22 PROCEDURE — 99232 SBSQ HOSP IP/OBS MODERATE 35: CPT | Performed by: STUDENT IN AN ORGANIZED HEALTH CARE EDUCATION/TRAINING PROGRAM

## 2025-05-22 PROCEDURE — 85027 COMPLETE CBC AUTOMATED: CPT | Performed by: STUDENT IN AN ORGANIZED HEALTH CARE EDUCATION/TRAINING PROGRAM

## 2025-05-22 PROCEDURE — 2500000004 HC RX 250 GENERAL PHARMACY W/ HCPCS (ALT 636 FOR OP/ED): Mod: JZ | Performed by: STUDENT IN AN ORGANIZED HEALTH CARE EDUCATION/TRAINING PROGRAM

## 2025-05-22 PROCEDURE — 1200000002 HC GENERAL ROOM WITH TELEMETRY DAILY

## 2025-05-22 PROCEDURE — 2500000001 HC RX 250 WO HCPCS SELF ADMINISTERED DRUGS (ALT 637 FOR MEDICARE OP): Performed by: STUDENT IN AN ORGANIZED HEALTH CARE EDUCATION/TRAINING PROGRAM

## 2025-05-22 PROCEDURE — 2500000002 HC RX 250 W HCPCS SELF ADMINISTERED DRUGS (ALT 637 FOR MEDICARE OP, ALT 636 FOR OP/ED): Performed by: STUDENT IN AN ORGANIZED HEALTH CARE EDUCATION/TRAINING PROGRAM

## 2025-05-22 RX ORDER — CEFTRIAXONE 2 G/50ML
2 INJECTION, SOLUTION INTRAVENOUS EVERY 24 HOURS
Status: COMPLETED | OUTPATIENT
Start: 2025-05-22 | End: 2025-05-22

## 2025-05-22 RX ADMIN — SENNOSIDES AND DOCUSATE SODIUM 2 TABLET: 50; 8.6 TABLET ORAL at 21:16

## 2025-05-22 RX ADMIN — SERTRALINE HYDROCHLORIDE 25 MG: 50 TABLET ORAL at 09:31

## 2025-05-22 RX ADMIN — SALINE NASAL SPRAY 1 SPRAY: 1.5 SOLUTION NASAL at 21:15

## 2025-05-22 RX ADMIN — SALINE NASAL SPRAY 1 SPRAY: 1.5 SOLUTION NASAL at 09:30

## 2025-05-22 RX ADMIN — Medication 50 MCG: at 09:30

## 2025-05-22 RX ADMIN — METOPROLOL SUCCINATE 100 MG: 100 TABLET, EXTENDED RELEASE ORAL at 09:29

## 2025-05-22 RX ADMIN — BRIMONIDINE TARTRATE 1 DROP: 2 SOLUTION/ DROPS OPHTHALMIC at 09:33

## 2025-05-22 RX ADMIN — LATANOPROST 1 DROP: 50 SOLUTION OPHTHALMIC at 21:16

## 2025-05-22 RX ADMIN — CEFTRIAXONE SODIUM 2 G: 2 INJECTION, SOLUTION INTRAVENOUS at 21:15

## 2025-05-22 RX ADMIN — BRIMONIDINE TARTRATE 1 DROP: 2 SOLUTION/ DROPS OPHTHALMIC at 21:16

## 2025-05-22 RX ADMIN — ATORVASTATIN CALCIUM 40 MG: 40 TABLET, FILM COATED ORAL at 21:16

## 2025-05-22 RX ADMIN — SENNOSIDES AND DOCUSATE SODIUM 2 TABLET: 50; 8.6 TABLET ORAL at 09:29

## 2025-05-22 RX ADMIN — APIXABAN 2.5 MG: 2.5 TABLET, FILM COATED ORAL at 21:16

## 2025-05-22 RX ADMIN — APIXABAN 2.5 MG: 2.5 TABLET, FILM COATED ORAL at 09:30

## 2025-05-22 RX ADMIN — MIRTAZAPINE 7.5 MG: 15 TABLET, FILM COATED ORAL at 21:16

## 2025-05-22 RX ADMIN — DORZOLAMIDE HYDROCHLORIDE AND TIMOLOL MALEATE 1 DROP: 20; 5 SOLUTION OPHTHALMIC at 09:34

## 2025-05-22 RX ADMIN — DORZOLAMIDE HYDROCHLORIDE AND TIMOLOL MALEATE 1 DROP: 20; 5 SOLUTION OPHTHALMIC at 21:15

## 2025-05-22 ASSESSMENT — COGNITIVE AND FUNCTIONAL STATUS - GENERAL
PERSONAL GROOMING: A LOT
DAILY ACTIVITIY SCORE: 12
MOVING TO AND FROM BED TO CHAIR: A LOT
TOILETING: A LOT
TURNING FROM BACK TO SIDE WHILE IN FLAT BAD: A LOT
DRESSING REGULAR LOWER BODY CLOTHING: A LOT
MOVING FROM LYING ON BACK TO SITTING ON SIDE OF FLAT BED WITH BEDRAILS: A LOT
WALKING IN HOSPITAL ROOM: TOTAL
MOBILITY SCORE: 10
HELP NEEDED FOR BATHING: A LOT
EATING MEALS: A LOT
STANDING UP FROM CHAIR USING ARMS: A LOT
CLIMB 3 TO 5 STEPS WITH RAILING: TOTAL
DRESSING REGULAR UPPER BODY CLOTHING: A LOT

## 2025-05-22 ASSESSMENT — PAIN - FUNCTIONAL ASSESSMENT: PAIN_FUNCTIONAL_ASSESSMENT: CPOT (CRITICAL CARE PAIN OBSERVATION TOOL)

## 2025-05-22 ASSESSMENT — PAIN SCALES - GENERAL
PAINLEVEL_OUTOF10: 2
PAINLEVEL_OUTOF10: 0 - NO PAIN
PAINLEVEL_OUTOF10: 0 - NO PAIN

## 2025-05-22 NOTE — CARE PLAN
The patient's goals for the shift include Patient will remain safe and free from injury and falls during shift    Problem: Pain - Adult  Goal: Verbalizes/displays adequate comfort level or baseline comfort level  Outcome: Progressing     Problem: Nutrition  Goal: Nutrient intake appropriate for maintaining nutritional needs  Outcome: Progressing     Problem: Skin  Goal: Prevent/manage excess moisture  Outcome: Progressing  Flowsheets (Taken 5/22/2025 0487)  Prevent/manage excess moisture:   Follow provider orders for dressing changes   Moisturize dry skin  Goal: Promote/optimize nutrition  Outcome: Progressing     Problem: Fall/Injury  Goal: Not fall by end of shift  Outcome: Progressing  Goal: Be free from injury by end of the shift  Outcome: Progressing

## 2025-05-22 NOTE — PROGRESS NOTES
Physical Therapy                 Therapy Communication Note    Patient Name: Susan Patiño  MRN: [id number]  Department: [place] [address]  Room: 60/6067-A  Today's Date: 5/22/2025     Discipline: Physical Therapy    Missed Visit: PT Missed Visit: Yes     Missed Visit Reason: Missed Visit Reason: Patient sleeping (Daughter asked that we allow pt to sleep. Will reattempt as schedule permits.)    Missed Time: Attempt    Comment:

## 2025-05-22 NOTE — CARE PLAN
The patient's goals for the shift include Patient will remain safe and free from injury and falls during shift    The clinical goals for the shift include patient will have an adequate nutrition with in the shift      Problem: Nutrition  Goal: Nutrient intake appropriate for maintaining nutritional needs  Outcome: Progressing     Problem: Skin  Goal: Prevent/manage excess moisture  Outcome: Progressing     Problem: Skin  Goal: Promote/optimize nutrition  Outcome: Progressing

## 2025-05-22 NOTE — CARE PLAN
The patient's goals for the shift include Patient will remain safe and free from injury and falls during shift    The clinical goals for the shift include patient will have an adequate nutrition with in the shift      Problem: Skin  Goal: Decreased wound size/increased tissue granulation at next dressing change  5/22/2025 1509 by Lyndsay Flores RN  Flowsheets (Taken 5/22/2025 1509)  Decreased wound size/increased tissue granulation at next dressing change: Protective dressings over bony prominences  5/22/2025 1507 by Lyndsay Flores RN  Outcome: Progressing  Goal: Participates in plan/prevention/treatment measures  Outcome: Progressing  Goal: Prevent/manage excess moisture  Outcome: Progressing  Goal: Prevent/minimize sheer/friction injuries  Outcome: Progressing  Goal: Promote/optimize nutrition  Outcome: Progressing  Goal: Promote skin healing  Outcome: Progressing

## 2025-05-22 NOTE — PROGRESS NOTES
INTERNAL MEDICINE PROGRESS NOTE     BRIEF NARRATIVE      Susan Patiño is a 89 y.o. female on day 6 of admission presenting with Altered mental status, unspecified altered mental status type.    ASSESSMENT / PLANS      Acute kidney injury  Acute urinary retention   -Creatinine 0.99 on 5/15/25 . Cr 5.29 --> 5.31 ---> 4.61 today   -Etiology multifactorial, likely ATN. ddx include diuresis, BLAINE vs AIN,  less likely cardiorenal   -Renal ultrasound noted bilateral kidney echogenicity, multiple cysts, no evidence of hydronephrosis    -Nephrology following, no indication for RRT   -Cr improving today, UOP also improving   -Will hold IVF for now, encourage oral intake.   -Daily RFPs, avoid nephrotoxic agents     Acute metabolic encephalopathy 2/2 UTI   Delirium   Generalized weakness   -No significant cognitive issues at baseline  -CTH non acute   -Encephalopathy presumed secondary to UTI   -Now improving but not fully at baseline   -PT/OT daily     Acute cystitis   Klebsiella UTI with pyuria   -Continue IV Rocephin.  -Urine culture from 5/15 growing Klebsiella pneumonia. Will treat with ceftriaxone for 7 days (EOT 5/22)   -CTM         Acute on chronic diastolic CHF exacerbation  -BNP elevated to the 200s, chest imaging showing pulmonary edema, hypoxia and bilateral lower extremity edema  -Status post Lasix 40 mg IV in the emergency department 5/15 night and another dose of Lasix 20 mg IV x 1 on 5/16  -Continue holding diuresis, last diuresis 5/16, 2/2 renal impairment      Acute on chronic hypoxic respiratory failure on home oxygen  -Baseline oxygen is 2 L O2 nasal cannula as needed.  -Patient initially presented with requirement of 6 L  O2 nasal cannula.  Now currently down to 1 L O2 nasal cannula.  Continue weaning oxygen as tolerated.     Essential HTN   Hypotension   BP now stable. Cont to monitor closely to avoid hypotension in the setting of possible ATN     CT imaging with AAA and aortic dissection  -Vascular surgery consulted no vascular surgery intervention recommended AAA and aortic dissection appears chronic.     History of paroxysmal A-fib  -Currently rate controlled A-fib.  -Continue apixaban 2.5 mg twice daily  -Continue home metoprolol succinate 100 mg daily     HLD  - Continue home atorvastatin 40 mg nightly     Glaucoma  - Continue home drops     Disposition: Patient mentally has improved, but renal function demonstrating progressive worsening.      SUBJECTIVE       Pt appeared more alert and awake today with good urine output. Less agitated overnight     OBJECTIVE      Visit Vitals  /71   Pulse 60   Temp 36.5 °C (97.7 °F)   Resp 17        Intake/Output Summary (Last 24 hours) at 5/22/2025 1038  Last data filed at 5/22/2025 0900  Gross per 24 hour   Intake 359.17 ml   Output 700 ml   Net -340.83 ml       Physical Exam   General: Lying in bed without distress.  Frail-appearing.  Cooperative.  Mildly hard of hearing.  Skin: No rashes or ulcerations.  HEENT: Sclera is white.  Mucous membranes moist.  Neck: Supple.  No JVD.  Cardiac: Regular rate and rhythm, S1/S2 normal.  Lungs: Clear to auscultation bilaterally, no wheezing or crackles, no accessory muscle use at rest.  Abdomen: Soft, nontender, nondistended, BS +  Extremities: No cyanosis.  No lower extremity edema.  Neurologic: Alert, responsive and answering questions today, oriented to self, partially to place, not to time.  No focal deficits.  Psychiatric: Appropriate mood and behavior.  Currently no agitation.    Current Meds   Scheduled Medications[1]   PRN Medications[2]     LABS and IMAGING     WBC   Date Value Ref Range Status   05/22/2025 5.7 4.4 - 11.3 x10*3/uL Final    05/21/2025 6.5 4.4 - 11.3 x10*3/uL Final   05/20/2025 6.7 4.4 - 11.3 x10*3/uL Final     Hemoglobin   Date Value Ref Range Status   05/22/2025 10.8 (L) 12.0 - 16.0 g/dL Final   05/21/2025 10.5 (L) 12.0 - 16.0 g/dL Final   05/20/2025 10.5 (L) 12.0 - 16.0 g/dL Final     Hematocrit   Date Value Ref Range Status   05/22/2025 35.6 (L) 36.0 - 46.0 % Final   05/21/2025 33.8 (L) 36.0 - 46.0 % Final   05/20/2025 35.2 (L) 36.0 - 46.0 % Final     Bicarbonate   Date Value Ref Range Status   05/22/2025 22 21 - 32 mmol/L Final   05/21/2025 26 21 - 32 mmol/L Final   05/20/2025 24 21 - 32 mmol/L Final     Creatinine   Date Value Ref Range Status   05/22/2025 4.61 (H) 0.50 - 1.05 mg/dL Final   05/21/2025 5.31 (H) 0.50 - 1.05 mg/dL Final   05/20/2025 5.29 (H) 0.50 - 1.05 mg/dL Final     Calcium   Date Value Ref Range Status   05/22/2025 9.1 8.6 - 10.6 mg/dL Final   05/21/2025 8.7 8.6 - 10.6 mg/dL Final   05/20/2025 8.9 8.6 - 10.6 mg/dL Final       US renal complete  Narrative: Interpreted By:  Reed Hernandez and Hofer Lindsay   STUDY:  US RENAL COMPLETE;  5/19/2025 1:53 pm      INDICATION:  Signs/Symptoms:progressive CODY.          COMPARISON:  CT chest abdomen and pelvis 05/15/2025.      ACCESSION NUMBER(S):  [id number]      ORDERING CLINICIAN:  CORRIE YORK      TECHNIQUE:  Multiple images of the kidneys were obtained.      FINDINGS:  Limitations due to bowel gas and body habitus.      RIGHT KIDNEY:  The right kidney measures 10.92 cm in length. The renal cortex is  echogenic. No hydronephrosis is present. No evidence of  nephrolithiasis. Multiple anechoic structures with posterior acoustic  enhancement and no internal vascularity, incompletely characterized  on ultrasound, but most consistent with simple renal cysts measuring  up to 4.6 x 3.1 x 3.3 cm. The previously seen enhancing lesion within  the right kidney is not definitively visualized on ultrasound, likely  due to limited exam.      LEFT KIDNEY:  The  left kidney is poorly visualized due to bowel gas and body  habitus. The visualized left kidney measures 9.83 cm in length. The  renal cortex is echogenic. No hydronephrosis is present. No evidence  of nephrolithiasis. Hypoechoic structure within the left kidney with  posterior acoustic enhancement, which is incompletely evaluated on  ultrasound, but most consistent with a simple renal cyst.      BLADDER:  The urinary bladder is unremarkable in appearance.      Impression: 1. The previously described enhancing nodule in the right kidney from  prior CT chest abdomen and pelvis dated 05/15/2025 is not  definitively visualized on ultrasound examination, likely due to  limitations above. Consider MRI for further characterization in case  of persistent concern.  2. The bilateral kidneys are echogenic, which can be seen in the  setting of medical renal disease. Limited evaluation of the left  kidney as above. Multiple cysts throughout the bilateral kidneys. No  evidence of hydronephrosis.      I personally reviewed the images/study and resident's interpretation  and I agree with the findings as stated by Scarlett Gallego MD (resident  radiologist). This study was analyzed and interpreted at [place], [place].      MACRO:  None          Signed by: Reed Miramontes 5/19/2025 10:06 PM  Dictation workstation:   [id number]         PROBLEM LISTS      Problem List Items Addressed This Visit          Neuro    * (Principal) RESOLVED: Altered mental status, unspecified altered mental status type - Primary     Other Visit Diagnoses         Acute cystitis without hematuria        Relevant Medications    acetaminophen (Tylenol) 325 mg tablet    cephalexin (Keflex) 500 mg capsule            This dictation was created with voice recognition software. Although every effort is made to review the dictation as it is transcribed, on occasion spoken words, phrases, names, numbers,  punctuation etc can be misinterpreted by the the technology leading to omissions or inappropriate outcomes.     Sandra Ward MD                 [1] apixaban, 2.5 mg, oral, BID  atorvastatin, 40 mg, oral, Nightly  brimonidine, 1 drop, Left Eye, BID  cefTRIAXone, 2 g, intravenous, q24h  cholecalciferol, 50 mcg, oral, Daily  dorzolamide-timoloL, 1 drop, Left Eye, BID  latanoprost, 1 drop, Left Eye, Nightly  [Held by provider] lisinopril, 10 mg, oral, Daily  metoprolol succinate XL, 100 mg, oral, Daily  mirtazapine, 7.5 mg, oral, Nightly  sennosides-docusate sodium, 2 tablet, oral, BID  sertraline, 25 mg, oral, Daily  sodium chloride, 1 spray, Each Nostril, BID     [2] PRN medications: acetaminophen **OR** acetaminophen **OR** acetaminophen, diclofenac sodium, ipratropium-albuteroL, labetaloL, OLANZapine zydis **OR** OLANZapine, ondansetron **OR** ondansetron

## 2025-05-23 LAB
ALBUMIN SERPL BCP-MCNC: 3 G/DL (ref 3.4–5)
ALP SERPL-CCNC: 125 U/L (ref 33–136)
ALT SERPL W P-5'-P-CCNC: 37 U/L (ref 7–45)
ANION GAP SERPL CALC-SCNC: 14 MMOL/L (ref 10–20)
AST SERPL W P-5'-P-CCNC: 46 U/L (ref 9–39)
BILIRUB SERPL-MCNC: 0.4 MG/DL (ref 0–1.2)
BUN SERPL-MCNC: 54 MG/DL (ref 6–23)
CALCIUM SERPL-MCNC: 8.7 MG/DL (ref 8.6–10.6)
CHLORIDE SERPL-SCNC: 106 MMOL/L (ref 98–107)
CO2 SERPL-SCNC: 23 MMOL/L (ref 21–32)
CREAT SERPL-MCNC: 3.16 MG/DL (ref 0.5–1.05)
EGFRCR SERPLBLD CKD-EPI 2021: 13 ML/MIN/1.73M*2
ERYTHROCYTE [DISTWIDTH] IN BLOOD BY AUTOMATED COUNT: 15.3 % (ref 11.5–14.5)
GLUCOSE SERPL-MCNC: 84 MG/DL (ref 74–99)
HCT VFR BLD AUTO: 33.5 % (ref 36–46)
HGB BLD-MCNC: 10.4 G/DL (ref 12–16)
MCH RBC QN AUTO: 28.1 PG (ref 26–34)
MCHC RBC AUTO-ENTMCNC: 31 G/DL (ref 32–36)
MCV RBC AUTO: 91 FL (ref 80–100)
NRBC BLD-RTO: 0 /100 WBCS (ref 0–0)
PLATELET # BLD AUTO: 101 X10*3/UL (ref 150–450)
POTASSIUM SERPL-SCNC: 3.4 MMOL/L (ref 3.5–5.3)
PROT SERPL-MCNC: 5.9 G/DL (ref 6.4–8.2)
RBC # BLD AUTO: 3.7 X10*6/UL (ref 4–5.2)
SODIUM SERPL-SCNC: 140 MMOL/L (ref 136–145)
WBC # BLD AUTO: 7.4 X10*3/UL (ref 4.4–11.3)

## 2025-05-23 PROCEDURE — 80053 COMPREHEN METABOLIC PANEL: CPT | Performed by: STUDENT IN AN ORGANIZED HEALTH CARE EDUCATION/TRAINING PROGRAM

## 2025-05-23 PROCEDURE — 1210000001 HC SEMI-PRIVATE ROOM DAILY

## 2025-05-23 PROCEDURE — 2500000002 HC RX 250 W HCPCS SELF ADMINISTERED DRUGS (ALT 637 FOR MEDICARE OP, ALT 636 FOR OP/ED): Performed by: STUDENT IN AN ORGANIZED HEALTH CARE EDUCATION/TRAINING PROGRAM

## 2025-05-23 PROCEDURE — 2500000001 HC RX 250 WO HCPCS SELF ADMINISTERED DRUGS (ALT 637 FOR MEDICARE OP): Performed by: STUDENT IN AN ORGANIZED HEALTH CARE EDUCATION/TRAINING PROGRAM

## 2025-05-23 PROCEDURE — 97530 THERAPEUTIC ACTIVITIES: CPT | Mod: GP,CQ

## 2025-05-23 PROCEDURE — 36415 COLL VENOUS BLD VENIPUNCTURE: CPT | Performed by: STUDENT IN AN ORGANIZED HEALTH CARE EDUCATION/TRAINING PROGRAM

## 2025-05-23 PROCEDURE — 99233 SBSQ HOSP IP/OBS HIGH 50: CPT | Performed by: STUDENT IN AN ORGANIZED HEALTH CARE EDUCATION/TRAINING PROGRAM

## 2025-05-23 PROCEDURE — 85027 COMPLETE CBC AUTOMATED: CPT | Performed by: STUDENT IN AN ORGANIZED HEALTH CARE EDUCATION/TRAINING PROGRAM

## 2025-05-23 RX ADMIN — BRIMONIDINE TARTRATE 1 DROP: 2 SOLUTION/ DROPS OPHTHALMIC at 08:31

## 2025-05-23 RX ADMIN — Medication 50 MCG: at 08:29

## 2025-05-23 RX ADMIN — DORZOLAMIDE HYDROCHLORIDE AND TIMOLOL MALEATE 1 DROP: 20; 5 SOLUTION OPHTHALMIC at 21:11

## 2025-05-23 RX ADMIN — APIXABAN 2.5 MG: 2.5 TABLET, FILM COATED ORAL at 21:10

## 2025-05-23 RX ADMIN — ATORVASTATIN CALCIUM 40 MG: 40 TABLET, FILM COATED ORAL at 21:10

## 2025-05-23 RX ADMIN — SALINE NASAL SPRAY 1 SPRAY: 1.5 SOLUTION NASAL at 21:10

## 2025-05-23 RX ADMIN — APIXABAN 2.5 MG: 2.5 TABLET, FILM COATED ORAL at 08:29

## 2025-05-23 RX ADMIN — METOPROLOL SUCCINATE 100 MG: 100 TABLET, EXTENDED RELEASE ORAL at 08:29

## 2025-05-23 RX ADMIN — BRIMONIDINE TARTRATE 1 DROP: 2 SOLUTION/ DROPS OPHTHALMIC at 21:11

## 2025-05-23 RX ADMIN — ACETAMINOPHEN 650 MG: 325 TABLET, FILM COATED ORAL at 17:35

## 2025-05-23 RX ADMIN — DORZOLAMIDE HYDROCHLORIDE AND TIMOLOL MALEATE 1 DROP: 20; 5 SOLUTION OPHTHALMIC at 08:28

## 2025-05-23 RX ADMIN — LATANOPROST 1 DROP: 50 SOLUTION OPHTHALMIC at 21:10

## 2025-05-23 RX ADMIN — MIRTAZAPINE 7.5 MG: 15 TABLET, FILM COATED ORAL at 21:10

## 2025-05-23 RX ADMIN — SENNOSIDES AND DOCUSATE SODIUM 2 TABLET: 50; 8.6 TABLET ORAL at 08:29

## 2025-05-23 RX ADMIN — SERTRALINE HYDROCHLORIDE 25 MG: 50 TABLET ORAL at 08:29

## 2025-05-23 ASSESSMENT — COGNITIVE AND FUNCTIONAL STATUS - GENERAL
CLIMB 3 TO 5 STEPS WITH RAILING: A LOT
DRESSING REGULAR LOWER BODY CLOTHING: A LOT
WALKING IN HOSPITAL ROOM: A LOT
WALKING IN HOSPITAL ROOM: TOTAL
DRESSING REGULAR UPPER BODY CLOTHING: A LOT
TURNING FROM BACK TO SIDE WHILE IN FLAT BAD: A LOT
MOBILITY SCORE: 12
TURNING FROM BACK TO SIDE WHILE IN FLAT BAD: A LOT
MOVING FROM LYING ON BACK TO SITTING ON SIDE OF FLAT BED WITH BEDRAILS: A LOT
CLIMB 3 TO 5 STEPS WITH RAILING: TOTAL
MOVING TO AND FROM BED TO CHAIR: A LOT
MOVING TO AND FROM BED TO CHAIR: A LOT
STANDING UP FROM CHAIR USING ARMS: A LOT
STANDING UP FROM CHAIR USING ARMS: A LOT
EATING MEALS: A LOT
MOVING FROM LYING ON BACK TO SITTING ON SIDE OF FLAT BED WITH BEDRAILS: A LITTLE
HELP NEEDED FOR BATHING: A LOT
DAILY ACTIVITIY SCORE: 12
TOILETING: A LOT
PERSONAL GROOMING: A LOT
MOBILITY SCORE: 11

## 2025-05-23 ASSESSMENT — PAIN SCALES - WONG BAKER
WONGBAKER_NUMERICALRESPONSE: NO HURT
WONGBAKER_NUMERICALRESPONSE: NO HURT

## 2025-05-23 ASSESSMENT — PAIN SCALES - GENERAL
PAINLEVEL_OUTOF10: 0 - NO PAIN

## 2025-05-23 ASSESSMENT — PAIN - FUNCTIONAL ASSESSMENT: PAIN_FUNCTIONAL_ASSESSMENT: 0-10

## 2025-05-23 NOTE — PROGRESS NOTES
.                                                                                                                                                                                                                                                                                             INTERNAL MEDICINE PROGRESS NOTE     BRIEF NARRATIVE      Susan Patiño is a 89 y.o. female on day 7 of admission presenting with Altered mental status, unspecified altered mental status type.    ASSESSMENT / PLANS      Acute kidney injury  Acute urinary retention   -Creatinine 0.99 on 5/15/25 . Cr 5.29 --> 5.31 ---> 4.61 today   -Etiology multifactorial, likely ATN. ddx include diuresis, BLAINE vs AIN,  less likely cardiorenal   -Renal ultrasound noted bilateral kidney echogenicity, multiple cysts, no evidence of hydronephrosis    -Nephrology following, no indication for RRT. Discussed with nephro today   -Cr improving today, UOP also improving   -Will hold IVF for now, encourage oral intake.   -Daily RFPs, avoid nephrotoxic agents     Acute metabolic encephalopathy 2/2 UTI   Delirium   Generalized weakness   -No significant cognitive issues at baseline  -CTH non acute   -Encephalopathy presumed secondary to UTI   -Now improving but not fully at baseline   -PT/OT daily, recommended moderate intensity. Discussed with family today   -Discussed with [facility] regarding discharge dispo      Acute cystitis   Klebsiella UTI with pyuria   -Continue IV Rocephin.  -Urine culture from 5/15 growing Klebsiella pneumonia. Will treat with ceftriaxone for 7 days (EOT 5/22)   -CTM         Acute on chronic diastolic CHF exacerbation  -BNP elevated to the 200s, chest imaging showing pulmonary edema, hypoxia and bilateral lower extremity edema  -Status post Lasix 40 mg IV in the emergency department 5/15 night and another dose of Lasix 20 mg IV x 1 on 5/16  -Continue holding diuresis, last diuresis 5/16, 2/2 renal impairment      Acute on chronic hypoxic  respiratory failure on home oxygen  -Baseline oxygen is 2 L O2 nasal cannula as needed.  -Patient initially presented with requirement of 6 L O2 nasal cannula.  Now currently down to 1 L O2 nasal cannula.  Continue weaning oxygen as tolerated.     Essential HTN   Hypotension   BP now stable. Cont to monitor closely to avoid hypotension in the setting of possible ATN     CT imaging with AAA and aortic dissection  -Vascular surgery consulted no vascular surgery intervention recommended AAA and aortic dissection appears chronic.     History of paroxysmal A-fib  -Currently rate controlled A-fib.  -Continue apixaban 2.5 mg twice daily  -Continue home metoprolol succinate 100 mg daily     HLD  - Continue home atorvastatin 40 mg nightly     Glaucoma  - Continue home drops     Disposition: Patient mentally has improved, renal function improving.     Time > 55 mins     SUBJECTIVE       Pt appeared more alert and awake today with good urine output. Less agitated overnight     OBJECTIVE      Visit Vitals  /60   Pulse 64   Temp 36.6 °C (97.9 °F)   Resp 16        Intake/Output Summary (Last 24 hours) at 5/23/2025 1048  Last data filed at 5/23/2025 0700  Gross per 24 hour   Intake 325 ml   Output 500 ml   Net -175 ml       Physical Exam   General: Lying in bed without distress.  Frail-appearing.  Cooperative.  Mildly hard of hearing.  Skin: No rashes or ulcerations.  HEENT: Sclera is white.  Mucous membranes moist.  Neck: Supple.  No JVD.  Cardiac: Regular rate and rhythm, S1/S2 normal.  Lungs: Clear to auscultation bilaterally, no wheezing or crackles, no accessory muscle use at rest.  Abdomen: Soft, nontender, nondistended, BS +  Extremities: No cyanosis.  No lower extremity edema.  Neurologic: Alert, responsive and answering questions today, oriented to self, partially to place, not to time.  No focal deficits.  Psychiatric: Appropriate mood and behavior.  Currently no agitation.    Current Meds   Scheduled  Medications[1]   PRN Medications[2]     LABS and IMAGING     WBC   Date Value Ref Range Status   05/23/2025 7.4 4.4 - 11.3 x10*3/uL Final   05/22/2025 5.7 4.4 - 11.3 x10*3/uL Final   05/21/2025 6.5 4.4 - 11.3 x10*3/uL Final     Hemoglobin   Date Value Ref Range Status   05/23/2025 10.4 (L) 12.0 - 16.0 g/dL Final   05/22/2025 10.8 (L) 12.0 - 16.0 g/dL Final   05/21/2025 10.5 (L) 12.0 - 16.0 g/dL Final     Hematocrit   Date Value Ref Range Status   05/23/2025 33.5 (L) 36.0 - 46.0 % Final   05/22/2025 35.6 (L) 36.0 - 46.0 % Final   05/21/2025 33.8 (L) 36.0 - 46.0 % Final     Bicarbonate   Date Value Ref Range Status   05/23/2025 23 21 - 32 mmol/L Final   05/22/2025 22 21 - 32 mmol/L Final   05/21/2025 26 21 - 32 mmol/L Final     Creatinine   Date Value Ref Range Status   05/23/2025 3.16 (H) 0.50 - 1.05 mg/dL Final   05/22/2025 4.61 (H) 0.50 - 1.05 mg/dL Final   05/21/2025 5.31 (H) 0.50 - 1.05 mg/dL Final     Calcium   Date Value Ref Range Status   05/23/2025 8.7 8.6 - 10.6 mg/dL Final   05/22/2025 9.1 8.6 - 10.6 mg/dL Final   05/21/2025 8.7 8.6 - 10.6 mg/dL Final       US renal complete  Narrative: Interpreted By:  Reed Hernandez and Hofer Lindsay   STUDY:  US RENAL COMPLETE;  5/19/2025 1:53 pm      INDICATION:  Signs/Symptoms:progressive CODY.          COMPARISON:  CT chest abdomen and pelvis 05/15/2025.      ACCESSION NUMBER(S):  [id number]      ORDERING CLINICIAN:  CORRIE YORK      TECHNIQUE:  Multiple images of the kidneys were obtained.      FINDINGS:  Limitations due to bowel gas and body habitus.      RIGHT KIDNEY:  The right kidney measures 10.92 cm in length. The renal cortex is  echogenic. No hydronephrosis is present. No evidence of  nephrolithiasis. Multiple anechoic structures with posterior acoustic  enhancement and no internal vascularity, incompletely characterized  on ultrasound, but most consistent with simple renal cysts measuring  up to 4.6 x 3.1 x 3.3 cm. The previously seen  enhancing lesion within  the right kidney is not definitively visualized on ultrasound, likely  due to limited exam.      LEFT KIDNEY:  The left kidney is poorly visualized due to bowel gas and body  habitus. The visualized left kidney measures 9.83 cm in length. The  renal cortex is echogenic. No hydronephrosis is present. No evidence  of nephrolithiasis. Hypoechoic structure within the left kidney with  posterior acoustic enhancement, which is incompletely evaluated on  ultrasound, but most consistent with a simple renal cyst.      BLADDER:  The urinary bladder is unremarkable in appearance.      Impression: 1. The previously described enhancing nodule in the right kidney from  prior CT chest abdomen and pelvis dated 05/15/2025 is not  definitively visualized on ultrasound examination, likely due to  limitations above. Consider MRI for further characterization in case  of persistent concern.  2. The bilateral kidneys are echogenic, which can be seen in the  setting of medical renal disease. Limited evaluation of the left  kidney as above. Multiple cysts throughout the bilateral kidneys. No  evidence of hydronephrosis.      I personally reviewed the images/study and resident's interpretation  and I agree with the findings as stated by Scarlett Gallego MD (resident  radiologist). This study was analyzed and interpreted at [place], [place].      MACRO:  None          Signed by: Reed Miramontes 5/19/2025 10:06 PM  Dictation workstation:   [id number]         PROBLEM LISTS      Problem List Items Addressed This Visit          Neuro    * (Principal) RESOLVED: Altered mental status, unspecified altered mental status type - Primary     Other Visit Diagnoses         Acute cystitis without hematuria        Relevant Medications    acetaminophen (Tylenol) 325 mg tablet    cephalexin (Keflex) 500 mg capsule            This dictation was created with voice recognition  software. Although every effort is made to review the dictation as it is transcribed, on occasion spoken words, phrases, names, numbers, punctuation etc can be misinterpreted by the the technology leading to omissions or inappropriate outcomes.     Sandra Ward MD                 [1] apixaban, 2.5 mg, oral, BID  atorvastatin, 40 mg, oral, Nightly  brimonidine, 1 drop, Left Eye, BID  cholecalciferol, 50 mcg, oral, Daily  dorzolamide-timoloL, 1 drop, Left Eye, BID  latanoprost, 1 drop, Left Eye, Nightly  [Held by provider] lisinopril, 10 mg, oral, Daily  metoprolol succinate XL, 100 mg, oral, Daily  mirtazapine, 7.5 mg, oral, Nightly  sennosides-docusate sodium, 2 tablet, oral, BID  sertraline, 25 mg, oral, Daily  sodium chloride, 1 spray, Each Nostril, BID     [2] PRN medications: acetaminophen **OR** acetaminophen **OR** acetaminophen, diclofenac sodium, ipratropium-albuteroL, labetaloL, OLANZapine zydis **OR** OLANZapine, ondansetron **OR** ondansetron

## 2025-05-23 NOTE — CARE PLAN
Problem: Pain - Adult  Goal: Verbalizes/displays adequate comfort level or baseline comfort level  Outcome: Progressing     Problem: Safety - Adult  Goal: Free from fall injury  Outcome: Progressing     Problem: Nutrition  Goal: Nutrient intake appropriate for maintaining nutritional needs  Outcome: Progressing     Problem: Skin  Goal: Promote/optimize nutrition  Outcome: Progressing  Flowsheets (Taken 5/23/2025 7271)  Promote/optimize nutrition: Assist with feeding     Problem: Fall/Injury  Goal: Not fall by end of shift  Outcome: Progressing  Goal: Be free from injury by end of the shift  Outcome: Progressing   The patient's goals for the shift include Patient will remain safe and free from injury and falls during shift    The clinical goals for the shift include Patient will consume <40% of meals during this shift

## 2025-05-23 NOTE — CARE PLAN
The patient's goals for the shift include Patient will remain safe and free from injury and falls during shift    The clinical goals for the shift include pt will remain safe during shift

## 2025-05-23 NOTE — CARE PLAN
The patient's goals for the shift include Patient will remain safe and free from injury and falls during shift    The clinical goals for the shift include Patient will consume <40% of meals during this shift      Problem: Pain - Adult  Goal: Verbalizes/displays adequate comfort level or baseline comfort level  Outcome: Progressing     Problem: Safety - Adult  Goal: Free from fall injury  Outcome: Progressing     Problem: Nutrition  Goal: Nutrient intake appropriate for maintaining nutritional needs  Outcome: Progressing     Problem: Fall/Injury  Goal: Not fall by end of shift  Outcome: Progressing  Goal: Be free from injury by end of the shift  Outcome: Progressing

## 2025-05-23 NOTE — PROGRESS NOTES
Physical Therapy Treatment    Patient Name: Susan Patiño  MRN: [id number]  Today's Date: 5/23/2025  Room: [address] [place] [address]  Time Calculation  Start Time: 0832  Stop Time: 0915  Time Calculation (min): 43 min       Assessment/Plan   PT Assessment  PT Assessment Results: Decreased endurance, Impaired balance, Decreased mobility, Decreased cognition, Impaired hearing, Decreased strength, Decreased safety awareness  Rehab Prognosis: Good  Barriers to Discharge Home: No anticipated barriers  Evaluation/Treatment Tolerance: Patient limited by fatigue, Patient tolerated treatment well  Strengths: Attitude of self  Barriers to Participation: Ability to acquire knowledge, Comorbidities, Insight into problems  End of Session Communication: Bedside nurse  End of Session Patient Position: Up in chair, Alarm on     PT Plan  Treatment/Interventions: Bed mobility, Transfer training, Gait training, Balance training, Therapeutic exercise, Therapeutic activity  PT Plan: Ongoing PT  PT Frequency: 3 times per week  PT Discharge Recommendations: Moderate intensity level of continued care  PT Recommended Transfer Status: Assist x2  PT - OK to Discharge: Yes (when medically appropriate)  Assessment: Patient is progressing Well with therapy this date. Still confused but able to follow directions with repetition. Would continue to benefit from continued skilled PT to address all mobility deficits; Patient remains appropriate for MOD intensity therapy when medically ready for discharge from acute stay.  Will continue to follow.     General Visit Information:   PT  Visit  PT Received On: 05/23/25  Prior to Session Communication: Bedside nurse  Patient Position Received: Bed, 3 rail up, Alarm on   Subjective   Subjective: pt sitting EOB upon approach  Precautions:  Precautions  Hearing/Visual Limitations: [facility]  Medical Precautions: Fall precautions  Vital Signs:    Objective   Pain:  Pain Assessment  Pain Assessment: 0-10  0-10 (Numeric) Pain  Score: 0 - No pain  Cognition:  Cognition  Overall Cognitive Status: Impaired  Arousal/Alertness: Appropriate responses to stimuli  Orientation Level: Disoriented to time, Disoriented to place, Disoriented to situation  Following Commands: Follows one step commands with repetition  Safety Judgment: Decreased awareness of need for assistance  Cognition Comments: Pleasantly confused, spent time re-orienting pt. Needs extended time for processing  Insight: Moderate  Impulsive: Mildly  Processing Speed: Delayed     Static Sitting balance:  Static Sitting Balance  Static Sitting-Balance Support: Feet supported, Bilateral upper extremity supported  Static Sitting-Level of Assistance: Close supervision    Lines/Tubes/Drains:  External Urinary Catheter (Active)   Number of days: 1     PT Treatments:     Therapeutic Activity  Therapeutic Activity Performed: Yes  Therapeutic Activity 1: Extended time for pericare d/t pt being heavily soiled upon approach, perseverates on using bathroom despite pt having already went in bed     Bed Mobility 1  Bed Mobility 1: Rolling right, Rolling left  Level of Assistance 1: Minimum assistance  Bed Mobility Comments 1: needs cues for use of bed rail and for full sidelying position  Bed Mobility 2  Bed Mobility  2: Supine to sitting  Level of Assistance 2: Minimum assistance  Bed Mobility Comments 2: for trunk, mod vc for sequencing  Bed Mobility 3  Bed Mobility 3: Sitting to supine  Level of Assistance 3: Moderate assistance  Bed Mobility Comments 3: for BLE management     Transfer 1  Transfer From 1: Sit to  Transfer to 1: Stand  Technique 1: Sit to stand, Stand to sit  Transfer Level of Assistance 1: Maximum assistance, Arm in arm assistance  Trials/Comments 1: x2 max cues for sequencing of LE as they slide anterior upon standing  Transfers 2  Transfer From 2: Stand to  Transfer to 2: Chair with arms  Technique 2: Stand pivot  Transfer Level of Assistance 2: Arm in arm assistance, Maximum  assistance  Trials/Comments 2: x1, very unsteady on feet but able to take a few steps to chair, max vc for safety           Activity tolerance:  Activity Tolerance  Endurance: Tolerates 10 - 20 min exercise with multiple rests  Outcome Measures:  [facility] Basic Mobility  Turning from your back to your side while in a flat bed without using bedrails: A little  Moving from lying on your back to sitting on the side of a flat bed without using bedrails: A lot  Moving to and from bed to chair (including a wheelchair): A lot  Standing up from a chair using your arms (e.g. wheelchair or bedside chair): A lot  To walk in hospital room: Total  Climbing 3-5 steps with railing: Total  Basic Mobility - Total Score: 11       Education Documentation  Body Mechanics, taught by Zahira Avilez PTA at 5/23/2025 10:32 AM.  Learner: Patient  Readiness: Acceptance  Method: Explanation  Response: No Evidence of Learning  Comment: safety with mobility    Mobility Training, taught by Zahira Avilez PTA at 5/23/2025 10:32 AM.  Learner: Patient  Readiness: Acceptance  Method: Explanation  Response: No Evidence of Learning  Comment: safety with mobility    Education Comments  No comments found.        OP EDUCATION:       Encounter Problems       Encounter Problems (Active)       PT Problem       Patient will perform supine to sit and sit to supine with min assist (Progressing)       Start:  05/21/25    Expected End:  06/04/25            Patient will perform sit to stand and stand pivot transfer with min assist with walker (Progressing)       Start:  05/21/25    Expected End:  06/04/25            Patient will ambulate 20' with wheeled walker and min assist (Progressing)       Start:  05/21/25    Expected End:  06/04/25               Pain - Adult

## 2025-05-24 LAB
ALBUMIN SERPL BCP-MCNC: 3.2 G/DL (ref 3.4–5)
ALP SERPL-CCNC: 121 U/L (ref 33–136)
ALT SERPL W P-5'-P-CCNC: 29 U/L (ref 7–45)
ANION GAP SERPL CALC-SCNC: 19 MMOL/L (ref 10–20)
AST SERPL W P-5'-P-CCNC: 29 U/L (ref 9–39)
BILIRUB SERPL-MCNC: 0.6 MG/DL (ref 0–1.2)
BUN SERPL-MCNC: 56 MG/DL (ref 6–23)
CALCIUM SERPL-MCNC: 8.9 MG/DL (ref 8.6–10.6)
CHLORIDE SERPL-SCNC: 104 MMOL/L (ref 98–107)
CO2 SERPL-SCNC: 21 MMOL/L (ref 21–32)
CREAT SERPL-MCNC: 3.13 MG/DL (ref 0.5–1.05)
EGFRCR SERPLBLD CKD-EPI 2021: 13 ML/MIN/1.73M*2
ERYTHROCYTE [DISTWIDTH] IN BLOOD BY AUTOMATED COUNT: 15.1 % (ref 11.5–14.5)
GLUCOSE BLD MANUAL STRIP-MCNC: 126 MG/DL (ref 74–99)
GLUCOSE BLD MANUAL STRIP-MCNC: 92 MG/DL (ref 74–99)
GLUCOSE SERPL-MCNC: 74 MG/DL (ref 74–99)
HCT VFR BLD AUTO: 33.8 % (ref 36–46)
HGB BLD-MCNC: 10.3 G/DL (ref 12–16)
MCH RBC QN AUTO: 28.7 PG (ref 26–34)
MCHC RBC AUTO-ENTMCNC: 30.5 G/DL (ref 32–36)
MCV RBC AUTO: 94 FL (ref 80–100)
NRBC BLD-RTO: 0 /100 WBCS (ref 0–0)
PLATELET # BLD AUTO: 107 X10*3/UL (ref 150–450)
POTASSIUM SERPL-SCNC: 3.1 MMOL/L (ref 3.5–5.3)
PROT SERPL-MCNC: 6 G/DL (ref 6.4–8.2)
RBC # BLD AUTO: 3.59 X10*6/UL (ref 4–5.2)
SODIUM SERPL-SCNC: 141 MMOL/L (ref 136–145)
WBC # BLD AUTO: 17.3 X10*3/UL (ref 4.4–11.3)

## 2025-05-24 PROCEDURE — 99233 SBSQ HOSP IP/OBS HIGH 50: CPT | Performed by: STUDENT IN AN ORGANIZED HEALTH CARE EDUCATION/TRAINING PROGRAM

## 2025-05-24 PROCEDURE — 80053 COMPREHEN METABOLIC PANEL: CPT | Performed by: STUDENT IN AN ORGANIZED HEALTH CARE EDUCATION/TRAINING PROGRAM

## 2025-05-24 PROCEDURE — 2500000002 HC RX 250 W HCPCS SELF ADMINISTERED DRUGS (ALT 637 FOR MEDICARE OP, ALT 636 FOR OP/ED): Performed by: STUDENT IN AN ORGANIZED HEALTH CARE EDUCATION/TRAINING PROGRAM

## 2025-05-24 PROCEDURE — 36415 COLL VENOUS BLD VENIPUNCTURE: CPT | Performed by: STUDENT IN AN ORGANIZED HEALTH CARE EDUCATION/TRAINING PROGRAM

## 2025-05-24 PROCEDURE — 85027 COMPLETE CBC AUTOMATED: CPT | Performed by: STUDENT IN AN ORGANIZED HEALTH CARE EDUCATION/TRAINING PROGRAM

## 2025-05-24 PROCEDURE — 82947 ASSAY GLUCOSE BLOOD QUANT: CPT

## 2025-05-24 PROCEDURE — 84155 ASSAY OF PROTEIN SERUM: CPT | Performed by: STUDENT IN AN ORGANIZED HEALTH CARE EDUCATION/TRAINING PROGRAM

## 2025-05-24 PROCEDURE — 2500000001 HC RX 250 WO HCPCS SELF ADMINISTERED DRUGS (ALT 637 FOR MEDICARE OP): Performed by: STUDENT IN AN ORGANIZED HEALTH CARE EDUCATION/TRAINING PROGRAM

## 2025-05-24 PROCEDURE — 2500000004 HC RX 250 GENERAL PHARMACY W/ HCPCS (ALT 636 FOR OP/ED): Mod: JZ | Performed by: STUDENT IN AN ORGANIZED HEALTH CARE EDUCATION/TRAINING PROGRAM

## 2025-05-24 PROCEDURE — 1210000001 HC SEMI-PRIVATE ROOM DAILY

## 2025-05-24 RX ORDER — TAMSULOSIN HYDROCHLORIDE 0.4 MG/1
0.4 CAPSULE ORAL DAILY
Status: DISCONTINUED | OUTPATIENT
Start: 2025-05-24 | End: 2025-05-25

## 2025-05-24 RX ORDER — FUROSEMIDE 20 MG/1
20 TABLET ORAL DAILY
Qty: 30 TABLET | Refills: 0 | Status: CANCELLED | OUTPATIENT
Start: 2025-05-30 | End: 2025-06-29

## 2025-05-24 RX ADMIN — APIXABAN 2.5 MG: 2.5 TABLET, FILM COATED ORAL at 09:40

## 2025-05-24 RX ADMIN — ATORVASTATIN CALCIUM 40 MG: 40 TABLET, FILM COATED ORAL at 20:20

## 2025-05-24 RX ADMIN — DORZOLAMIDE HYDROCHLORIDE AND TIMOLOL MALEATE 1 DROP: 20; 5 SOLUTION OPHTHALMIC at 20:18

## 2025-05-24 RX ADMIN — BRIMONIDINE TARTRATE 1 DROP: 2 SOLUTION/ DROPS OPHTHALMIC at 20:18

## 2025-05-24 RX ADMIN — DORZOLAMIDE HYDROCHLORIDE AND TIMOLOL MALEATE 1 DROP: 20; 5 SOLUTION OPHTHALMIC at 09:40

## 2025-05-24 RX ADMIN — BRIMONIDINE TARTRATE 1 DROP: 2 SOLUTION/ DROPS OPHTHALMIC at 09:40

## 2025-05-24 RX ADMIN — TAMSULOSIN HYDROCHLORIDE 0.4 MG: 0.4 CAPSULE ORAL at 18:38

## 2025-05-24 RX ADMIN — SODIUM CHLORIDE 500 ML: 0.9 INJECTION, SOLUTION INTRAVENOUS at 18:32

## 2025-05-24 RX ADMIN — SALINE NASAL SPRAY 1 SPRAY: 1.5 SOLUTION NASAL at 09:45

## 2025-05-24 RX ADMIN — SERTRALINE HYDROCHLORIDE 25 MG: 50 TABLET ORAL at 09:40

## 2025-05-24 RX ADMIN — METOPROLOL SUCCINATE 100 MG: 100 TABLET, EXTENDED RELEASE ORAL at 09:40

## 2025-05-24 RX ADMIN — LATANOPROST 1 DROP: 50 SOLUTION OPHTHALMIC at 20:17

## 2025-05-24 RX ADMIN — SENNOSIDES AND DOCUSATE SODIUM 2 TABLET: 50; 8.6 TABLET ORAL at 20:19

## 2025-05-24 RX ADMIN — MIRTAZAPINE 7.5 MG: 15 TABLET, FILM COATED ORAL at 20:20

## 2025-05-24 RX ADMIN — APIXABAN 2.5 MG: 2.5 TABLET, FILM COATED ORAL at 20:19

## 2025-05-24 RX ADMIN — Medication 50 MCG: at 09:40

## 2025-05-24 RX ADMIN — SALINE NASAL SPRAY 1 SPRAY: 1.5 SOLUTION NASAL at 20:18

## 2025-05-24 ASSESSMENT — COGNITIVE AND FUNCTIONAL STATUS - GENERAL
DAILY ACTIVITIY SCORE: 12
DRESSING REGULAR LOWER BODY CLOTHING: A LOT
WALKING IN HOSPITAL ROOM: A LOT
DRESSING REGULAR UPPER BODY CLOTHING: A LOT
TOILETING: A LOT
MOBILITY SCORE: 12
TURNING FROM BACK TO SIDE WHILE IN FLAT BAD: A LOT
STANDING UP FROM CHAIR USING ARMS: A LOT
DAILY ACTIVITIY SCORE: 13
CLIMB 3 TO 5 STEPS WITH RAILING: TOTAL
MOBILITY SCORE: 10
HELP NEEDED FOR BATHING: A LOT
PERSONAL GROOMING: A LOT
MOVING FROM LYING ON BACK TO SITTING ON SIDE OF FLAT BED WITH BEDRAILS: A LOT
DRESSING REGULAR UPPER BODY CLOTHING: A LOT
MOVING FROM LYING ON BACK TO SITTING ON SIDE OF FLAT BED WITH BEDRAILS: A LOT
WALKING IN HOSPITAL ROOM: TOTAL
MOVING TO AND FROM BED TO CHAIR: A LOT
MOVING TO AND FROM BED TO CHAIR: A LOT
EATING MEALS: A LITTLE
CLIMB 3 TO 5 STEPS WITH RAILING: A LOT
STANDING UP FROM CHAIR USING ARMS: A LOT
PERSONAL GROOMING: A LOT
HELP NEEDED FOR BATHING: A LOT
DRESSING REGULAR LOWER BODY CLOTHING: A LOT
EATING MEALS: A LOT
TURNING FROM BACK TO SIDE WHILE IN FLAT BAD: A LOT
TOILETING: A LOT

## 2025-05-24 ASSESSMENT — PAIN - FUNCTIONAL ASSESSMENT
PAIN_FUNCTIONAL_ASSESSMENT: 0-10

## 2025-05-24 ASSESSMENT — PAIN SCALES - GENERAL
PAINLEVEL_OUTOF10: 0 - NO PAIN

## 2025-05-24 NOTE — CARE PLAN
The patient's goals for the shift include Pat ient will remain safe and free from injury and falls during shift    The clinical goals for the shift include Patient will be HDS during shift    Other goals include:  Problem: Safety - Medical Restraint  Goal: Remains free of injury from restraints (Restraint for Interference with Medical Device)  Outcome: Progressing  Goal: Free from restraint(s) (Restraint for Interference with Medical Device)  Outcome: Progressing     Problem: Pain - Adult  Goal: Verbalizes/displays adequate comfort level or baseline comfort level  Outcome: Progressing     Problem: Safety - Adult  Goal: Free from fall injury  Outcome: Progressing     Problem: Discharge Planning  Goal: Discharge to home or other facility with appropriate resources  Outcome: Progressing     Problem: Skin  Goal: Decreased wound size/increased tissue granulation at next dressing change  Outcome: Progressing  Flowsheets (Taken 5/24/2025 1600)  Decreased wound size/increased tissue granulation at next dressing change: Protective dressings over bony prominences  Goal: Participates in plan/prevention/treatment measures  Outcome: Progressing  Flowsheets (Taken 5/24/2025 1600)  Participates in plan/prevention/treatment measures:   Elevate heels   Discuss with provider PT/OT consult  Goal: Prevent/manage excess moisture  Outcome: Progressing  Flowsheets (Taken 5/24/2025 1600)  Prevent/manage excess moisture:   Monitor for/manage infection if present   Cleanse incontinence/protect with barrier cream  Goal: Prevent/minimize sheer/friction injuries  Outcome: Progressing  Flowsheets (Taken 5/24/2025 1600)  Prevent/minimize sheer/friction injuries:   HOB 30 degrees or less   Turn/reposition every 2 hours/use positioning/transfer devices  Goal: Promote/optimize nutrition  Outcome: Progressing  Flowsheets (Taken 5/24/2025 1600)  Promote/optimize nutrition: Consume > 50% meals/supplements  Goal: Promote skin healing  Outcome:  Progressing  Flowsheets (Taken 5/24/2025 1600)  Promote skin healing:   Ensure correct size (line/device) and apply per  instructions   Protective dressings over bony prominences   Assess skin/pad under line(s)/device(s)     Problem: Fall/Injury  Goal: Not fall by end of shift  Outcome: Progressing  Goal: Be free from injury by end of the shift  Outcome: Progressing  Goal: Verbalize understanding of personal risk factors for fall in the hospital  Outcome: Progressing  Goal: Verbalize understanding of risk factor reduction measures to prevent injury from fall in the home  Outcome: Progressing  Goal: Use assistive devices by end of the shift  Outcome: Progressing  Goal: Pace activities to prevent fatigue by end of the shift  Outcome: Progressing

## 2025-05-24 NOTE — CARE PLAN
The patient's goals for the shift include Patient will remain safe and free from injury and falls during shift    The clinical goals for the shift include pt will hds throughout the entire shift

## 2025-05-25 VITALS
RESPIRATION RATE: 16 BRPM | TEMPERATURE: 96.6 F | BODY MASS INDEX: 20.81 KG/M2 | OXYGEN SATURATION: 97 % | SYSTOLIC BLOOD PRESSURE: 116 MMHG | WEIGHT: 110.23 LBS | HEIGHT: 61 IN | HEART RATE: 75 BPM | DIASTOLIC BLOOD PRESSURE: 73 MMHG

## 2025-05-25 LAB
ALBUMIN SERPL BCP-MCNC: 2.9 G/DL (ref 3.4–5)
ALP SERPL-CCNC: 108 U/L (ref 33–136)
ALT SERPL W P-5'-P-CCNC: 25 U/L (ref 7–45)
ANION GAP SERPL CALC-SCNC: 15 MMOL/L (ref 10–20)
AST SERPL W P-5'-P-CCNC: 28 U/L (ref 9–39)
BILIRUB SERPL-MCNC: 0.5 MG/DL (ref 0–1.2)
BUN SERPL-MCNC: 64 MG/DL (ref 6–23)
CALCIUM SERPL-MCNC: 8.6 MG/DL (ref 8.6–10.6)
CHLORIDE SERPL-SCNC: 105 MMOL/L (ref 98–107)
CO2 SERPL-SCNC: 22 MMOL/L (ref 21–32)
CREAT SERPL-MCNC: 3.26 MG/DL (ref 0.5–1.05)
EGFRCR SERPLBLD CKD-EPI 2021: 12 ML/MIN/1.73M*2
ERYTHROCYTE [DISTWIDTH] IN BLOOD BY AUTOMATED COUNT: 15.6 % (ref 11.5–14.5)
GLUCOSE BLD MANUAL STRIP-MCNC: 126 MG/DL (ref 74–99)
GLUCOSE BLD MANUAL STRIP-MCNC: 197 MG/DL (ref 74–99)
GLUCOSE SERPL-MCNC: 95 MG/DL (ref 74–99)
HCT VFR BLD AUTO: 33.5 % (ref 36–46)
HGB BLD-MCNC: 9.8 G/DL (ref 12–16)
MCH RBC QN AUTO: 28.9 PG (ref 26–34)
MCHC RBC AUTO-ENTMCNC: 29.3 G/DL (ref 32–36)
MCV RBC AUTO: 99 FL (ref 80–100)
NRBC BLD-RTO: 0 /100 WBCS (ref 0–0)
PLATELET # BLD AUTO: 109 X10*3/UL (ref 150–450)
POTASSIUM SERPL-SCNC: 3.2 MMOL/L (ref 3.5–5.3)
PROT SERPL-MCNC: 5.6 G/DL (ref 6.4–8.2)
RBC # BLD AUTO: 3.39 X10*6/UL (ref 4–5.2)
SODIUM SERPL-SCNC: 139 MMOL/L (ref 136–145)
WBC # BLD AUTO: 12.2 X10*3/UL (ref 4.4–11.3)

## 2025-05-25 PROCEDURE — 36415 COLL VENOUS BLD VENIPUNCTURE: CPT | Performed by: STUDENT IN AN ORGANIZED HEALTH CARE EDUCATION/TRAINING PROGRAM

## 2025-05-25 PROCEDURE — 82947 ASSAY GLUCOSE BLOOD QUANT: CPT

## 2025-05-25 PROCEDURE — 51701 INSERT BLADDER CATHETER: CPT

## 2025-05-25 PROCEDURE — 99233 SBSQ HOSP IP/OBS HIGH 50: CPT | Performed by: STUDENT IN AN ORGANIZED HEALTH CARE EDUCATION/TRAINING PROGRAM

## 2025-05-25 PROCEDURE — 1210000001 HC SEMI-PRIVATE ROOM DAILY

## 2025-05-25 PROCEDURE — 2500000004 HC RX 250 GENERAL PHARMACY W/ HCPCS (ALT 636 FOR OP/ED): Mod: JZ | Performed by: STUDENT IN AN ORGANIZED HEALTH CARE EDUCATION/TRAINING PROGRAM

## 2025-05-25 PROCEDURE — 99232 SBSQ HOSP IP/OBS MODERATE 35: CPT | Performed by: INTERNAL MEDICINE

## 2025-05-25 PROCEDURE — 85027 COMPLETE CBC AUTOMATED: CPT | Performed by: STUDENT IN AN ORGANIZED HEALTH CARE EDUCATION/TRAINING PROGRAM

## 2025-05-25 PROCEDURE — 2500000001 HC RX 250 WO HCPCS SELF ADMINISTERED DRUGS (ALT 637 FOR MEDICARE OP): Performed by: STUDENT IN AN ORGANIZED HEALTH CARE EDUCATION/TRAINING PROGRAM

## 2025-05-25 PROCEDURE — 2500000002 HC RX 250 W HCPCS SELF ADMINISTERED DRUGS (ALT 637 FOR MEDICARE OP, ALT 636 FOR OP/ED): Performed by: STUDENT IN AN ORGANIZED HEALTH CARE EDUCATION/TRAINING PROGRAM

## 2025-05-25 PROCEDURE — 80053 COMPREHEN METABOLIC PANEL: CPT | Performed by: STUDENT IN AN ORGANIZED HEALTH CARE EDUCATION/TRAINING PROGRAM

## 2025-05-25 RX ORDER — SODIUM CHLORIDE, SODIUM LACTATE, POTASSIUM CHLORIDE, CALCIUM CHLORIDE 600; 310; 30; 20 MG/100ML; MG/100ML; MG/100ML; MG/100ML
75 INJECTION, SOLUTION INTRAVENOUS CONTINUOUS
Status: ACTIVE | OUTPATIENT
Start: 2025-05-25 | End: 2025-05-26

## 2025-05-25 RX ADMIN — LATANOPROST 1 DROP: 50 SOLUTION OPHTHALMIC at 21:51

## 2025-05-25 RX ADMIN — Medication 50 MCG: at 09:07

## 2025-05-25 RX ADMIN — DORZOLAMIDE HYDROCHLORIDE AND TIMOLOL MALEATE 1 DROP: 20; 5 SOLUTION OPHTHALMIC at 21:51

## 2025-05-25 RX ADMIN — BRIMONIDINE TARTRATE 1 DROP: 2 SOLUTION/ DROPS OPHTHALMIC at 09:06

## 2025-05-25 RX ADMIN — TAMSULOSIN HYDROCHLORIDE 0.4 MG: 0.4 CAPSULE ORAL at 10:23

## 2025-05-25 RX ADMIN — BRIMONIDINE TARTRATE 1 DROP: 2 SOLUTION/ DROPS OPHTHALMIC at 21:51

## 2025-05-25 RX ADMIN — ATORVASTATIN CALCIUM 40 MG: 40 TABLET, FILM COATED ORAL at 21:51

## 2025-05-25 RX ADMIN — SODIUM CHLORIDE, POTASSIUM CHLORIDE, SODIUM LACTATE AND CALCIUM CHLORIDE 75 ML/HR: 600; 310; 30; 20 INJECTION, SOLUTION INTRAVENOUS at 12:35

## 2025-05-25 RX ADMIN — METOPROLOL SUCCINATE 100 MG: 100 TABLET, EXTENDED RELEASE ORAL at 09:07

## 2025-05-25 RX ADMIN — APIXABAN 2.5 MG: 2.5 TABLET, FILM COATED ORAL at 21:51

## 2025-05-25 RX ADMIN — APIXABAN 2.5 MG: 2.5 TABLET, FILM COATED ORAL at 09:07

## 2025-05-25 RX ADMIN — SALINE NASAL SPRAY 1 SPRAY: 1.5 SOLUTION NASAL at 21:51

## 2025-05-25 RX ADMIN — DORZOLAMIDE HYDROCHLORIDE AND TIMOLOL MALEATE 1 DROP: 20; 5 SOLUTION OPHTHALMIC at 09:06

## 2025-05-25 RX ADMIN — SERTRALINE HYDROCHLORIDE 25 MG: 50 TABLET ORAL at 09:07

## 2025-05-25 ASSESSMENT — COGNITIVE AND FUNCTIONAL STATUS - GENERAL
MOVING TO AND FROM BED TO CHAIR: A LOT
PERSONAL GROOMING: A LOT
TOILETING: A LOT
WALKING IN HOSPITAL ROOM: TOTAL
TURNING FROM BACK TO SIDE WHILE IN FLAT BAD: A LOT
MOBILITY SCORE: 10
MOVING FROM LYING ON BACK TO SITTING ON SIDE OF FLAT BED WITH BEDRAILS: A LOT
HELP NEEDED FOR BATHING: A LOT
CLIMB 3 TO 5 STEPS WITH RAILING: TOTAL
DRESSING REGULAR UPPER BODY CLOTHING: A LOT
DRESSING REGULAR LOWER BODY CLOTHING: A LOT
STANDING UP FROM CHAIR USING ARMS: A LOT
DAILY ACTIVITIY SCORE: 13
EATING MEALS: A LITTLE

## 2025-05-25 ASSESSMENT — PAIN - FUNCTIONAL ASSESSMENT
PAIN_FUNCTIONAL_ASSESSMENT: 0-10

## 2025-05-25 ASSESSMENT — PAIN SCALES - GENERAL
PAINLEVEL_OUTOF10: 0 - NO PAIN

## 2025-05-25 NOTE — CARE PLAN
The patient's goals for the shift include Patient will have less confusion during the shift    The clinical goals for the shift include patient will remain safe during the shift    Over the shift, the patient did not make progress toward the following goals. Barriers to progression include patient still confused. Recommendations to address these barriers include continue to orient to floor  Problem: Safety - Medical Restraint  Goal: Remains free of injury from restraints (Restraint for Interference with Medical Device)  Outcome: Progressing     Problem: Pain - Adult  Goal: Verbalizes/displays adequate comfort level or baseline comfort level  Outcome: Progressing     Problem: Safety - Adult  Goal: Free from fall injury  Outcome: Progressing

## 2025-05-25 NOTE — PROGRESS NOTES
.                                                                                                                                                                                                                                                                                             INTERNAL MEDICINE PROGRESS NOTE     BRIEF NARRATIVE      Susan Patiño is a 89 y.o. female on day 9 of admission presenting with Altered mental status, unspecified altered mental status type.    ASSESSMENT / PLANS      Acute kidney injury  Acute urinary retention   -Creatinine 0.99 on 5/15/25 . Cr 5.29 --> 5.31 ---> 3.26 today   -Etiology multifactorial, likely ATN. ddx include diuresis, BLAINE vs AIN,  less likely cardiorenal   -Renal ultrasound noted bilateral kidney echogenicity, multiple cysts, no evidence of hydronephrosis    -Nephrology following, no indication for RRT. Discussed with nephro   -Cr with no significant improvement, UOP minimal   -Encourage oral intake.   -Daily RFPs, avoid nephrotoxic agents   -Patient had not voided all day, bladder showed 180cc, she was straight cath. Pt appeared dry today, discussed with nephrology and they agreed on short course of IVF for a day or so since patient now has significant decreased oral intake   -Initiated hospice conversation with family on 5/25 ( daughter at bedside)    Acute metabolic encephalopathy 2/2 UTI   Delirium   Generalized weakness   -No significant cognitive issues at baseline  -CTH non acute   -Encephalopathy presumed secondary to UTI   -Now improving but not fully at baseline   -PT/OT daily, recommended moderate intensity. Discussed with family today   -Discussed with [facility] regarding discharge dispo      Acute cystitis   Klebsiella UTI with pyuria   -Continue IV Rocephin.  -Urine culture from 5/15 growing Klebsiella pneumonia. Will treat with ceftriaxone for 7 days (EOT 5/22)   -CTM         Acute on chronic diastolic CHF exacerbation  -BNP elevated to the 200s,  chest imaging showing pulmonary edema, hypoxia and bilateral lower extremity edema  -Status post Lasix 40 mg IV in the emergency department 5/15 night and another dose of Lasix 20 mg IV x 1 on 5/16  -Continue holding diuresis, last diuresis 5/16, 2/2 renal impairment      Acute on chronic hypoxic respiratory failure on home oxygen  -Baseline oxygen is 2 L O2 nasal cannula as needed.  -Patient initially presented with requirement of 6 L O2 nasal cannula.  Now currently down to 1 L O2 nasal cannula.  Continue weaning oxygen as tolerated.     Essential HTN   Hypotension   BP now stable. Cont to monitor closely to avoid hypotension in the setting of possible ATN     CT imaging with AAA and aortic dissection  -Vascular surgery consulted no vascular surgery intervention recommended AAA and aortic dissection appears chronic.     History of paroxysmal A-fib  -Currently rate controlled A-fib.  -Continue apixaban 2.5 mg twice daily  -Continue home metoprolol succinate 100 mg daily     HLD  - Continue home atorvastatin 40 mg nightly     Glaucoma  - Continue home drops     Disposition: Patient mentally has improved, renal function improving.     Time > 55 mins     SUBJECTIVE       Pt appeared more alert and awake today with good urine output. Less agitated overnight     OBJECTIVE      Visit Vitals  /51 (BP Location: Left arm, Patient Position: Lying)   Pulse 61   Temp 36.6 °C (97.9 °F) (Temporal)   Resp 16        Intake/Output Summary (Last 24 hours) at 5/25/2025 1415  Last data filed at 5/25/2025 1325  Gross per 24 hour   Intake 660.17 ml   Output 165 ml   Net 495.17 ml       Physical Exam   General: Lying in bed without distress.  Frail-appearing.  Cooperative.  Mildly hard of hearing.  Skin: No rashes or ulcerations.  HEENT: Sclera is white.  Mucous membranes moist.  Neck: Supple.  No JVD.  Cardiac: Regular rate and rhythm, S1/S2 normal.  Lungs: Clear to auscultation bilaterally, no wheezing or crackles, no accessory  muscle use at rest.  Abdomen: Soft, nontender, nondistended, BS +  Extremities: No cyanosis.  No lower extremity edema.  Neurologic: Alert, responsive and answering questions today, oriented to self, partially to place, not to time.  No focal deficits.  Psychiatric: Appropriate mood and behavior.  Currently no agitation.    Current Meds   Scheduled Medications[1]   PRN Medications[2]     LABS and IMAGING     WBC   Date Value Ref Range Status   05/25/2025 12.2 (H) 4.4 - 11.3 x10*3/uL Final   05/24/2025 17.3 (H) 4.4 - 11.3 x10*3/uL Final   05/23/2025 7.4 4.4 - 11.3 x10*3/uL Final     Hemoglobin   Date Value Ref Range Status   05/25/2025 9.8 (L) 12.0 - 16.0 g/dL Final   05/24/2025 10.3 (L) 12.0 - 16.0 g/dL Final   05/23/2025 10.4 (L) 12.0 - 16.0 g/dL Final     Hematocrit   Date Value Ref Range Status   05/25/2025 33.5 (L) 36.0 - 46.0 % Final   05/24/2025 33.8 (L) 36.0 - 46.0 % Final   05/23/2025 33.5 (L) 36.0 - 46.0 % Final     Bicarbonate   Date Value Ref Range Status   05/25/2025 22 21 - 32 mmol/L Final   05/24/2025 21 21 - 32 mmol/L Final   05/23/2025 23 21 - 32 mmol/L Final     Creatinine   Date Value Ref Range Status   05/25/2025 3.26 (H) 0.50 - 1.05 mg/dL Final   05/24/2025 3.13 (H) 0.50 - 1.05 mg/dL Final   05/23/2025 3.16 (H) 0.50 - 1.05 mg/dL Final     Calcium   Date Value Ref Range Status   05/25/2025 8.6 8.6 - 10.6 mg/dL Final   05/24/2025 8.9 8.6 - 10.6 mg/dL Final   05/23/2025 8.7 8.6 - 10.6 mg/dL Final       US renal complete  Narrative: Interpreted By:  Reed Hernandez and Hofer Lindsay   STUDY:  US RENAL COMPLETE;  5/19/2025 1:53 pm      INDICATION:  Signs/Symptoms:progressive CODY.          COMPARISON:  CT chest abdomen and pelvis 05/15/2025.      ACCESSION NUMBER(S):  [id number]      ORDERING CLINICIAN:  CORRIE YORK      TECHNIQUE:  Multiple images of the kidneys were obtained.      FINDINGS:  Limitations due to bowel gas and body habitus.      RIGHT KIDNEY:  The right kidney  measures 10.92 cm in length. The renal cortex is  echogenic. No hydronephrosis is present. No evidence of  nephrolithiasis. Multiple anechoic structures with posterior acoustic  enhancement and no internal vascularity, incompletely characterized  on ultrasound, but most consistent with simple renal cysts measuring  up to 4.6 x 3.1 x 3.3 cm. The previously seen enhancing lesion within  the right kidney is not definitively visualized on ultrasound, likely  due to limited exam.      LEFT KIDNEY:  The left kidney is poorly visualized due to bowel gas and body  habitus. The visualized left kidney measures 9.83 cm in length. The  renal cortex is echogenic. No hydronephrosis is present. No evidence  of nephrolithiasis. Hypoechoic structure within the left kidney with  posterior acoustic enhancement, which is incompletely evaluated on  ultrasound, but most consistent with a simple renal cyst.      BLADDER:  The urinary bladder is unremarkable in appearance.      Impression: 1. The previously described enhancing nodule in the right kidney from  prior CT chest abdomen and pelvis dated 05/15/2025 is not  definitively visualized on ultrasound examination, likely due to  limitations above. Consider MRI for further characterization in case  of persistent concern.  2. The bilateral kidneys are echogenic, which can be seen in the  setting of medical renal disease. Limited evaluation of the left  kidney as above. Multiple cysts throughout the bilateral kidneys. No  evidence of hydronephrosis.      I personally reviewed the images/study and resident's interpretation  and I agree with the findings as stated by Scarlett Gallego MD (resident  radiologist). This study was analyzed and interpreted at [facility], [place], [place].      MACRO:  None          Signed by: Reed Miramontes 5/19/2025 10:06 PM  Dictation workstation:   [id number]         PROBLEM LISTS      Problem List Items Addressed  This Visit          Neuro    * (Principal) RESOLVED: Altered mental status, unspecified altered mental status type - Primary     Other Visit Diagnoses         Acute cystitis without hematuria        Relevant Medications    acetaminophen (Tylenol) 325 mg tablet            This dictation was created with voice recognition software. Although every effort is made to review the dictation as it is transcribed, on occasion spoken words, phrases, names, numbers, punctuation etc can be misinterpreted by the the technology leading to omissions or inappropriate outcomes.     Sandra Ward MD                 [1] apixaban, 2.5 mg, oral, BID  atorvastatin, 40 mg, oral, Nightly  brimonidine, 1 drop, Left Eye, BID  cholecalciferol, 50 mcg, oral, Daily  dorzolamide-timoloL, 1 drop, Left Eye, BID  latanoprost, 1 drop, Left Eye, Nightly  [Held by provider] lisinopril, 10 mg, oral, Daily  metoprolol succinate XL, 100 mg, oral, Daily  mirtazapine, 7.5 mg, oral, Nightly  sennosides-docusate sodium, 2 tablet, oral, BID  sertraline, 25 mg, oral, Daily  sodium chloride, 1 spray, Each Nostril, BID     [2] PRN medications: acetaminophen **OR** acetaminophen **OR** acetaminophen, diclofenac sodium, ipratropium-albuteroL, labetaloL, OLANZapine zydis **OR** OLANZapine, ondansetron **OR** ondansetron

## 2025-05-25 NOTE — CARE PLAN
The patient's goals for the shift include Pat ient will remain safe and free from injury and falls during shift    The clinical goals for the shift include Patinet will remain HDS during shift    Other goal include:  Problem: Safety - Medical Restraint  Goal: Remains free of injury from restraints (Restraint for Interference with Medical Device)  5/25/2025 1326 by Hailee Deluna RN  Outcome: Progressing  5/25/2025 1326 by Hailee Deluna RN  Outcome: Progressing  Goal: Free from restraint(s) (Restraint for Interference with Medical Device)  5/25/2025 1326 by Hailee Deluna RN  Outcome: Progressing  5/25/2025 1326 by Hailee Deluna RN  Outcome: Progressing     Problem: Pain - Adult  Goal: Verbalizes/displays adequate comfort level or baseline comfort level  5/25/2025 1326 by Hailee Deluna RN  Outcome: Progressing  5/25/2025 1326 by Hailee Deluna RN  Outcome: Progressing     Problem: Safety - Adult  Goal: Free from fall injury  5/25/2025 1326 by Hailee Deluna RN  Outcome: Progressing  5/25/2025 1326 by Hailee Deluna RN  Outcome: Progressing     Problem: Chronic Conditions and Co-morbidities  Goal: Patient's chronic conditions and co-morbidity symptoms are monitored and maintained or improved  5/25/2025 1326 by Hailee Deluna RN  Outcome: Progressing  5/25/2025 1326 by Hailee Deluna RN  Outcome: Progressing     Problem: Nutrition  Goal: Nutrient intake appropriate for maintaining nutritional needs  5/25/2025 1326 by Hailee Deluna RN  Outcome: Progressing  5/25/2025 1326 by Hailee Deluna RN  Outcome: Progressing     Problem: Skin  Goal: Decreased wound size/increased tissue granulation at next dressing change  5/25/2025 1326 by Hailee Deluna RN  Outcome: Progressing  Flowsheets (Taken 5/25/2025 1326)  Decreased wound size/increased tissue granulation at next dressing change: Protective dressings over bony prominences  5/25/2025 1326 by Hailee Deluna RN  Outcome: Progressing  Flowsheets (Taken 5/25/2025  1326)  Decreased wound size/increased tissue granulation at next dressing change: Protective dressings over bony prominences  Goal: Participates in plan/prevention/treatment measures  5/25/2025 1326 by Hailee Deluna RN  Outcome: Progressing  Flowsheets (Taken 5/25/2025 1326)  Participates in plan/prevention/treatment measures: Discuss with provider PT/OT consult  5/25/2025 1326 by Hailee Deluna RN  Outcome: Progressing  Flowsheets (Taken 5/25/2025 1326)  Participates in plan/prevention/treatment measures: Discuss with provider PT/OT consult  Goal: Prevent/manage excess moisture  5/25/2025 1326 by Hailee Deluna RN  Outcome: Progressing  Flowsheets (Taken 5/25/2025 1326)  Prevent/manage excess moisture:   Cleanse incontinence/protect with barrier cream   Monitor for/manage infection if present  5/25/2025 1326 by Hailee Deluna RN  Outcome: Progressing  Flowsheets (Taken 5/25/2025 1326)  Prevent/manage excess moisture:   Cleanse incontinence/protect with barrier cream   Monitor for/manage infection if present  Goal: Prevent/minimize sheer/friction injuries  5/25/2025 1326 by Hailee Deluna RN  Outcome: Progressing  Flowsheets (Taken 5/25/2025 1326)  Prevent/minimize sheer/friction injuries:   Increase activity/out of bed for meals   Use pull sheet  5/25/2025 1326 by Hailee Deluna RN  Outcome: Progressing  Flowsheets (Taken 5/25/2025 1326)  Prevent/minimize sheer/friction injuries:   Increase activity/out of bed for meals   Use pull sheet  Goal: Promote/optimize nutrition  5/25/2025 1326 by Hailee Deluna RN  Outcome: Progressing  Flowsheets (Taken 5/25/2025 1326)  Promote/optimize nutrition:   Assist with feeding   Consume > 50% meals/supplements  5/25/2025 1326 by Hailee Deluna RN  Outcome: Progressing  Flowsheets (Taken 5/25/2025 1326)  Promote/optimize nutrition:   Assist with feeding   Consume > 50% meals/supplements  Goal: Promote skin healing  5/25/2025 1326 by Hailee Deluna RN  Outcome:  Progressing  Flowsheets (Taken 5/25/2025 1326)  Promote skin healing:   Protective dressings over bony prominences   Rotate device position/do not position patient on device  5/25/2025 1326 by Hailee Deluna RN  Outcome: Progressing  Flowsheets (Taken 5/25/2025 1326)  Promote skin healing:   Protective dressings over bony prominences   Rotate device position/do not position patient on device     Problem: Fall/Injury  Goal: Not fall by end of shift  5/25/2025 1326 by Hailee Deluna RN  Outcome: Progressing  5/25/2025 1326 by Hailee Deluna RN  Outcome: Progressing  Goal: Be free from injury by end of the shift  5/25/2025 1326 by Hailee Deluna RN  Outcome: Progressing  5/25/2025 1326 by Hailee Deluna RN  Outcome: Progressing  Goal: Verbalize understanding of personal risk factors for fall in the hospital  5/25/2025 1326 by Hailee Deluna RN  Outcome: Progressing  5/25/2025 1326 by Hailee Deluna RN  Outcome: Progressing  Goal: Verbalize understanding of risk factor reduction measures to prevent injury from fall in the home  5/25/2025 1326 by Hailee Deluna RN  Outcome: Progressing  5/25/2025 1326 by Hialee Deluna RN  Outcome: Progressing  Goal: Use assistive devices by end of the shift  5/25/2025 1326 by Hailee Deluna RN  Outcome: Progressing  5/25/2025 1326 by Hailee Deluna RN  Outcome: Progressing  Goal: Pace activities to prevent fatigue by end of the shift  5/25/2025 1326 by Hailee Deluna RN  Outcome: Progressing  5/25/2025 1326 by Hailee Deluna RN  Outcome: Progressing

## 2025-05-25 NOTE — CARE PLAN
Pt is very lethargic today with BP little softer    Will discontinue Zoloft and Remeron for now  Please follow parameters on BP meds

## 2025-05-26 LAB
ALBUMIN SERPL BCP-MCNC: 2.8 G/DL (ref 3.4–5)
ALP SERPL-CCNC: 105 U/L (ref 33–136)
ALT SERPL W P-5'-P-CCNC: 21 U/L (ref 7–45)
ANION GAP SERPL CALC-SCNC: 14 MMOL/L (ref 10–20)
AST SERPL W P-5'-P-CCNC: 29 U/L (ref 9–39)
BILIRUB SERPL-MCNC: 0.5 MG/DL (ref 0–1.2)
BNP SERPL-MCNC: 513 PG/ML (ref 0–99)
BUN SERPL-MCNC: 54 MG/DL (ref 6–23)
CALCIUM SERPL-MCNC: 8.5 MG/DL (ref 8.6–10.6)
CHLORIDE SERPL-SCNC: 106 MMOL/L (ref 98–107)
CO2 SERPL-SCNC: 22 MMOL/L (ref 21–32)
CREAT SERPL-MCNC: 2.56 MG/DL (ref 0.5–1.05)
EGFRCR SERPLBLD CKD-EPI 2021: 16 ML/MIN/1.73M*2
ERYTHROCYTE [DISTWIDTH] IN BLOOD BY AUTOMATED COUNT: 15.5 % (ref 11.5–14.5)
GLUCOSE SERPL-MCNC: 94 MG/DL (ref 74–99)
HCT VFR BLD AUTO: 31.7 % (ref 36–46)
HGB BLD-MCNC: 9.6 G/DL (ref 12–16)
MCH RBC QN AUTO: 28.5 PG (ref 26–34)
MCHC RBC AUTO-ENTMCNC: 30.3 G/DL (ref 32–36)
MCV RBC AUTO: 94 FL (ref 80–100)
NRBC BLD-RTO: 0 /100 WBCS (ref 0–0)
PLATELET # BLD AUTO: 120 X10*3/UL (ref 150–450)
POTASSIUM SERPL-SCNC: 3.1 MMOL/L (ref 3.5–5.3)
PROT SERPL-MCNC: 5.5 G/DL (ref 6.4–8.2)
RBC # BLD AUTO: 3.37 X10*6/UL (ref 4–5.2)
SODIUM SERPL-SCNC: 139 MMOL/L (ref 136–145)
WBC # BLD AUTO: 10.7 X10*3/UL (ref 4.4–11.3)

## 2025-05-26 PROCEDURE — 2500000001 HC RX 250 WO HCPCS SELF ADMINISTERED DRUGS (ALT 637 FOR MEDICARE OP): Performed by: STUDENT IN AN ORGANIZED HEALTH CARE EDUCATION/TRAINING PROGRAM

## 2025-05-26 PROCEDURE — 2500000004 HC RX 250 GENERAL PHARMACY W/ HCPCS (ALT 636 FOR OP/ED): Mod: JZ | Performed by: STUDENT IN AN ORGANIZED HEALTH CARE EDUCATION/TRAINING PROGRAM

## 2025-05-26 PROCEDURE — 85027 COMPLETE CBC AUTOMATED: CPT | Performed by: STUDENT IN AN ORGANIZED HEALTH CARE EDUCATION/TRAINING PROGRAM

## 2025-05-26 PROCEDURE — 99233 SBSQ HOSP IP/OBS HIGH 50: CPT | Performed by: STUDENT IN AN ORGANIZED HEALTH CARE EDUCATION/TRAINING PROGRAM

## 2025-05-26 PROCEDURE — 1200000002 HC GENERAL ROOM WITH TELEMETRY DAILY

## 2025-05-26 PROCEDURE — 80053 COMPREHEN METABOLIC PANEL: CPT | Performed by: STUDENT IN AN ORGANIZED HEALTH CARE EDUCATION/TRAINING PROGRAM

## 2025-05-26 PROCEDURE — 36415 COLL VENOUS BLD VENIPUNCTURE: CPT | Performed by: STUDENT IN AN ORGANIZED HEALTH CARE EDUCATION/TRAINING PROGRAM

## 2025-05-26 PROCEDURE — 83880 ASSAY OF NATRIURETIC PEPTIDE: CPT | Performed by: STUDENT IN AN ORGANIZED HEALTH CARE EDUCATION/TRAINING PROGRAM

## 2025-05-26 RX ORDER — POTASSIUM CHLORIDE 1.5 G/1.58G
40 POWDER, FOR SOLUTION ORAL
Status: COMPLETED | OUTPATIENT
Start: 2025-05-26 | End: 2025-05-26

## 2025-05-26 RX ORDER — SODIUM CHLORIDE, SODIUM LACTATE, POTASSIUM CHLORIDE, CALCIUM CHLORIDE 600; 310; 30; 20 MG/100ML; MG/100ML; MG/100ML; MG/100ML
50 INJECTION, SOLUTION INTRAVENOUS CONTINUOUS
Status: ACTIVE | OUTPATIENT
Start: 2025-05-26 | End: 2025-05-27

## 2025-05-26 RX ADMIN — METOPROLOL SUCCINATE 100 MG: 100 TABLET, EXTENDED RELEASE ORAL at 09:22

## 2025-05-26 RX ADMIN — Medication 50 MCG: at 09:22

## 2025-05-26 RX ADMIN — ATORVASTATIN CALCIUM 40 MG: 40 TABLET, FILM COATED ORAL at 20:59

## 2025-05-26 RX ADMIN — SALINE NASAL SPRAY 1 SPRAY: 1.5 SOLUTION NASAL at 09:22

## 2025-05-26 RX ADMIN — BRIMONIDINE TARTRATE 1 DROP: 2 SOLUTION/ DROPS OPHTHALMIC at 20:59

## 2025-05-26 RX ADMIN — BRIMONIDINE TARTRATE 1 DROP: 2 SOLUTION/ DROPS OPHTHALMIC at 09:22

## 2025-05-26 RX ADMIN — POTASSIUM CHLORIDE 40 MEQ: 1.5 POWDER, FOR SOLUTION ORAL at 22:34

## 2025-05-26 RX ADMIN — POTASSIUM CHLORIDE 40 MEQ: 1.5 POWDER, FOR SOLUTION ORAL at 18:35

## 2025-05-26 RX ADMIN — DORZOLAMIDE HYDROCHLORIDE AND TIMOLOL MALEATE 1 DROP: 20; 5 SOLUTION OPHTHALMIC at 20:58

## 2025-05-26 RX ADMIN — SODIUM CHLORIDE, SODIUM LACTATE, POTASSIUM CHLORIDE, AND CALCIUM CHLORIDE 50 ML/HR: .6; .31; .03; .02 INJECTION, SOLUTION INTRAVENOUS at 15:14

## 2025-05-26 RX ADMIN — SALINE NASAL SPRAY 1 SPRAY: 1.5 SOLUTION NASAL at 21:00

## 2025-05-26 RX ADMIN — SODIUM CHLORIDE, POTASSIUM CHLORIDE, SODIUM LACTATE AND CALCIUM CHLORIDE 75 ML/HR: 600; 310; 30; 20 INJECTION, SOLUTION INTRAVENOUS at 04:11

## 2025-05-26 RX ADMIN — SENNOSIDES AND DOCUSATE SODIUM 2 TABLET: 50; 8.6 TABLET ORAL at 20:59

## 2025-05-26 RX ADMIN — LATANOPROST 1 DROP: 50 SOLUTION OPHTHALMIC at 20:59

## 2025-05-26 RX ADMIN — SENNOSIDES AND DOCUSATE SODIUM 2 TABLET: 50; 8.6 TABLET ORAL at 09:22

## 2025-05-26 RX ADMIN — APIXABAN 2.5 MG: 2.5 TABLET, FILM COATED ORAL at 09:22

## 2025-05-26 RX ADMIN — APIXABAN 2.5 MG: 2.5 TABLET, FILM COATED ORAL at 21:00

## 2025-05-26 RX ADMIN — DORZOLAMIDE HYDROCHLORIDE AND TIMOLOL MALEATE 1 DROP: 20; 5 SOLUTION OPHTHALMIC at 09:46

## 2025-05-26 ASSESSMENT — COGNITIVE AND FUNCTIONAL STATUS - GENERAL
MOVING TO AND FROM BED TO CHAIR: A LOT
WALKING IN HOSPITAL ROOM: A LOT
TOILETING: TOTAL
DRESSING REGULAR LOWER BODY CLOTHING: A LOT
TOILETING: A LOT
STANDING UP FROM CHAIR USING ARMS: TOTAL
PERSONAL GROOMING: A LOT
DRESSING REGULAR UPPER BODY CLOTHING: A LOT
DRESSING REGULAR UPPER BODY CLOTHING: A LOT
WALKING IN HOSPITAL ROOM: TOTAL
MOVING FROM LYING ON BACK TO SITTING ON SIDE OF FLAT BED WITH BEDRAILS: A LITTLE
EATING MEALS: A LITTLE
MOVING TO AND FROM BED TO CHAIR: TOTAL
TURNING FROM BACK TO SIDE WHILE IN FLAT BAD: A LOT
MOBILITY SCORE: 10
CLIMB 3 TO 5 STEPS WITH RAILING: TOTAL
MOVING FROM LYING ON BACK TO SITTING ON SIDE OF FLAT BED WITH BEDRAILS: A LOT
MOBILITY SCORE: 10
HELP NEEDED FOR BATHING: A LOT
STANDING UP FROM CHAIR USING ARMS: TOTAL
DAILY ACTIVITIY SCORE: 11
TURNING FROM BACK TO SIDE WHILE IN FLAT BAD: A LITTLE
HELP NEEDED FOR BATHING: A LOT
EATING MEALS: A LOT
DRESSING REGULAR LOWER BODY CLOTHING: A LOT
DAILY ACTIVITIY SCORE: 13
CLIMB 3 TO 5 STEPS WITH RAILING: TOTAL
PERSONAL GROOMING: A LOT

## 2025-05-26 ASSESSMENT — PAIN SCALES - GENERAL
PAINLEVEL_OUTOF10: 0 - NO PAIN

## 2025-05-26 ASSESSMENT — PAIN - FUNCTIONAL ASSESSMENT: PAIN_FUNCTIONAL_ASSESSMENT: 0-10

## 2025-05-26 ASSESSMENT — PAIN SCALES - PAIN ASSESSMENT IN ADVANCED DEMENTIA (PAINAD): BREATHING: NORMAL

## 2025-05-26 ASSESSMENT — PAIN SCALES - WONG BAKER: WONGBAKER_NUMERICALRESPONSE: NO HURT

## 2025-05-26 NOTE — CARE PLAN
The patient's goals for the shift include Pat ient will remain safe and free from injury and falls during shift    The clinical goals for the shift include patient will have an adequate nutrition within the shift      Problem: Pain - Adult  Goal: Verbalizes/displays adequate comfort level or baseline comfort level  Outcome: Progressing

## 2025-05-26 NOTE — PROGRESS NOTES
.                                                                                                                                                                                                                                                                                             INTERNAL MEDICINE PROGRESS NOTE     BRIEF NARRATIVE      Susan Patiño is a 89 y.o. female on day 10 of admission presenting with Altered mental status, unspecified altered mental status type.    ASSESSMENT / PLANS      Acute kidney injury  Acute urinary retention  Mild hypokalemia    -Creatinine 0.99 on 5/15/25 . Cr 5.29 --> 5.31 ---> 2.56 today   -Etiology multifactorial, likely ATN. ddx include diuresis, BLAINE vs AIN,  less likely cardiorenal   -Renal ultrasound noted bilateral kidney echogenicity, multiple cysts, no evidence of hydronephrosis    -Nephrology following, no indication for RRT. Discussed with nephro today and they are satisfied with creatinine and encourage IVF when able. Outpatient nephro follow up in 2-3 weeks   -Cr with no significant improvement, UOP improving   -Encourage oral intake. Will give gentle IVF @50cc/hr for another 24 hours. Pt is more alert with better UOP today with IVF  -Repeat BNP today is stable   -Daily RFPs, avoid nephrotoxic agents   -Replace electrolytes as need   -Pt did develop urinary retention again requiring lam reinsertion on 5/26. Pt might need to be discharge with lam with outpatient urology follow up   -Initiated hospice conversation with family , they would to consider. Hospice consulted     Acute metabolic encephalopathy 2/2 UTI   Delirium   Generalized weakness   -No significant cognitive issues at baseline  -CTH non acute   -Encephalopathy presumed secondary to UTI   -Now improving but not fully at baseline   -PT/OT daily, recommended moderate intensity. Discussed with family today   -Discussed with TCC regarding discharge dispo. Pt does need precert to return to facility        Acute cystitis   Klebsiella UTI with pyuria   -Continue IV Rocephin.  -Urine culture from 5/15 growing Klebsiella pneumonia. Will treat with ceftriaxone for 7 days (EOT 5/22)   -CTM         Acute on chronic diastolic CHF exacerbation  -BNP elevated to the 200s, chest imaging showing pulmonary edema, hypoxia and bilateral lower extremity edema  -Status post Lasix 40 mg IV in the emergency department 5/15 night and another dose of Lasix 20 mg IV x 1 on 5/16  -Continue holding diuresis, last diuresis 5/16, 2/2 renal impairment      Acute on chronic hypoxic respiratory failure on home oxygen  -Baseline oxygen is 2 L O2 nasal cannula as needed.  -Patient initially presented with requirement of 6 L O2 nasal cannula.  Now currently down to 1 L O2 nasal cannula.  Continue weaning oxygen as tolerated.     Essential HTN   Hypotension   BP now stable. Cont to monitor closely to avoid hypotension in the setting of possible ATN     CT imaging with AAA and aortic dissection  -Vascular surgery consulted no vascular surgery intervention recommended AAA and aortic dissection appears chronic.     History of paroxysmal A-fib  -Currently rate controlled A-fib.  -Continue apixaban 2.5 mg twice daily  -Continue home metoprolol succinate 100 mg daily     HLD  - Continue home atorvastatin 40 mg nightly     Glaucoma  - Continue home drops     Disposition: Patient mentally has improved, renal function improving.     Time > 55 mins     SUBJECTIVE       Pt appeared more alert and awake today with good urine output. Less agitated overnight     OBJECTIVE      Visit Vitals  BP 98/57   Pulse 66   Temp 36.1 °C (97 °F)   Resp 17        Intake/Output Summary (Last 24 hours) at 5/26/2025 1240  Last data filed at 5/26/2025 0700  Gross per 24 hour   Intake 800 ml   Output 450 ml   Net 350 ml       Physical Exam   General: Lying in bed without distress.  Frail-appearing.  Cooperative.  Mildly hard of hearing.  Skin: No rashes or ulcerations.  HEENT:  Sclera is white.  Mucous membranes moist.  Neck: Supple.  No JVD.  Cardiac: Regular rate and rhythm, S1/S2 normal.  Lungs: Clear to auscultation bilaterally, no wheezing or crackles, no accessory muscle use at rest.  Abdomen: Soft, nontender, nondistended, BS +  Extremities: No cyanosis.  No lower extremity edema.  Neurologic: Alert, responsive and answering questions today, oriented to self, partially to place, not to time.  No focal deficits.  Psychiatric: Appropriate mood and behavior.  Currently no agitation.    Current Meds   Scheduled Medications[1]   PRN Medications[2]     LABS and IMAGING     WBC   Date Value Ref Range Status   05/26/2025 10.7 4.4 - 11.3 x10*3/uL Final   05/25/2025 12.2 (H) 4.4 - 11.3 x10*3/uL Final   05/24/2025 17.3 (H) 4.4 - 11.3 x10*3/uL Final     Hemoglobin   Date Value Ref Range Status   05/26/2025 9.6 (L) 12.0 - 16.0 g/dL Final   05/25/2025 9.8 (L) 12.0 - 16.0 g/dL Final   05/24/2025 10.3 (L) 12.0 - 16.0 g/dL Final     Hematocrit   Date Value Ref Range Status   05/26/2025 31.7 (L) 36.0 - 46.0 % Final   05/25/2025 33.5 (L) 36.0 - 46.0 % Final   05/24/2025 33.8 (L) 36.0 - 46.0 % Final     Bicarbonate   Date Value Ref Range Status   05/26/2025 22 21 - 32 mmol/L Final   05/25/2025 22 21 - 32 mmol/L Final   05/24/2025 21 21 - 32 mmol/L Final     Creatinine   Date Value Ref Range Status   05/26/2025 2.56 (H) 0.50 - 1.05 mg/dL Final   05/25/2025 3.26 (H) 0.50 - 1.05 mg/dL Final   05/24/2025 3.13 (H) 0.50 - 1.05 mg/dL Final     Calcium   Date Value Ref Range Status   05/26/2025 8.5 (L) 8.6 - 10.6 mg/dL Final   05/25/2025 8.6 8.6 - 10.6 mg/dL Final   05/24/2025 8.9 8.6 - 10.6 mg/dL Final       US renal complete  Narrative: Interpreted By:  Reed Hernandez and Hofer Lindsay   STUDY:  US RENAL COMPLETE;  5/19/2025 1:53 pm      INDICATION:  Signs/Symptoms:progressive CODY.          COMPARISON:  CT chest abdomen and pelvis 05/15/2025.      ACCESSION NUMBER(S):  [id number]       ORDERING CLINICIAN:  CORRIE YORK      TECHNIQUE:  Multiple images of the kidneys were obtained.      FINDINGS:  Limitations due to bowel gas and body habitus.      RIGHT KIDNEY:  The right kidney measures 10.92 cm in length. The renal cortex is  echogenic. No hydronephrosis is present. No evidence of  nephrolithiasis. Multiple anechoic structures with posterior acoustic  enhancement and no internal vascularity, incompletely characterized  on ultrasound, but most consistent with simple renal cysts measuring  up to 4.6 x 3.1 x 3.3 cm. The previously seen enhancing lesion within  the right kidney is not definitively visualized on ultrasound, likely  due to limited exam.      LEFT KIDNEY:  The left kidney is poorly visualized due to bowel gas and body  habitus. The visualized left kidney measures 9.83 cm in length. The  renal cortex is echogenic. No hydronephrosis is present. No evidence  of nephrolithiasis. Hypoechoic structure within the left kidney with  posterior acoustic enhancement, which is incompletely evaluated on  ultrasound, but most consistent with a simple renal cyst.      BLADDER:  The urinary bladder is unremarkable in appearance.      Impression: 1. The previously described enhancing nodule in the right kidney from  prior CT chest abdomen and pelvis dated 05/15/2025 is not  definitively visualized on ultrasound examination, likely due to  limitations above. Consider MRI for further characterization in case  of persistent concern.  2. The bilateral kidneys are echogenic, which can be seen in the  setting of medical renal disease. Limited evaluation of the left  kidney as above. Multiple cysts throughout the bilateral kidneys. No  evidence of hydronephrosis.      I personally reviewed the images/study and resident's interpretation  and I agree with the findings as stated by Scarlett Gallego MD (resident  radiologist). This study was analyzed and interpreted at [facility]  [place], [place].      MACRO:  None          Signed by: Reed Miramontes 5/19/2025 10:06 PM  Dictation workstation:   [id number]         PROBLEM LISTS      Problem List Items Addressed This Visit          Neuro    * (Principal) RESOLVED: Altered mental status, unspecified altered mental status type - Primary     Other Visit Diagnoses         Acute cystitis without hematuria        Relevant Medications    acetaminophen (Tylenol) 325 mg tablet            This dictation was created with voice recognition software. Although every effort is made to review the dictation as it is transcribed, on occasion spoken words, phrases, names, numbers, punctuation etc can be misinterpreted by the the technology leading to omissions or inappropriate outcomes.     Sandra Ward MD                 [1] apixaban, 2.5 mg, oral, BID  atorvastatin, 40 mg, oral, Nightly  brimonidine, 1 drop, Left Eye, BID  cholecalciferol, 50 mcg, oral, Daily  dorzolamide-timoloL, 1 drop, Left Eye, BID  latanoprost, 1 drop, Left Eye, Nightly  [Held by provider] lisinopril, 10 mg, oral, Daily  metoprolol succinate XL, 100 mg, oral, Daily  [Held by provider] mirtazapine, 7.5 mg, oral, Nightly  sennosides-docusate sodium, 2 tablet, oral, BID  [Held by provider] sertraline, 25 mg, oral, Daily  sodium chloride, 1 spray, Each Nostril, BID     [2] PRN medications: acetaminophen **OR** acetaminophen **OR** acetaminophen, diclofenac sodium, ipratropium-albuteroL, labetaloL, OLANZapine zydis **OR** OLANZapine, ondansetron **OR** ondansetron

## 2025-05-26 NOTE — CARE PLAN
The patient's goals for the shift include Pat ient will remain safe and free from injury and falls during shift    The clinical goals for the shift include pt will remain free of falls during this shift    Problem: Safety - Medical Restraint  Goal: Remains free of injury from restraints (Restraint for Interference with Medical Device)  Outcome: Progressing     Problem: Pain - Adult  Goal: Verbalizes/displays adequate comfort level or baseline comfort level  Outcome: Progressing     Problem: Safety - Adult  Goal: Free from fall injury  Outcome: Progressing     Problem: Skin  Goal: Participates in plan/prevention/treatment measures  Outcome: Progressing  Goal: Prevent/manage excess moisture  Outcome: Progressing  Goal: Prevent/minimize sheer/friction injuries  Outcome: Progressing  Flowsheets (Taken 5/26/2025 0658)  Prevent/minimize sheer/friction injuries:   Use pull sheet   Turn/reposition every 2 hours/use positioning/transfer devices

## 2025-05-26 NOTE — CARE PLAN
The patient's goals for the shift include Pat ient will remain safe and free from injury and falls during shift    The clinical goals for the shift include patient will have an adequate nutrition within the shift    Problem: Nutrition  Goal: Nutrient intake appropriate for maintaining nutritional needs  Outcome: Progressing     Problem: Skin  Goal: Decreased wound size/increased tissue granulation at next dressing change  Outcome: Progressing  Flowsheets (Taken 5/25/2025 1326 by Hailee Deluna RN)  Decreased wound size/increased tissue granulation at next dressing change: Protective dressings over bony prominences  Goal: Participates in plan/prevention/treatment measures  Outcome: Progressing  Goal: Prevent/manage excess moisture  Outcome: Progressing  Goal: Prevent/minimize sheer/friction injuries  Outcome: Progressing  Goal: Promote/optimize nutrition  Outcome: Progressing  Goal: Promote skin healing  Outcome: Progressing

## 2025-05-27 VITALS
WEIGHT: 110.23 LBS | OXYGEN SATURATION: 93 % | TEMPERATURE: 97.7 F | HEART RATE: 68 BPM | DIASTOLIC BLOOD PRESSURE: 81 MMHG | SYSTOLIC BLOOD PRESSURE: 132 MMHG | RESPIRATION RATE: 19 BRPM | BODY MASS INDEX: 20.81 KG/M2 | HEIGHT: 61 IN

## 2025-05-27 LAB
ALBUMIN SERPL BCP-MCNC: 3.2 G/DL (ref 3.4–5)
ALP SERPL-CCNC: 119 U/L (ref 33–136)
ALT SERPL W P-5'-P-CCNC: 23 U/L (ref 7–45)
ANION GAP SERPL CALC-SCNC: 14 MMOL/L (ref 10–20)
AST SERPL W P-5'-P-CCNC: 26 U/L (ref 9–39)
BILIRUB SERPL-MCNC: 0.7 MG/DL (ref 0–1.2)
BUN SERPL-MCNC: 45 MG/DL (ref 6–23)
CALCIUM SERPL-MCNC: 8.9 MG/DL (ref 8.6–10.6)
CHLORIDE SERPL-SCNC: 108 MMOL/L (ref 98–107)
CO2 SERPL-SCNC: 22 MMOL/L (ref 21–32)
CREAT SERPL-MCNC: 1.88 MG/DL (ref 0.5–1.05)
EGFRCR SERPLBLD CKD-EPI 2021: 24 ML/MIN/1.73M*2
ERYTHROCYTE [DISTWIDTH] IN BLOOD BY AUTOMATED COUNT: 15.4 % (ref 11.5–14.5)
GLUCOSE BLD MANUAL STRIP-MCNC: 111 MG/DL (ref 74–99)
GLUCOSE SERPL-MCNC: 99 MG/DL (ref 74–99)
HCT VFR BLD AUTO: 36.9 % (ref 36–46)
HGB BLD-MCNC: 11.2 G/DL (ref 12–16)
MCH RBC QN AUTO: 28.4 PG (ref 26–34)
MCHC RBC AUTO-ENTMCNC: 30.4 G/DL (ref 32–36)
MCV RBC AUTO: 94 FL (ref 80–100)
NRBC BLD-RTO: 0 /100 WBCS (ref 0–0)
PLATELET # BLD AUTO: 142 X10*3/UL (ref 150–450)
POTASSIUM SERPL-SCNC: 3.7 MMOL/L (ref 3.5–5.3)
PROT SERPL-MCNC: 6.2 G/DL (ref 6.4–8.2)
RBC # BLD AUTO: 3.94 X10*6/UL (ref 4–5.2)
SODIUM SERPL-SCNC: 140 MMOL/L (ref 136–145)
WBC # BLD AUTO: 14.3 X10*3/UL (ref 4.4–11.3)

## 2025-05-27 PROCEDURE — 82947 ASSAY GLUCOSE BLOOD QUANT: CPT

## 2025-05-27 PROCEDURE — 80053 COMPREHEN METABOLIC PANEL: CPT | Performed by: STUDENT IN AN ORGANIZED HEALTH CARE EDUCATION/TRAINING PROGRAM

## 2025-05-27 PROCEDURE — 99239 HOSP IP/OBS DSCHRG MGMT >30: CPT | Performed by: INTERNAL MEDICINE

## 2025-05-27 PROCEDURE — 2500000004 HC RX 250 GENERAL PHARMACY W/ HCPCS (ALT 636 FOR OP/ED): Mod: JZ | Performed by: STUDENT IN AN ORGANIZED HEALTH CARE EDUCATION/TRAINING PROGRAM

## 2025-05-27 PROCEDURE — 85027 COMPLETE CBC AUTOMATED: CPT | Performed by: STUDENT IN AN ORGANIZED HEALTH CARE EDUCATION/TRAINING PROGRAM

## 2025-05-27 PROCEDURE — 2500000001 HC RX 250 WO HCPCS SELF ADMINISTERED DRUGS (ALT 637 FOR MEDICARE OP): Performed by: STUDENT IN AN ORGANIZED HEALTH CARE EDUCATION/TRAINING PROGRAM

## 2025-05-27 PROCEDURE — 36415 COLL VENOUS BLD VENIPUNCTURE: CPT | Performed by: STUDENT IN AN ORGANIZED HEALTH CARE EDUCATION/TRAINING PROGRAM

## 2025-05-27 RX ORDER — FUROSEMIDE 20 MG/1
20 TABLET ORAL DAILY PRN
Start: 2025-05-27

## 2025-05-27 RX ADMIN — BRIMONIDINE TARTRATE 1 DROP: 2 SOLUTION/ DROPS OPHTHALMIC at 08:26

## 2025-05-27 RX ADMIN — SODIUM CHLORIDE, SODIUM LACTATE, POTASSIUM CHLORIDE, AND CALCIUM CHLORIDE 50 ML/HR: .6; .31; .03; .02 INJECTION, SOLUTION INTRAVENOUS at 11:46

## 2025-05-27 RX ADMIN — APIXABAN 2.5 MG: 2.5 TABLET, FILM COATED ORAL at 08:27

## 2025-05-27 RX ADMIN — SALINE NASAL SPRAY 1 SPRAY: 1.5 SOLUTION NASAL at 08:25

## 2025-05-27 RX ADMIN — Medication 50 MCG: at 08:27

## 2025-05-27 ASSESSMENT — COGNITIVE AND FUNCTIONAL STATUS - GENERAL
PERSONAL GROOMING: TOTAL
MOVING FROM LYING ON BACK TO SITTING ON SIDE OF FLAT BED WITH BEDRAILS: A LOT
EATING MEALS: A LOT
TURNING FROM BACK TO SIDE WHILE IN FLAT BAD: A LOT
STANDING UP FROM CHAIR USING ARMS: TOTAL
MOVING TO AND FROM BED TO CHAIR: A LOT
DRESSING REGULAR UPPER BODY CLOTHING: TOTAL
DRESSING REGULAR LOWER BODY CLOTHING: TOTAL
HELP NEEDED FOR BATHING: TOTAL
DAILY ACTIVITIY SCORE: 7
TOILETING: TOTAL
WALKING IN HOSPITAL ROOM: TOTAL
CLIMB 3 TO 5 STEPS WITH RAILING: TOTAL
MOBILITY SCORE: 9

## 2025-05-27 ASSESSMENT — PAIN SCALES - GENERAL
PAINLEVEL_OUTOF10: 0 - NO PAIN

## 2025-05-27 ASSESSMENT — PAIN - FUNCTIONAL ASSESSMENT
PAIN_FUNCTIONAL_ASSESSMENT: 0-10
PAIN_FUNCTIONAL_ASSESSMENT: 0-10

## 2025-05-27 NOTE — PROGRESS NOTES
Per Dr. Gardner, patient is medically ready for discharge today. [facility] can accept. Transport confirmed with [facility] for 6pm. Patient's son notified. Nurse, medical team, patient's family, and SNF in agreement with discharge plans. Blue sheet provided to nursing unit. Report: [phone]. Cara Geller RN, TCC

## 2025-05-27 NOTE — DISCHARGE SUMMARY
Discharge Diagnosis  Acute kidney injury  2.   Acute urine retention  3.   Acute metabolic encephalopathy  4.   Acute cystitis without hematuria, complicated   5.   Acute on chronic diastolic congestive heart failure    Issues Requiring Follow-Up  Schedule request has been made for patient to follow-up with urology outpatient for urinary retention and voiding trial.    Discharge Meds     Medication List      CHANGE how you take these medications     * acetaminophen 500 mg tablet; Commonly known as: Tylenol; What changed:   Another medication with the same name was added. Make sure you understand   how and when to take each.   * acetaminophen 325 mg tablet; Commonly known as: Tylenol; Take 2   tablets (650 mg) by mouth every 4 hours if needed (any level of pain or   fever).; What changed: You were already taking a medication with the same   name, and this prescription was added. Make sure you understand how and   when to take each.   furosemide 20 mg tablet; Commonly known as: Lasix; Take 1 tablet (20 mg)   by mouth once daily as needed (Increasing swelling or dyspnea).; What   changed: when to take this, reasons to take this   ipratropium-albuteroL 0.5-2.5 mg/3 mL nebulizer solution; Commonly known   as: Duo-Neb; What changed: Another medication with the same name was   removed. Continue taking this medication, and follow the directions you   see here.  * This list has 2 medication(s) that are the same as other medications   prescribed for you. Read the directions carefully, and ask your doctor or   other care provider to review them with you.     CONTINUE taking these medications     atorvastatin 40 mg tablet; Commonly known as: Lipitor   brimonidine 0.2 % ophthalmic solution; Commonly known as: AlphaGAN   cholecalciferol 50 mcg (2,000 units) tablet; Commonly known as: Vitamin   D-3   diclofenac sodium 1 % gel; Commonly known as: Voltaren   dorzolamide-timoloL 22.3-6.8 mg/mL ophthalmic solution; Commonly known    as: Cosopt   Eliquis 2.5 mg tablet; Generic drug: apixaban   latanoprost 0.005 % ophthalmic solution; Commonly known as: Xalatan   loratadine 10 mg tablet; Commonly known as: Claritin   metoprolol succinate  mg 24 hr tablet; Commonly known as:   Toprol-XL   mirtazapine 7.5 mg tablet; Commonly known as: Remeron   OCUVITE EYE HEALTH ORAL   ondansetron 4 mg tablet; Commonly known as: Zofran   sertraline 25 mg tablet; Commonly known as: Zoloft   sodium chloride 0.65 % nasal spray; Commonly known as: [place]     STOP taking these medications     lisinopril 10 mg tablet   potassium chloride CR 10 mEq ER tablet; Commonly known as: Klor-Con       Test Results Pending At Discharge  None.    Hospital Course  Susan Patiño is a 89-year-old F with a PMH of paroxysmal A-fib, Alzheimer's disease, CHF, CKD 3B, HTN, MDD, CAD, glaucoma, pulmonary hypertension, osteoarthritis, bilateral carotid artery stenosis, PVD, hyperlipidemia, chronic AAA and dissection, recurrent syncope who presents to [facility] from [facility] with complaints by family of confusion and altered mental status.  Patient was found to have acute cystitis and started on IV Rocephin.  Urine culture growing out Klebsiella.  Patient also noted to have pulmonary edema and felt to have acute on chronic diastolic ingestive heart failure on initial presentation.  Patient initially requiring 5 to 6 L of oxygen.  Patient given IV diuresis on 5/15 and 1 dose on 5/16.  Creatinine started to increase on 5/17, initially concerned that it could be related to diuresis and no further diuresis given.  It should be noted that patient also had contrast when initially evaluated on 5/15.  Over the next few days creatinine showed steady increase.  Patient developed acute kidney injury, due to recent CHF exacerbation did not initially give any IV fluids.  Held off on any further diuretics.  Creatinine kept increasing.  Nephrology consulted.  Renal ultrasound done did  not show any obstruction.  Patient had contrast for CT imaging initially on admission and this potentially also contributing to renal injury.  Nephrology feels acute kidney injury related to combination of contrast and fluctuating blood pressure and due to her age very little reserve.  Creatinine monitored, patient did not need any RRT.  Creatinine started showing improvement.  Creatinine peaked at 5.31, plateaued, then started showing improvement, last creatinine 1.88.  During hospitalization patient also noted to have urinary retention issues and failed voiding trial.  Patient will need to be discharged with indwelling Crawford catheter and outpatient urology follow-up for voiding trial.  On the day of discharge patient appeared mildly sleepy but reviewed medications and no medications contributing.  Patient remained alert and woke up for breakfast and lunch without issues.  Patient has demonstrated that she can wake up and stay awake.  Family was at bedside and updated.  Family brought up the conversation about hospice.  Lengthy discussion had with family, they were looking to see if they can get extra help with patient, they were not necessarily reviewing patient as terminal and looking for comfort care only.  Will request facility that she is going back to have hospice come and talk to them to see what they can offer, but based on family's wishes they seem to still be wanting to be somewhat aggressive with certain treatment for now.  Hospice can definitely still be a possibility if patient worsens.  As of right now family is hoping patient can do physical therapy and improve.  Patient is okay to return to long-term facility.    Assessment and plan:    Acute kidney injury on chronic kidney disease stage IIIa  Acute urinary retention  Mild hypokalemia    -Creatinine 0.99 on 5/15/25 .  Baseline creatinine around 51, creatinine increased and peaked at 5.31 then plateaued.  Has been improving since then.  Last  creatinine on the day of discharge 1.88.  -Etiology multifactorial, likely contrast related ATN.  Potentially in combination with diuresis and old age and little reserve.  -Renal ultrasound noted bilateral kidney echogenicity, multiple cysts, no evidence of hydronephrosis    -Nephrology following, has indicated patient okay for discharge.  Patient never needed RRT.  -Patient encouraged to have oral hydration but the same time monitor for issues with congestive heart failure.  -avoid nephrotoxic agents   -Repeat BMP ordered in 3 days.  -Pt did develop urinary retention again requiring lam reinsertion on 5/26.  Outpatient urology follow-up requested.  - Patient family inquiring about hospice.  At this time patient's family is still hoping for some acute care including physical therapy, unsure of their goals for her to improve necessary if it is hospice.  I did have lengthy discussion with family at bedside that hospice is a consideration if patient worsens, has decreased quality of life, or no longer wants aggressive care.  Requested facility to get hospice to talk to patient and family when they arrive to facility to see what they can offer.     Acute metabolic encephalopathy 2/2 UTI   Delirium   Generalized weakness   -No significant cognitive issues at baseline  -CTH non acute   -Encephalopathy presumed secondary to UTI   -Now improving but not fully at baseline   -PT/OT daily, recommended moderate intensity.  - Discharge to skilled nursing facility.     Acute cystitis without hematuria  Klebsiella UTI   -Patient finished treatment with IV Rocephin for total 7 days.       Acute on chronic diastolic CHF exacerbation  -BNP elevated to the 200s, chest imaging showing pulmonary edema, hypoxia and bilateral lower extremity edema  -Status post Lasix 40 mg IV in the emergency department 5/15 night and another dose of Lasix 20 mg IV x 1 on 5/16  - No further diuresis after that.  -No significant respiratory distress,  still requiring 1 L O2 nasal cannula.  Will have to find a balance between congestive heart failure and kidney injury.     Acute on chronic hypoxic respiratory failure on home oxygen  -Baseline oxygen is 2 L O2 nasal cannula as needed.  -Patient initially presented with requirement of 6 L O2 nasal cannula.  Now currently down to 1 L O2 nasal cannula.  Continue weaning oxygen as tolerated.     Essential HTN   Hypotension   -No evidence of hypotension but would recommend sudden large swings in blood pressure which can cause further renal injury.  -On the day of discharge systolic blood pressure 110s to 150s.     CT imaging with AAA and aortic dissection  -Vascular surgery consulted no vascular surgery intervention recommended AAA and aortic dissection appears chronic.     History of paroxysmal A-fib  -Currently rate controlled A-fib.  -Continue apixaban 2.5 mg twice daily  -Continue home metoprolol succinate 100 mg daily     HLD  - Continue home atorvastatin 40 mg nightly     Glaucoma  - Continue home drops    Time spent caring for patient and coordinating discharge total 35 minutes.    Pertinent Physical Exam At Time of Discharge  General: Lying in bed without distress.  Frail-appearing.  Cooperative.  Mildly hard of hearing.  Skin: No rashes or ulcerations.  HEENT: Sclera is white.  Mucous membranes moist.  Neck: Supple.  No JVD.  Cardiac: Regular rate and rhythm, S1/S2 normal.  Lungs: Clear to auscultation bilaterally, no wheezing or crackles, no accessory muscle use at rest.  Abdomen: Soft, nontender, nondistended, BS +  Extremities: No cyanosis.  No lower extremity edema.  Neurologic: Appears sleepy at first but does eventually wake up with enough stimulation.  Alert, responsive and answering questions today, oriented to self, partially to place, not to time.  No focal deficits.  In discussion with bedside nursing patient has remained awake during meals without any problems.  Psychiatric: Pleasant, currently no  agitation.    Outpatient Follow-Up  No future appointments.      Paulino Gardner MD

## 2025-05-27 NOTE — NURSING NOTE
Discharge note Patient discharged home via ems . Family at bedside . Iv removed. Belongings with family

## 2025-05-27 NOTE — CARE PLAN
The patient's goals for the shift include Pat ient will remain safe and free from injury and falls during shift    The clinical goals for the shift include patient will have adequate nutrition within the shift      Problem: Nutrition  Goal: Nutrient intake appropriate for maintaining nutritional needs  Outcome: Progressing     Problem: Skin  Goal: Decreased wound size/increased tissue granulation at next dressing change  Outcome: Progressing  Flowsheets (Taken 5/27/2025 8870)  Decreased wound size/increased tissue granulation at next dressing change: Protective dressings over bony prominences  Goal: Participates in plan/prevention/treatment measures  Outcome: Progressing  Goal: Prevent/manage excess moisture  Outcome: Progressing  Goal: Prevent/minimize sheer/friction injuries  Outcome: Progressing  Goal: Promote/optimize nutrition  Outcome: Progressing  Goal: Promote skin healing  Outcome: Progressing

## 2025-05-27 NOTE — PROGRESS NOTES
"Nutrition Follow Up Assessment  Nutrition Assessment         Patient is slowly improving however she is not yet back at baseline in setting of acute metabolic encephalopathy secondary to UTI.  Plan for discharge back to SNF when medically stable.       Nutrition History:     Patient does not provide nutrition history.  She is requiring assistance from staff with all meals (staff is ordering patient meals TID) and continued to require 1:1 feeds.  Her intake remains less than 50% of meals TID, she has Boost [facility] ordered BID.  No nausea/vomiting or pain.    Anthropometrics:  Height: 154.9 cm (5' 1\")   Weight: 50 kg (110 lb 3.7 oz)   BMI (Calculated): 20.84  IBW/kg (Dietitian Calculated): 105 kg     Nutrition Significant Labs:  BMP Trend:   Results from last 7 days   Lab Units 05/27/25  0619 05/26/25  0538 05/25/25  0546 05/24/25  0539   GLUCOSE mg/dL 99 94 95 74   CALCIUM mg/dL 8.9 8.5* 8.6 8.9   SODIUM mmol/L 140 139 139 141   POTASSIUM mmol/L 3.7 3.1* 3.2* 3.1*   CO2 mmol/L 22 22 22 21   CHLORIDE mmol/L 108* 106 105 104   BUN mg/dL 45* 54* 64* 56*   CREATININE mg/dL 1.88* 2.56* 3.26* 3.13*        Nutrition Specific Medications:  Medications reviewed.    I/O:   Last BM Date: 05/26/25; Stool Appearance: Watery (05/27/25 0600)    Dietary Orders (From admission, onward)       Start     Ordered    05/19/25 1245  Oral nutritional supplements  Until discontinued        Question Answer Comment   Deliver with Breakfast    Deliver with Dinner    Select supplement: Boost [facility]        05/19/25 1244    05/18/25 1641  Adult diet 2-3 grams sodium; 1:1 Feeding  Diet effective now        Question Answer Comment   Diet type 2-3 grams sodium    Select tray type: 1:1 Feeding        05/18/25 1640    05/16/25 1652  May Participate in Room Service  ( ROOM SERVICE MAY PARTICIPATE)  Once        Question:  .  Answer:  Yes    05/16/25 1651                     Estimated Needs:   Total Energy Estimated Needs in 24 hours (kCal): 1259 kCal  Method " for Estimating Needs: ABW  Total Protein Estimated Needs in 24 Hours (g): 60 g  Method for Estimating 24 Hour Protein Needs: ABW    Nutrition Diagnosis   Malnutrition Diagnosis  Patient has Malnutrition Diagnosis: Yes  Diagnosis Status: Active  Malnutrition Diagnosis: Severe malnutrition related to chronic disease or condition  Related to: poor intake in setting of acute illness, chronic disease (Alzheimers), as well as advanced age  As Evidenced by: severe weight loss greater than 10% in the last 6 months and energy intake less than 50% of esimated needs for greater than 5 days.       Nutrition Interventions/Recommendations         Nutrition Prescription:  Individualized Nutrition Prescription Provided for : Recommend Adult Diet (2-3grams sodium) with 1:1 feeings assistnace at meals with Boost VHC BID (530 calories/22grams protein each)        Nutrition Interventions:   Food and/or Nutrient Delivery Interventions  Interventions: Meals and snacks  Meals and Snacks: General healthful diet  Goal: Patient will consume 50 percent of 2-3 meals/day and 100 percent of one Boost [facility] daily      Nutrition Monitoring and Evaluation   Food/Nutrient Related History Monitoring  Monitoring and Evaluation Plan: Intake / amount of food  Intake / Amount of food: Consumes at least 50% or more of meals/snacks/supplements       Goal Status: Some progress toward goal(s)  Time Spent/Follow-up Reminder:       Time Spent (min): 60 minutes  Last Date of Nutrition Visit: 05/27/25  Nutrition Follow-Up Needed?: Dietitian to reassess per policy  Follow up Comment: Diet/ONS SPCM      05/27/25 at 1:59 PM - BEATRICE GROVES RDN, LD

## 2025-05-27 NOTE — CARE PLAN
The patient's goals for the shift include patient will have increase in her diet    The clinical goals for the shift include patient will remain safe during the shift    Over the shift, the patient did not make progress toward the following goals. Barriers to progression include patient with poor po intake. Recommendations to address these barriers include encourage po intake  Problem: Safety - Medical Restraint  Goal: Remains free of injury from restraints (Restraint for Interference with Medical Device)  Outcome: Progressing     Problem: Pain - Adult  Goal: Verbalizes/displays adequate comfort level or baseline comfort level  Outcome: Progressing     Problem: Chronic Conditions and Co-morbidities  Goal: Patient's chronic conditions and co-morbidity symptoms are monitored and maintained or improved  Outcome: Progressing   .

## 2025-05-30 DIAGNOSIS — N17.9 AKI (ACUTE KIDNEY INJURY): ICD-10-CM

## 2025-06-02 NOTE — DOCUMENTATION CLARIFICATION NOTE
"    PATIENT:               SAV CH  ACCT #:                  [id number]  MRN:                       [id number]  :                       3/29/1926  ADMIT DATE:       5/15/2025 6:01 PM  DISCH DATE:        2025 6:48 PM  RESPONDING PROVIDER #:        [id number]          PROVIDER RESPONSE TEXT:    I agree with dietician diagnosis of Severe Malnutrition on 2025 and 2025    CDI QUERY TEXT:    Clarification    Instruction:    Based on your assessment of the patient and the clinical information, please provide the requested documentation by clicking on the appropriate radio button and enter any additional information if prompted.    Question: Please further clarify this patient nutritional status as    When answering this query, please exercise your independent professional judgment. The fact that a question is being asked, does not imply that any particular answer is desired or expected.    The patient's clinical indicators include:  Clinical Information: 89 YOF who presented to [facility] from [facility] with complaints by family of confusion and altered mental status.    Clinical Indicators: BMI 20.84    2025 Nutrition Consult note: \"Nutrition Diagnosis  Malnutrition Diagnosis  Patient has Malnutrition Diagnosis: Yes  Diagnosis Status: New  Malnutrition Diagnosis: Severe malnutrition related to chronic disease or condition  Related to: poor intake in setting of acute illness, chronic disease (Alzheimers), as well as advanced age  As Evidenced by: severe weight loss greater than 10% in the last 6 months and energy intake less than 50% of esimated needs for greater than 5 days.\"    Treatment: Nutrition consult, Boost VHC BID (530 calories/22grams protein each)    Risk Factors: severe weight loss greater than 10% in the last 6 months and energy intake less than 50% of estimated needs for greater than 5 days  Options provided:  -- I agree with dietician diagnosis of Severe Malnutrition on " 05/19/2025 and 05/27/2025  -- Other - I will add my own diagnosis  -- Refer to Clinical Documentation Reviewer    Query created by: Lana Lo on 6/2/2025 12:47 PM      Electronically signed by:  CORRIE YORK MD 6/2/2025 3:45 PM

## 2025-06-18 ENCOUNTER — NURSING HOME VISIT (OUTPATIENT)
Dept: POST ACUTE CARE | Facility: EXTERNAL LOCATION | Age: OVER 89
End: 2025-06-18
Payer: MEDICARE

## 2025-06-18 DIAGNOSIS — I10 PRIMARY HYPERTENSION: ICD-10-CM

## 2025-06-18 DIAGNOSIS — N18.30 STAGE 3 CHRONIC KIDNEY DISEASE, UNSPECIFIED WHETHER STAGE 3A OR 3B CKD (MULTI): ICD-10-CM

## 2025-06-18 DIAGNOSIS — R53.1 WEAKNESS: ICD-10-CM

## 2025-06-18 DIAGNOSIS — I48.20 CHRONIC ATRIAL FIBRILLATION (MULTI): Primary | ICD-10-CM

## 2025-06-18 DIAGNOSIS — I50.9 CONGESTIVE HEART FAILURE, UNSPECIFIED HF CHRONICITY, UNSPECIFIED HEART FAILURE TYPE: ICD-10-CM

## 2025-06-18 PROCEDURE — 99308 SBSQ NF CARE LOW MDM 20: CPT | Performed by: INTERNAL MEDICINE

## 2025-06-18 NOTE — LETTER
Patient: Susan Patiño  : 3/29/1926    Encounter Date: 2025    Subjective- resident seen for follow up. She is sitting in her room and feels tired today. Appetite is fair. No falls.  Ros-  Gen- weakness+  Chest-no pain or palpitations  Resp- chronic SOB +  Abdomen-no constipation No nausea/vomiting or diarrhea  Muskuloskeletal-no pain  Psych-no confusion  Vital signs-/90 T 97.58 P 83 Wt 122.2 lbs  General-alert, no acute distress  Neck-supple, non tender  Chest-clearbreath sounds bilaterally  Cardiovascular-S1S2 regular  Abdomen-bowel sounds present, soft, nontender  Extremities- trace edema  Psych-normal mood to affect  Assessment and plan-  Chronic COPD-stable  CHF- stable  c/w lasix  HTN- stable  c/w lisinopril  A fib- c/w metoprolol, apixaban  HLD-c/w lipitor  OA- c/w tylenol prn  CKD- monitor  Weakness-fall precautions  Labs ordered    Electronically Signed By: Rajni Acosta MD   25  9:16 AM

## 2025-06-19 NOTE — PROGRESS NOTES
Subjective- resident seen for follow up. She is sitting in her room and feels tired today. Appetite is fair. No falls.  Ros-  Gen- weakness+  Chest-no pain or palpitations  Resp- chronic SOB +  Abdomen-no constipation No nausea/vomiting or diarrhea  Muskuloskeletal-no pain  Psych-no confusion  Vital signs-/90 T 97.58 P 83 Wt 122.2 lbs  General-alert, no acute distress  Neck-supple, non tender  Chest-clearbreath sounds bilaterally  Cardiovascular-S1S2 regular  Abdomen-bowel sounds present, soft, nontender  Extremities- trace edema  Psych-normal mood to affect  Assessment and plan-  Chronic COPD-stable  CHF- stable  c/w lasix  HTN- stable  c/w lisinopril  A fib- c/w metoprolol, apixaban  HLD-c/w lipitor  OA- c/w tylenol prn  CKD- monitor  Weakness-fall precautions  Labs ordered

## 2025-06-24 ENCOUNTER — OFFICE VISIT (OUTPATIENT)
Dept: UROLOGY | Facility: HOSPITAL | Age: OVER 89
End: 2025-06-24
Payer: MEDICARE

## 2025-06-24 DIAGNOSIS — R33.8 ACUTE URINARY RETENTION: Primary | ICD-10-CM

## 2025-06-24 PROCEDURE — 1111F DSCHRG MED/CURRENT MED MERGE: CPT | Performed by: NURSE PRACTITIONER

## 2025-06-24 PROCEDURE — G2211 COMPLEX E/M VISIT ADD ON: HCPCS | Performed by: NURSE PRACTITIONER

## 2025-06-24 PROCEDURE — 51798 US URINE CAPACITY MEASURE: CPT | Performed by: NURSE PRACTITIONER

## 2025-06-24 PROCEDURE — 1160F RVW MEDS BY RX/DR IN RCRD: CPT | Performed by: NURSE PRACTITIONER

## 2025-06-24 PROCEDURE — 1159F MED LIST DOCD IN RCRD: CPT | Performed by: NURSE PRACTITIONER

## 2025-06-24 PROCEDURE — 99214 OFFICE O/P EST MOD 30 MIN: CPT | Mod: 25 | Performed by: NURSE PRACTITIONER

## 2025-06-24 PROCEDURE — 1036F TOBACCO NON-USER: CPT | Performed by: NURSE PRACTITIONER

## 2025-06-24 PROCEDURE — 99214 OFFICE O/P EST MOD 30 MIN: CPT | Performed by: NURSE PRACTITIONER

## 2025-06-24 NOTE — PATIENT INSTRUCTIONS
- TOV today pending. Patient to go home and allow more time to urinate  - PVR 26 ml  - Patient encouraged to drink plenty of fluids to maintain hydration and clear urine output ( 60-80 oz of fluids)  - Discussed that they may have some irritative voiding symptoms over the next few days: burning, frequency, urgency  - Patient to have catheter replaced by nursing home staff if unable to void in 8-12 hr or unable to void with urge  - Follow up as needed

## 2025-06-24 NOTE — PROGRESS NOTES
[facility] [place]  Outpatient Clinic Note    Patient Name:  Susan Patiño  MRN:  [id number]  :  3/29/1926    Referring Provider: Sandra Ward, *  Date of Service: 2025   Visit type: New patient visit     problem list/Chief complaint:  Urinary retention  UTI- >=100,000 CFU/mL Klebsiella pneumoniae/variicola Abnormal  urine culture 5/15/25  Bilateral renal cysts      HISTORY OF PRESENT ILLNESS:  Susan Patiño is a 89 y.o. female with past medical history of pA-fib, alzheimer's disease, CHF, CKD 3B, HTN, MDD, CAD, glaucoma, pulmonary HTN, osteoarthritis, bilateral carotid artery stenosis, PVD, HLD, chronic AAA and dissection, recurrent syncope, who presents for initial Urology visit. I performed a detailed review of the medical chart records lab testing and imaging. Patient was admitted from ER on 5/15/25 for confusion and altered mental status, found to have acute cystitis and started on antibiotics. Crawford catheter placed for urinary retention, patient failed TOV, discharged with outpatient Urology referral for TOV.  Patient is accompanied by her daughter and grand daughter. She is currently residing at a nursing home. TOV pending, patient to go home and allow more time to urinate.    I personally reviewed Renal US dated 5/15/25.     - Impression -  1. The previously described enhancing nodule in the right kidney from  prior CT chest abdomen and pelvis dated 05/15/2025 is not  definitively visualized on ultrasound examination, likely due to  limitations above. Consider MRI for further characterization in case  of persistent concern.  2. The bilateral kidneys are echogenic, which can be seen in the  setting of medical renal disease. Limited evaluation of the left  kidney as above. Multiple cysts throughout the bilateral kidneys. No  evidence of hydronephrosis.    PAST MEDICAL HISTORY:  Medical History[1]    PAST SURGICAL HISTORY:  Surgical  History[2]    ALLERGIES:  Allergies[3]    MEDICATIONS:  Current Outpatient Medications   Medication Instructions    acetaminophen (TYLENOL) 1,000 mg, oral, 3 times daily, For pain    acetaminophen (TYLENOL) 650 mg, oral, Every 4 hours PRN    apixaban (ELIQUIS) 2.5 mg, oral, 2 times daily    atorvastatin (LIPITOR) 40 mg, oral, Nightly    brimonidine (AlphaGAN) 0.2 % ophthalmic solution 1 drop, Left Eye, 2 times daily    cholecalciferol (VITAMIN D-3) 50 mcg, oral, Daily    diclofenac sodium (VOLTAREN) 2 g, Topical, 2 times daily, To affected joint(s)    dorzolamide-timoloL (Cosopt) 22.3-6.8 mg/mL ophthalmic solution 1 drop, 2 times daily    furosemide (LASIX) 20 mg, oral, Daily PRN    ipratropium-albuteroL (Duo-Neb) 0.5-2.5 mg/3 mL nebulizer solution 3 mL, nebulization, Every 8 hours PRN    latanoprost (Xalatan) 0.005 % ophthalmic solution 1 drop, Nightly    loratadine (CLARITIN) 10 mg, oral, Every 48 hours    metoprolol succinate XL (TOPROL-XL) 100 mg, Daily    mirtazapine (REMERON) 7.5 mg, Nightly    ondansetron (ZOFRAN) 4 mg, oral, Every 8 hours PRN    sertraline (ZOLOFT) 25 mg, Daily    sodium chloride (Ocean) 0.65 % nasal spray 1 spray, Each Nostril, 2 times daily, For dry nasal    vit C/E/zinc/lutein/zeaxanthin (OCUVITE EYE HEALTH ORAL) 1 tablet, oral, Daily, Ocuvite Eye + Multi Oral Tablet        SOCIAL HISTORY:  Social History[4]     FAMILY HISTORY:  Family History[5]     REVIEW OF SYSTEMS:  10-pt ROS reviewed and negative except as mentioned above.    Vital signs:  There were no vitals taken for this visit.    PHYSICAL EXAMINATION:  General: Appears comfortable and in no apparent distress.  Head: Normocephalic, atraumatic  Eyes: Non-injected conjunctiva, sclera clear, no proptosis  Lungs: Breathing is easy, non-labored. Speaking in clear and complete sentences. Normal diaphragmatic movement.  Cardiovascular: no peripheral edema, cyanosis or pallor.   Abdomen: soft, non-distended, non-tender  : Bladder: non  tender, not distended  MSK: Using wheelchair  Skin: No visible rashes or lesions  Neurologic: Alert, oriented to person, place, and time  Psychiatric: mood and affect appropriate      IMAGING DATA:   US renal complete  Narrative: Interpreted By:  Reed Hernandez and Hofer Lindsay   STUDY:  US RENAL COMPLETE;  5/19/2025 1:53 pm      INDICATION:  Signs/Symptoms:progressive CODY.          COMPARISON:  CT chest abdomen and pelvis 05/15/2025.      ACCESSION NUMBER(S):  [id number]      ORDERING CLINICIAN:  CORRIE YORK      TECHNIQUE:  Multiple images of the kidneys were obtained.      FINDINGS:  Limitations due to bowel gas and body habitus.      RIGHT KIDNEY:  The right kidney measures 10.92 cm in length. The renal cortex is  echogenic. No hydronephrosis is present. No evidence of  nephrolithiasis. Multiple anechoic structures with posterior acoustic  enhancement and no internal vascularity, incompletely characterized  on ultrasound, but most consistent with simple renal cysts measuring  up to 4.6 x 3.1 x 3.3 cm. The previously seen enhancing lesion within  the right kidney is not definitively visualized on ultrasound, likely  due to limited exam.      LEFT KIDNEY:  The left kidney is poorly visualized due to bowel gas and body  habitus. The visualized left kidney measures 9.83 cm in length. The  renal cortex is echogenic. No hydronephrosis is present. No evidence  of nephrolithiasis. Hypoechoic structure within the left kidney with  posterior acoustic enhancement, which is incompletely evaluated on  ultrasound, but most consistent with a simple renal cyst.      BLADDER:  The urinary bladder is unremarkable in appearance.      Impression: 1. The previously described enhancing nodule in the right kidney from  prior CT chest abdomen and pelvis dated 05/15/2025 is not  definitively visualized on ultrasound examination, likely due to  limitations above. Consider MRI for further characterization in  case  of persistent concern.  2. The bilateral kidneys are echogenic, which can be seen in the  setting of medical renal disease. Limited evaluation of the left  kidney as above. Multiple cysts throughout the bilateral kidneys. No  evidence of hydronephrosis.      I personally reviewed the images/study and resident's interpretation  and I agree with the findings as stated by Scarlett Gallego MD (resident  radiologist). This study was analyzed and interpreted at [facility], [place], [place].      MACRO:  None          Signed by: Reed Miramontes 5/19/2025 10:06 PM  Dictation workstation:   [id number]      LABORATORY DATA:    Lab Results   Component Value Date    WBC 14.3 (H) 05/27/2025    HGB 11.2 (L) 05/27/2025    HCT 36.9 05/27/2025    MCV 94 05/27/2025     (L) 05/27/2025     Lab Results   Component Value Date    GLUCOSE 99 05/27/2025    CALCIUM 8.9 05/27/2025     05/27/2025    K 3.7 05/27/2025    CO2 22 05/27/2025     (H) 05/27/2025    BUN 45 (H) 05/27/2025    CREATININE 1.88 (H) 05/27/2025         ASSESSMENT:  Susan Patiño is a 89 y.o. female with bilateral renal cysts, cystitis, urinary retention, who presents for initial Urology visit after hospital discharge for TOV.    PLAN:  - TOV today pending. Patient to go home and allow more time to urinate  - PVR 26 ml  - Patient encouraged to drink plenty of fluids to maintain hydration and clear urine output ( 60-80 oz of fluids)  - Discussed that they may have some irritative voiding symptoms over the next few days: burning, frequency, urgency  - Patient to have catheter replaced by nursing home staff if unable to void in 8-12 hr or unable to void with urge  - Follow up as needed    All questions and concerns were addressed. Patient verbalizes understanding and has no other questions at this time.     E&M visit today is associated with current or anticipated ongoing medical care services related to  a patient's single, serious condition or a complex condition.    DELFINO Diaz-CNP  [facility] [place]  6/24/2025 1:03 PM         [1] History reviewed. No pertinent past medical history.  [2] History reviewed. No pertinent surgical history.  [3]   Allergies  Allergen Reactions    Celecoxib Unknown   [4]   Social History  Tobacco Use    Smoking status: Never     Passive exposure: Past    Smokeless tobacco: Never   Vaping Use    Vaping status: Never Used   Substance Use Topics    Alcohol use: Not Currently    Drug use: Never   [5] No family history on file.

## 2025-06-24 NOTE — PROGRESS NOTES
Pt is here today for a trial of void, name and date of birth was verified. Procedure was explained to pt, and understood. Pt [id number] cc of sterile saline infused through urinary catheter without difficulty. Urinary catheter was removed and patient voided 0 cc. A PVR scan was done and showed 26mL of urine left in the bladder.   It was explained to pt that if unable to urinate to go to the emergency room. Pt was told and understood to drink plenty of fluids to keep the bladder stimulated. If there were any questions or concerns pt understood --- he/she is to call the office.